# Patient Record
Sex: FEMALE | Race: WHITE | Employment: OTHER | URBAN - METROPOLITAN AREA
[De-identification: names, ages, dates, MRNs, and addresses within clinical notes are randomized per-mention and may not be internally consistent; named-entity substitution may affect disease eponyms.]

---

## 2021-09-24 ENCOUNTER — TRANSCRIBE ORDER (OUTPATIENT)
Dept: REGISTRATION | Age: 71
End: 2021-09-24

## 2021-09-24 ENCOUNTER — HOSPITAL ENCOUNTER (OUTPATIENT)
Dept: LAB | Age: 71
Discharge: HOME | End: 2021-09-24
Payer: MEDICARE

## 2021-09-24 DIAGNOSIS — K75.81 NONALCOHOLIC STEATOHEPATITIS: ICD-10-CM

## 2021-09-24 DIAGNOSIS — K75.81 NONALCOHOLIC STEATOHEPATITIS: Primary | ICD-10-CM

## 2021-09-24 LAB
ALBUMIN SERPL-MCNC: 2.4 G/DL (ref 3.2–4.6)
ALBUMIN/GLOB SERPL: 0.4 {RATIO} (ref 1.2–3.5)
ALP SERPL-CCNC: 202 U/L (ref 50–136)
ALT SERPL-CCNC: 27 U/L (ref 12–65)
ANION GAP SERPL CALC-SCNC: 4 MMOL/L (ref 7–16)
APTT PPP: 30.9 SEC (ref 24.1–35.1)
AST SERPL-CCNC: 34 U/L (ref 15–37)
BASOPHILS # BLD: 0 K/UL (ref 0–0.2)
BASOPHILS NFR BLD: 1 % (ref 0–2)
BILIRUB SERPL-MCNC: 0.7 MG/DL (ref 0.2–1.1)
BUN SERPL-MCNC: 23 MG/DL (ref 8–23)
CALCIUM SERPL-MCNC: 9.4 MG/DL (ref 8.3–10.4)
CHLORIDE SERPL-SCNC: 100 MMOL/L (ref 98–107)
CO2 SERPL-SCNC: 33 MMOL/L (ref 21–32)
CREAT SERPL-MCNC: 0.91 MG/DL (ref 0.6–1)
DIFFERENTIAL METHOD BLD: ABNORMAL
EOSINOPHIL # BLD: 0.1 K/UL (ref 0–0.8)
EOSINOPHIL NFR BLD: 1 % (ref 0.5–7.8)
ERYTHROCYTE [DISTWIDTH] IN BLOOD BY AUTOMATED COUNT: 15.5 % (ref 11.9–14.6)
GLOBULIN SER CALC-MCNC: 6 G/DL (ref 2.3–3.5)
GLUCOSE SERPL-MCNC: 236 MG/DL (ref 65–100)
HCT VFR BLD AUTO: 38.2 % (ref 35.8–46.3)
HGB BLD-MCNC: 12 G/DL (ref 11.7–15.4)
IMM GRANULOCYTES # BLD AUTO: 0.1 K/UL (ref 0–0.5)
IMM GRANULOCYTES NFR BLD AUTO: 2 % (ref 0–5)
INR PPP: 1.2
LYMPHOCYTES # BLD: 0.6 K/UL (ref 0.5–4.6)
LYMPHOCYTES NFR BLD: 14 % (ref 13–44)
MCH RBC QN AUTO: 27.3 PG (ref 26.1–32.9)
MCHC RBC AUTO-ENTMCNC: 31.4 G/DL (ref 31.4–35)
MCV RBC AUTO: 87 FL (ref 79.6–97.8)
MONOCYTES # BLD: 0.7 K/UL (ref 0.1–1.3)
MONOCYTES NFR BLD: 16 % (ref 4–12)
NEUTS SEG # BLD: 3 K/UL (ref 1.7–8.2)
NEUTS SEG NFR BLD: 67 % (ref 43–78)
NRBC # BLD: 0 K/UL (ref 0–0.2)
PLATELET # BLD AUTO: 234 K/UL (ref 150–450)
PMV BLD AUTO: 10.7 FL (ref 9.4–12.3)
POTASSIUM SERPL-SCNC: 3.7 MMOL/L (ref 3.5–5.1)
PROT SERPL-MCNC: 8.4 G/DL (ref 6.3–8.2)
PROTHROMBIN TIME: 15.3 SEC (ref 12.6–14.5)
RBC # BLD AUTO: 4.39 M/UL (ref 4.05–5.2)
SODIUM SERPL-SCNC: 137 MMOL/L (ref 136–145)
WBC # BLD AUTO: 4.5 K/UL (ref 4.3–11.1)

## 2021-09-24 PROCEDURE — 85025 COMPLETE CBC W/AUTO DIFF WBC: CPT

## 2021-09-24 PROCEDURE — 36415 COLL VENOUS BLD VENIPUNCTURE: CPT

## 2021-09-24 PROCEDURE — 85730 THROMBOPLASTIN TIME PARTIAL: CPT

## 2021-09-24 PROCEDURE — 85610 PROTHROMBIN TIME: CPT

## 2021-09-24 PROCEDURE — 80053 COMPREHEN METABOLIC PANEL: CPT

## 2021-10-04 ENCOUNTER — HOSPITAL ENCOUNTER (OUTPATIENT)
Dept: INTERVENTIONAL RADIOLOGY/VASCULAR | Age: 71
Discharge: HOME | End: 2021-10-04
Attending: INTERNAL MEDICINE
Payer: MEDICARE

## 2021-10-04 VITALS
SYSTOLIC BLOOD PRESSURE: 124 MMHG | WEIGHT: 153 LBS | TEMPERATURE: 97.2 F | BODY MASS INDEX: 26.12 KG/M2 | RESPIRATION RATE: 16 BRPM | OXYGEN SATURATION: 98 % | HEART RATE: 69 BPM | DIASTOLIC BLOOD PRESSURE: 62 MMHG | HEIGHT: 64 IN

## 2021-10-04 DIAGNOSIS — K75.81 NASH (NONALCOHOLIC STEATOHEPATITIS): ICD-10-CM

## 2021-10-04 PROCEDURE — 77030010507 HC ADH SKN DERMBND J&J -B

## 2021-10-04 PROCEDURE — 77030014146 HC TY THORCENT/PARACENT BD -B

## 2021-10-04 PROCEDURE — 49083 ABD PARACENTESIS W/IMAGING: CPT

## 2021-10-04 RX ORDER — GUAIFENESIN 100 MG/5ML
81 LIQUID (ML) ORAL DAILY
COMMUNITY

## 2021-10-04 NOTE — DISCHARGE INSTRUCTIONS
[address] [facility] [place]  Department of Interventional Radiology  [facility]  [phone] Office  [phone] Fax    PARACENTESIS DISCHARGE INSTRUCTIONS    General Information:  During this procedure, the doctor will insert a needle into the abdomen to drain fluid. After the procedure, you will be able to take a deep breath much easier. The site of the puncture may ooze the first day. This will decrease and eventually stop. Paracentesis (draining fluid from the abdomen) sometimes makes patients hypotensive (low blood pressure). Your doctor may order for you to receive fluids or albumin (a volume booster) during the procedure through an IV site. Home Care Instructions:  Keep the puncture site clean and dry. No tub baths or swimming until puncture site heals. Showering is acceptable. Resume your normal diet, and resume your normal activity slowly and as you tolerate. If you are short of breath, rest. If shortness of breath does not ease, please call your ordering doctor. Fluid can re-accumulate in the chest and/or in the abdomen. If this should occur, your doctor needs to know as you may need to have the procedure done again. Call If:     You should call your Physician and/or the Radiology Nurse if you notice any signs of infection, like pus draining, or if it is swollen or reddened. Also call if you have a fever, or if you are bleeding from the puncture site more than a small amount on the dressing. Call if the puncture site keeps draining fluid. Some oozing is to be expected, but should slow and then stop. Call if you feel like you have pressure in your abdomen. SEEK IMMEDIATE CARE OR CALL 911 IF YOU SUDDENLY HAVE TROUBLE BREATHING, OR IF YOUR LIPS TURN BLUE, OR IF YOU NOTICE BLOOD IN YOUR SPUTUM. Follow-Up Instructions: Please see your ordering doctor as he/she has requested. To Reach Us:   If you have any questions about your procedure, please call the Interventional Radiology department at [phone]. After business hours (5pm) and weekends, call the answering service at [phone] and ask for the Radiologist on call to be paged. Si tiene Preguntas acerca del procedimiento, por favor llame al departamento de Radiología Intervencional al [phone]. Después de horas de oficina (5 pm) y los fines de [place], llamar al Adamaris Marilu Bravo al [phone] y pregunte por el Radiologo de [facility]. Interventional Radiology General Nurse Discharge    After general anesthesia or intravenous sedation, for 24 hours or while taking prescription Narcotics:  · Limit your activities  · Do not drive and operate hazardous machinery  · Do not make important personal or business decisions  · Do  not drink alcoholic beverages  · If you have not urinated within 8 hours after discharge, please contact your surgeon on call. * Please give a list of your current medications to your Primary Care Provider. * Please update this list whenever your medications are discontinued, doses are     changed, or new medications (including over-the-counter products) are added. * Please carry medication information at all times in case of emergency situations. These are general instructions for a healthy lifestyle:    No smoking/ No tobacco products/ Avoid exposure to second hand smoke  Surgeon General's Warning:  Quitting smoking now greatly reduces serious risk to your health. Obesity, smoking, and sedentary lifestyle greatly increases your risk for illness  A healthy diet, regular physical exercise & weight monitoring are important for maintaining a healthy lifestyle    You may be retaining fluid if you have a history of heart failure or if you experience any of the following symptoms:  Weight gain of 3 pounds or more overnight or 5 pounds in a week, increased swelling in our hands or feet or shortness of breath while lying flat in bed.   Please call your doctor as soon as you notice any of these symptoms; do not wait until your next office visit. Recognize signs and symptoms of STROKE:  F-face looks uneven    A-arms unable to move or move unevenly    S-speech slurred or non-existent    T-time-call 911 as soon as signs and symptoms begin-DO NOT go       Back to bed or wait to see if you get better-TIME IS BRAIN.

## 2021-10-04 NOTE — PROGRESS NOTES
Interventional Radiology Post Paracentesis/Thoracentesis Note    10/4/2021    Procedure(s): Ultrasound guided Therapeutic Paracentesis Performed with 8 [place]ese catheter total volume 1650 ml. Preliminary Findings: medium yellow. Complications: None    Plan:  Observation, check labs if drawn.           Chest X-Ray:  no

## 2021-10-28 ENCOUNTER — HOSPITAL ENCOUNTER (OUTPATIENT)
Dept: INTERVENTIONAL RADIOLOGY/VASCULAR | Age: 71
Discharge: HOME | End: 2021-10-28
Attending: INTERNAL MEDICINE
Payer: MEDICARE

## 2021-10-28 VITALS
TEMPERATURE: 98 F | SYSTOLIC BLOOD PRESSURE: 119 MMHG | OXYGEN SATURATION: 97 % | WEIGHT: 153 LBS | HEART RATE: 62 BPM | BODY MASS INDEX: 26.12 KG/M2 | DIASTOLIC BLOOD PRESSURE: 59 MMHG | RESPIRATION RATE: 16 BRPM | HEIGHT: 64 IN

## 2021-10-28 DIAGNOSIS — I85.10 SECONDARY ESOPHAGEAL VARICES WITHOUT BLEEDING (HCC): ICD-10-CM

## 2021-10-28 PROCEDURE — 49083 ABD PARACENTESIS W/IMAGING: CPT

## 2021-10-28 PROCEDURE — 77030010507 HC ADH SKN DERMBND J&J -B

## 2021-10-28 PROCEDURE — 77030014146 HC TY THORCENT/PARACENT BD -B

## 2021-10-28 NOTE — DISCHARGE INSTRUCTIONS
[address]  Department of Interventional Radiology  [facility]  [phone] Office  [phone] Fax    PARACENTESIS DISCHARGE INSTRUCTIONS    General Information:  During this procedure, the doctor will insert a needle into the abdomen to drain fluid. After the procedure, you will be able to take a deep breath much easier. The site of the puncture may ooze the first day. This will decrease and eventually stop. Paracentesis (draining fluid from the abdomen) sometimes makes patients hypotensive (low blood pressure). Your doctor may order for you to receive fluids or albumin (a volume booster) during the procedure through an IV site. Home Care Instructions:  Keep the puncture site clean and dry. No tub baths or swimming until puncture site heals. Showering is acceptable. Resume your normal diet, and resume your normal activity slowly and as you tolerate. If you are short of breath, rest. If shortness of breath does not ease, please call your ordering doctor. Fluid can re-accumulate in the chest and/or in the abdomen. If this should occur, your doctor needs to know as you may need to have the procedure done again. Call If:     You should call your Physician and/or the Radiology Nurse if you notice any signs of infection, like pus draining, or if it is swollen or reddened. Also call if you have a fever, or if you are bleeding from the puncture site more than a small amount on the dressing. Call if the puncture site keeps draining fluid. Some oozing is to be expected, but should slow and then stop. Call if you feel like you have pressure in your abdomen. SEEK IMMEDIATE CARE OR CALL 911 IF YOU SUDDENLY HAVE TROUBLE BREATHING, OR IF YOUR LIPS TURN BLUE, OR IF YOU NOTICE BLOOD IN YOUR SPUTUM. Follow-Up Instructions: Please see your ordering doctor as he/she has requested. To Reach Us:     If you have any questions about your procedure, please call the Interventional Radiology department at [phone]. After business hours (5pm) and weekends, call the answering service at [phone] and ask for the Radiologist on call to be paged. Si tiene Preguntas acerca del procedimiento, por favor llame al departamento de Radiología Intervencional al [phone]. Después de horas de oficina (5 pm) y los fines de [place], llamar al Andrea Bravo al [phone] y pregunte por el Radiologo de [facility]. Interventional Radiology General Nurse Discharge    After general anesthesia or intravenous sedation, for 24 hours or while taking prescription Narcotics:  · Limit your activities  · Do not drive and operate hazardous machinery  · Do not make important personal or business decisions  · Do  not drink alcoholic beverages  · If you have not urinated within 8 hours after discharge, please contact your surgeon on call. * Please give a list of your current medications to your Primary Care Provider. * Please update this list whenever your medications are discontinued, doses are     changed, or new medications (including over-the-counter products) are added. * Please carry medication information at all times in case of emergency situations. These are general instructions for a healthy lifestyle:    No smoking/ No tobacco products/ Avoid exposure to second hand smoke  Surgeon General's Warning:  Quitting smoking now greatly reduces serious risk to your health. Obesity, smoking, and sedentary lifestyle greatly increases your risk for illness  A healthy diet, regular physical exercise & weight monitoring are important for maintaining a healthy lifestyle    You may be retaining fluid if you have a history of heart failure or if you experience any of the following symptoms:  Weight gain of 3 pounds or more overnight or 5 pounds in a week, increased swelling in our hands or feet or shortness of breath while lying flat in bed.   Please call your doctor as soon as you notice any of these symptoms; do not wait until your next office visit. Recognize signs and symptoms of STROKE:  F-face looks uneven    A-arms unable to move or move unevenly    S-speech slurred or non-existent    T-time-call 911 as soon as signs and symptoms begin-DO NOT go       Back to bed or wait to see if you get better-TIME IS BRAIN.       Date: 10/28/2021  Discharging Nurse: Prashanth Lopez RN

## 2021-11-22 ENCOUNTER — HOSPITAL ENCOUNTER (OUTPATIENT)
Dept: INTERVENTIONAL RADIOLOGY/VASCULAR | Age: 71
Discharge: HOME | End: 2021-11-22
Attending: INTERNAL MEDICINE
Payer: MEDICARE

## 2021-11-22 VITALS
RESPIRATION RATE: 16 BRPM | OXYGEN SATURATION: 97 % | DIASTOLIC BLOOD PRESSURE: 61 MMHG | HEART RATE: 65 BPM | TEMPERATURE: 97.7 F | SYSTOLIC BLOOD PRESSURE: 115 MMHG

## 2021-11-22 DIAGNOSIS — K75.81 NASH (NONALCOHOLIC STEATOHEPATITIS): ICD-10-CM

## 2021-11-22 PROCEDURE — 77030010507 HC ADH SKN DERMBND J&J -B

## 2021-11-22 PROCEDURE — 77030014146 HC TY THORCENT/PARACENT BD -B

## 2021-11-22 PROCEDURE — 49083 ABD PARACENTESIS W/IMAGING: CPT

## 2021-11-22 NOTE — DISCHARGE INSTRUCTIONS
[address]  Department of Interventional Radiology  [facility]  [phone] Office  [phone] Fax    PARACENTESIS DISCHARGE INSTRUCTIONS    General Information:  During this procedure, the doctor will insert a needle into the abdomen to drain fluid. After the procedure, you will be able to take a deep breath much easier. The site of the puncture may ooze the first day. This will decrease and eventually stop. Paracentesis (draining fluid from the abdomen) sometimes makes patients hypotensive (low blood pressure). Your doctor may order for you to receive fluids or albumin (a volume booster) during the procedure through an IV site. Home Care Instructions:  Keep the puncture site clean and dry. No tub baths or swimming until puncture site heals. Showering is acceptable. Resume your normal diet, and resume your normal activity slowly and as you tolerate. If you are short of breath, rest. If shortness of breath does not ease, please call your ordering doctor. Fluid can re-accumulate in the chest and/or in the abdomen. If this should occur, your doctor needs to know as you may need to have the procedure done again. Call If:     You should call your Physician and/or the Radiology Nurse if you notice any signs of infection, like pus draining, or if it is swollen or reddened. Also call if you have a fever, or if you are bleeding from the puncture site more than a small amount on the dressing. Call if the puncture site keeps draining fluid. Some oozing is to be expected, but should slow and then stop. Call if you feel like you have pressure in your abdomen. SEEK IMMEDIATE CARE OR CALL 911 IF YOU SUDDENLY HAVE TROUBLE BREATHING, OR IF YOUR LIPS TURN BLUE, OR IF YOU NOTICE BLOOD IN YOUR SPUTUM. Follow-Up Instructions: Please see your ordering doctor as he/she has requested. To Reach Us:     If you have any questions about your procedure, please call the Interventional Radiology department at [phone]. After business hours (5pm) and weekends, call the answering service at [phone] and ask for the Radiologist on call to be paged. Si tiene Preguntas acerca del procedimiento, por favor llame al departamento de Radiología Intervencional al [phone]. Después de horas de oficina (5 pm) y los fines de [place], llamar al Adamaris Marilu Zayaslotte al [phone] y pregunte por el Radiologo de [facility]. Interventional Radiology General Nurse Discharge    After general anesthesia or intravenous sedation, for 24 hours or while taking prescription Narcotics:  · Limit your activities  · Do not drive and operate hazardous machinery  · Do not make important personal or business decisions  · Do  not drink alcoholic beverages  · If you have not urinated within 8 hours after discharge, please contact your surgeon on call. * Please give a list of your current medications to your Primary Care Provider. * Please update this list whenever your medications are discontinued, doses are     changed, or new medications (including over-the-counter products) are added. * Please carry medication information at all times in case of emergency situations. These are general instructions for a healthy lifestyle:    No smoking/ No tobacco products/ Avoid exposure to second hand smoke  Surgeon General's Warning:  Quitting smoking now greatly reduces serious risk to your health. Obesity, smoking, and sedentary lifestyle greatly increases your risk for illness  A healthy diet, regular physical exercise & weight monitoring are important for maintaining a healthy lifestyle    You may be retaining fluid if you have a history of heart failure or if you experience any of the following symptoms:  Weight gain of 3 pounds or more overnight or 5 pounds in a week, increased swelling in our hands or feet or shortness of breath while lying flat in bed.   Please call your doctor as soon as you notice any of these symptoms; do not wait until your next office visit. Recognize signs and symptoms of STROKE:  F-face looks uneven    A-arms unable to move or move unevenly    S-speech slurred or non-existent    T-time-call 911 as soon as signs and symptoms begin-DO NOT go       Back to bed or wait to see if you get better-TIME IS BRAIN.       Date: 11/22/2021  Discharging Nurse: [place] RN

## 2021-12-08 PROBLEM — G43.701 CHRONIC MIGRAINE WITHOUT AURA WITH STATUS MIGRAINOSUS, NOT INTRACTABLE: Status: ACTIVE | Noted: 2017-06-22

## 2021-12-08 PROBLEM — K52.9 CHRONIC DIARRHEA: Status: ACTIVE | Noted: 2019-03-12

## 2021-12-08 PROBLEM — E87.6 HYPOKALEMIA: Status: ACTIVE | Noted: 2020-09-29

## 2021-12-08 PROBLEM — G89.29 CHRONIC ABDOMINAL PAIN: Status: ACTIVE | Noted: 2019-03-12

## 2021-12-08 PROBLEM — K75.81 NASH (NONALCOHOLIC STEATOHEPATITIS): Status: ACTIVE | Noted: 2021-04-26

## 2021-12-08 PROBLEM — M21.372 LEFT FOOT DROP: Status: ACTIVE | Noted: 2021-04-26

## 2021-12-08 PROBLEM — I25.10 ATHEROSCLEROSIS OF CORONARY ARTERY: Status: ACTIVE | Noted: 2020-03-07

## 2021-12-08 PROBLEM — R11.2 NAUSEA & VOMITING: Status: ACTIVE | Noted: 2021-04-26

## 2021-12-08 PROBLEM — E78.5 DYSLIPIDEMIA: Status: ACTIVE | Noted: 2020-06-30

## 2021-12-08 PROBLEM — N39.0 UTI (URINARY TRACT INFECTION): Status: ACTIVE | Noted: 2020-09-29

## 2021-12-08 PROBLEM — I85.00 ESOPHAGEAL VARICES DETERMINED BY ENDOSCOPY (HCC): Status: ACTIVE | Noted: 2021-12-08

## 2021-12-08 PROBLEM — R10.9 CHRONIC ABDOMINAL PAIN: Status: ACTIVE | Noted: 2019-03-12

## 2021-12-08 PROBLEM — M47.812 DJD (DEGENERATIVE JOINT DISEASE) OF CERVICAL SPINE: Status: ACTIVE | Noted: 2021-12-08

## 2021-12-08 PROBLEM — H53.9 VISUAL DISTURBANCE: Status: ACTIVE | Noted: 2021-04-25

## 2021-12-08 PROBLEM — Z95.5 PRESENCE OF DRUG-ELUTING STENT IN LEFT CIRCUMFLEX CORONARY ARTERY: Status: ACTIVE | Noted: 2020-01-31

## 2021-12-08 PROBLEM — K44.9 HIATAL HERNIA: Status: ACTIVE | Noted: 2021-12-08

## 2021-12-08 PROBLEM — R07.9 CHEST PAIN: Status: ACTIVE | Noted: 2020-01-31

## 2021-12-08 PROBLEM — D50.0 ANEMIA DUE TO BLOOD LOSS: Status: ACTIVE | Noted: 2021-12-08

## 2021-12-08 PROBLEM — E87.1 HYPONATREMIA: Status: ACTIVE | Noted: 2021-04-26

## 2021-12-08 PROBLEM — N39.3 URINE, INCONTINENCE, STRESS FEMALE: Status: ACTIVE | Noted: 2018-07-31

## 2021-12-10 ENCOUNTER — HOSPITAL ENCOUNTER (OUTPATIENT)
Dept: INTERVENTIONAL RADIOLOGY/VASCULAR | Age: 71
Discharge: HOME | End: 2021-12-10
Attending: INTERNAL MEDICINE
Payer: MEDICARE

## 2021-12-10 VITALS
OXYGEN SATURATION: 97 % | HEART RATE: 71 BPM | RESPIRATION RATE: 16 BRPM | TEMPERATURE: 98.1 F | DIASTOLIC BLOOD PRESSURE: 63 MMHG | SYSTOLIC BLOOD PRESSURE: 143 MMHG

## 2021-12-10 DIAGNOSIS — K75.81 NASH (NONALCOHOLIC STEATOHEPATITIS): ICD-10-CM

## 2021-12-10 PROCEDURE — 76705 ECHO EXAM OF ABDOMEN: CPT

## 2021-12-10 NOTE — DISCHARGE INSTRUCTIONS
[address]  Department of Interventional Radiology  [facility]  [phone] Office  [phone] Fax    PARACENTESIS DISCHARGE INSTRUCTIONS    General Information:  During this procedure, the doctor will insert a needle into the abdomen to drain fluid. After the procedure, you will be able to take a deep breath much easier. The site of the puncture may ooze the first day. This will decrease and eventually stop. Paracentesis (draining fluid from the abdomen) sometimes makes patients hypotensive (low blood pressure). Your doctor may order for you to receive fluids or albumin (a volume booster) during the procedure through an IV site. Home Care Instructions:  Keep the puncture site clean and dry. No tub baths or swimming until puncture site heals. Showering is acceptable. Resume your normal diet, and resume your normal activity slowly and as you tolerate. If you are short of breath, rest. If shortness of breath does not ease, please call your ordering doctor. Fluid can re-accumulate in the chest and/or in the abdomen. If this should occur, your doctor needs to know as you may need to have the procedure done again. Call If:     You should call your Physician and/or the Radiology Nurse if you notice any signs of infection, like pus draining, or if it is swollen or reddened. Also call if you have a fever, or if you are bleeding from the puncture site more than a small amount on the dressing. Call if the puncture site keeps draining fluid. Some oozing is to be expected, but should slow and then stop. Call if you feel like you have pressure in your abdomen. SEEK IMMEDIATE CARE OR CALL 911 IF YOU SUDDENLY HAVE TROUBLE BREATHING, OR IF YOUR LIPS TURN BLUE, OR IF YOU NOTICE BLOOD IN YOUR SPUTUM. If you have any questions about your procedure, please call the Interventional Radiology department at [phone].  After business hours (5pm) and weekends, call the answering service at [phone] and ask for the Radiologist on call to be paged. Si tiene Preguntas acerca del procedimiento, por favor llame al departamento de Radiología Intervencional al [phone]. Después de horas de oficina (5 pm) y los fines de [place], llamar al Ceci Dadamaksim Shelly al [phone] y pregunte por el Radiologo de [facility]. Follow-Up Instructions: Please see your ordering doctor as he/she has requested. To Reach Us: Interventional Radiology General Nurse Discharge    After general anesthesia or intravenous sedation, for 24 hours or while taking prescription Narcotics:  · Limit your activities  · Do not drive and operate hazardous machinery  · Do not make important personal or business decisions  · Do  not drink alcoholic beverages  · If you have not urinated within 8 hours after discharge, please contact your surgeon on call. * Please give a list of your current medications to your Primary Care Provider. * Please update this list whenever your medications are discontinued, doses are     changed, or new medications (including over-the-counter products) are added. * Please carry medication information at all times in case of emergency situations. These are general instructions for a healthy lifestyle:    No smoking/ No tobacco products/ Avoid exposure to second hand smoke  Surgeon General's Warning:  Quitting smoking now greatly reduces serious risk to your health. Obesity, smoking, and sedentary lifestyle greatly increases your risk for illness  A healthy diet, regular physical exercise & weight monitoring are important for maintaining a healthy lifestyle    You may be retaining fluid if you have a history of heart failure or if you experience any of the following symptoms:  Weight gain of 3 pounds or more overnight or 5 pounds in a week, increased swelling in our hands or feet or shortness of breath while lying flat in bed.   Please call your doctor as soon as you notice any of these symptoms; do not wait until your next office visit. Recognize signs and symptoms of STROKE:  F-face looks uneven    A-arms unable to move or move unevenly    S-speech slurred or non-existent    T-time-call 911 as soon as signs and symptoms begin-DO NOT go       Back to bed or wait to see if you get better-TIME IS BRAIN.

## 2021-12-10 NOTE — PROCEDURES
Department of Interventional Radiology  [phone]        Interventional Radiology Brief Procedure Note    Patient: Macey Carolina MRN: [id number]  SSN: [id number]    YOB: 1950  Age: 66 y.o. Sex: female      Date of Procedure: 12/10/2021    Pre-Procedure Diagnosis: CARABALLO. Cirrhosis. Ascites. Post-Procedure Diagnosis: SAME    Procedure(s): Abdominal US examination. Brief Description of Procedure: as above    Performed By: Daniel Parisi MD     Assistants: None    Anesthesia:None    Estimated Blood Loss: None    Specimens:  None    Implants:  None    Findings: Minimal ascites. Complications: None    Recommendations: Return in 1-2 weeks. Follow Up: as above.      Signed By: Daniel Parisi MD     December 10, 2021

## 2022-01-06 ENCOUNTER — HOME HEALTH ADMISSION (OUTPATIENT)
Dept: HOME HEALTH SERVICES | Facility: HOME HEALTH | Age: 72
End: 2022-01-06
Payer: MEDICARE

## 2022-01-20 ENCOUNTER — HOME CARE VISIT (OUTPATIENT)
Dept: SCHEDULING | Facility: HOME HEALTH | Age: 72
End: 2022-01-20
Payer: MEDICARE

## 2022-01-20 VITALS
BODY MASS INDEX: 26.26 KG/M2 | WEIGHT: 153 LBS | RESPIRATION RATE: 18 BRPM | OXYGEN SATURATION: 98 % | DIASTOLIC BLOOD PRESSURE: 76 MMHG | HEART RATE: 82 BPM | SYSTOLIC BLOOD PRESSURE: 138 MMHG | TEMPERATURE: 97.9 F

## 2022-01-20 PROCEDURE — G0299 HHS/HOSPICE OF RN EA 15 MIN: HCPCS

## 2022-01-20 PROCEDURE — 3331090002 HH PPS REVENUE DEBIT

## 2022-01-20 PROCEDURE — 400013 HH SOC

## 2022-01-20 PROCEDURE — 400018 HH-NO PAY CLAIM PROCEDURE

## 2022-01-20 PROCEDURE — 3331090001 HH PPS REVENUE CREDIT

## 2022-01-21 PROCEDURE — 3331090002 HH PPS REVENUE DEBIT

## 2022-01-21 PROCEDURE — 3331090001 HH PPS REVENUE CREDIT

## 2022-01-22 PROCEDURE — 3331090002 HH PPS REVENUE DEBIT

## 2022-01-22 PROCEDURE — 3331090001 HH PPS REVENUE CREDIT

## 2022-01-23 PROCEDURE — 3331090002 HH PPS REVENUE DEBIT

## 2022-01-23 PROCEDURE — 3331090001 HH PPS REVENUE CREDIT

## 2022-01-24 ENCOUNTER — HOME CARE VISIT (OUTPATIENT)
Dept: SCHEDULING | Facility: HOME HEALTH | Age: 72
End: 2022-01-24
Payer: MEDICARE

## 2022-01-24 VITALS
SYSTOLIC BLOOD PRESSURE: 120 MMHG | DIASTOLIC BLOOD PRESSURE: 68 MMHG | TEMPERATURE: 98.5 F | RESPIRATION RATE: 17 BRPM | HEART RATE: 77 BPM | OXYGEN SATURATION: 99 %

## 2022-01-24 PROCEDURE — G0299 HHS/HOSPICE OF RN EA 15 MIN: HCPCS

## 2022-01-24 PROCEDURE — 3331090001 HH PPS REVENUE CREDIT

## 2022-01-24 PROCEDURE — 3331090002 HH PPS REVENUE DEBIT

## 2022-01-25 PROCEDURE — 3331090002 HH PPS REVENUE DEBIT

## 2022-01-25 PROCEDURE — 3331090001 HH PPS REVENUE CREDIT

## 2022-01-26 PROCEDURE — 3331090002 HH PPS REVENUE DEBIT

## 2022-01-26 PROCEDURE — 3331090001 HH PPS REVENUE CREDIT

## 2022-01-27 PROCEDURE — 3331090002 HH PPS REVENUE DEBIT

## 2022-01-27 PROCEDURE — 3331090001 HH PPS REVENUE CREDIT

## 2022-01-28 ENCOUNTER — HOME CARE VISIT (OUTPATIENT)
Dept: SCHEDULING | Facility: HOME HEALTH | Age: 72
End: 2022-01-28
Payer: MEDICARE

## 2022-01-28 VITALS
OXYGEN SATURATION: 99 % | HEART RATE: 76 BPM | SYSTOLIC BLOOD PRESSURE: 116 MMHG | TEMPERATURE: 97.2 F | DIASTOLIC BLOOD PRESSURE: 70 MMHG | RESPIRATION RATE: 18 BRPM

## 2022-01-28 PROCEDURE — 3331090002 HH PPS REVENUE DEBIT

## 2022-01-28 PROCEDURE — 3331090001 HH PPS REVENUE CREDIT

## 2022-01-28 PROCEDURE — G0299 HHS/HOSPICE OF RN EA 15 MIN: HCPCS

## 2022-01-29 PROCEDURE — 3331090002 HH PPS REVENUE DEBIT

## 2022-01-29 PROCEDURE — 3331090001 HH PPS REVENUE CREDIT

## 2022-01-30 PROCEDURE — 3331090002 HH PPS REVENUE DEBIT

## 2022-01-30 PROCEDURE — 3331090001 HH PPS REVENUE CREDIT

## 2022-01-31 ENCOUNTER — HOSPITAL ENCOUNTER (OUTPATIENT)
Dept: GENERAL RADIOLOGY | Age: 72
Discharge: HOME | End: 2022-01-31
Attending: INTERNAL MEDICINE
Payer: MEDICARE

## 2022-01-31 ENCOUNTER — HOSPITAL ENCOUNTER (OUTPATIENT)
Dept: INTERVENTIONAL RADIOLOGY/VASCULAR | Age: 72
Discharge: HOME | End: 2022-01-31
Attending: INTERNAL MEDICINE
Payer: MEDICARE

## 2022-01-31 VITALS
BODY MASS INDEX: 28.85 KG/M2 | HEIGHT: 64 IN | RESPIRATION RATE: 18 BRPM | TEMPERATURE: 97.4 F | OXYGEN SATURATION: 98 % | HEART RATE: 68 BPM | SYSTOLIC BLOOD PRESSURE: 121 MMHG | DIASTOLIC BLOOD PRESSURE: 62 MMHG | WEIGHT: 169 LBS

## 2022-01-31 DIAGNOSIS — R91.8 LUNG INFILTRATE: ICD-10-CM

## 2022-01-31 DIAGNOSIS — K75.81 NASH (NONALCOHOLIC STEATOHEPATITIS): ICD-10-CM

## 2022-01-31 PROCEDURE — 49083 ABD PARACENTESIS W/IMAGING: CPT

## 2022-01-31 PROCEDURE — 77030014146 HC TY THORCENT/PARACENT BD -B

## 2022-01-31 PROCEDURE — 77030010507 HC ADH SKN DERMBND J&J -B

## 2022-01-31 PROCEDURE — 3331090001 HH PPS REVENUE CREDIT

## 2022-01-31 PROCEDURE — 71046 X-RAY EXAM CHEST 2 VIEWS: CPT

## 2022-01-31 PROCEDURE — 3331090002 HH PPS REVENUE DEBIT

## 2022-01-31 NOTE — PROGRESS NOTES
Interventional Radiology Post Paracentesis/Thoracentesis Note    1/31/2022    Procedure(s): Ultrasound guided Therapeutic Paracentesis Performed with 8 [place] catheter total volume 3250 ml. Preliminary Findings: medium clear and yellow. Complications: None    Plan:  Observation, check labs if drawn.           Chest X-Ray:  no    Full dictated report to follow

## 2022-01-31 NOTE — DISCHARGE INSTRUCTIONS
[address]  Department of Interventional Radiology  [facility]  [phone] Office  [phone] Fax    PARACENTESIS DISCHARGE INSTRUCTIONS    General Information:  During this procedure, the doctor will insert a needle into the abdomen to drain fluid. After the procedure, you will be able to take a deep breath much easier. The site of the puncture may ooze the first day. This will decrease and eventually stop. Paracentesis (draining fluid from the abdomen) sometimes makes patients hypotensive (low blood pressure). Your doctor may order for you to receive fluids or albumin (a volume booster) during the procedure through an IV site. Home Care Instructions:  Keep the puncture site clean and dry. No tub baths or swimming until puncture site heals. Showering is acceptable. Resume your normal diet, and resume your normal activity slowly and as you tolerate. If you are short of breath, rest. If shortness of breath does not ease, please call your ordering doctor. Fluid can re-accumulate in the chest and/or in the abdomen. If this should occur, your doctor needs to know as you may need to have the procedure done again. Call If:     You should call your Physician and/or the Radiology Nurse if you notice any signs of infection, like pus draining, or if it is swollen or reddened. Also call if you have a fever, or if you are bleeding from the puncture site more than a small amount on the dressing. Call if the puncture site keeps draining fluid. Some oozing is to be expected, but should slow and then stop. Call if you feel like you have pressure in your abdomen. SEEK IMMEDIATE CARE OR CALL 911 IF YOU SUDDENLY HAVE TROUBLE BREATHING, OR IF YOUR LIPS TURN BLUE, OR IF YOU NOTICE BLOOD IN YOUR SPUTUM. Follow-Up Instructions: Please see your ordering doctor as he/she has requested. To Reach Us:     If you have any questions about your procedure, please call the Interventional Radiology department at [phone]. After business hours (5pm) and weekends, call the answering service at [phone] and ask for the Radiologist on call to be paged. Si tiene Preguntas acerca del procedimiento, por favor llame al departamento de Radiología Intervencional al [phone]. Después de horas de oficina (5 pm) y los fines de [place], llamar al Sailaja Shyann Bravo al [phone] y pregunte por el Radiologo de [place] [facility]. Interventional Radiology General Nurse Discharge    After general anesthesia or intravenous sedation, for 24 hours or while taking prescription Narcotics:  · Limit your activities  · Do not drive and operate hazardous machinery  · Do not make important personal or business decisions  · Do  not drink alcoholic beverages  · If you have not urinated within 8 hours after discharge, please contact your surgeon on call. * Please give a list of your current medications to your Primary Care Provider. * Please update this list whenever your medications are discontinued, doses are     changed, or new medications (including over-the-counter products) are added. * Please carry medication information at all times in case of emergency situations. These are general instructions for a healthy lifestyle:    No smoking/ No tobacco products/ Avoid exposure to second hand smoke  Surgeon General's Warning:  Quitting smoking now greatly reduces serious risk to your health. Obesity, smoking, and sedentary lifestyle greatly increases your risk for illness  A healthy diet, regular physical exercise & weight monitoring are important for maintaining a healthy lifestyle    You may be retaining fluid if you have a history of heart failure or if you experience any of the following symptoms:  Weight gain of 3 pounds or more overnight or 5 pounds in a week, increased swelling in our hands or feet or shortness of breath while lying flat in bed.   Please call your doctor as soon as you notice any of these symptoms; do not wait until your next office visit. Recognize signs and symptoms of STROKE:  F-face looks uneven    A-arms unable to move or move unevenly    S-speech slurred or non-existent    T-time-call 911 as soon as signs and symptoms begin-DO NOT go       Back to bed or wait to see if you get better-TIME IS BRAIN.       Date: 1/31/2022  Discharging Nurse: Nadia Weir RN

## 2022-01-31 NOTE — PROGRESS NOTES
All discharge instructions gone over with pt at bedside. All questions answered. Paper copy signed and placed with patient's physical chart.

## 2022-02-01 PROCEDURE — 3331090001 HH PPS REVENUE CREDIT

## 2022-02-01 PROCEDURE — 3331090002 HH PPS REVENUE DEBIT

## 2022-02-02 ENCOUNTER — HOME CARE VISIT (OUTPATIENT)
Dept: SCHEDULING | Facility: HOME HEALTH | Age: 72
End: 2022-02-02
Payer: MEDICARE

## 2022-02-02 VITALS
HEART RATE: 75 BPM | DIASTOLIC BLOOD PRESSURE: 64 MMHG | RESPIRATION RATE: 16 BRPM | SYSTOLIC BLOOD PRESSURE: 108 MMHG | OXYGEN SATURATION: 96 % | TEMPERATURE: 98.5 F

## 2022-02-02 PROCEDURE — 3331090001 HH PPS REVENUE CREDIT

## 2022-02-02 PROCEDURE — 3331090002 HH PPS REVENUE DEBIT

## 2022-02-02 PROCEDURE — G0299 HHS/HOSPICE OF RN EA 15 MIN: HCPCS

## 2022-02-03 PROCEDURE — 3331090001 HH PPS REVENUE CREDIT

## 2022-02-03 PROCEDURE — 3331090002 HH PPS REVENUE DEBIT

## 2022-02-04 ENCOUNTER — HOME CARE VISIT (OUTPATIENT)
Dept: SCHEDULING | Facility: HOME HEALTH | Age: 72
End: 2022-02-04
Payer: MEDICARE

## 2022-02-04 VITALS
HEART RATE: 74 BPM | TEMPERATURE: 98.2 F | OXYGEN SATURATION: 97 % | DIASTOLIC BLOOD PRESSURE: 64 MMHG | RESPIRATION RATE: 15 BRPM | SYSTOLIC BLOOD PRESSURE: 120 MMHG

## 2022-02-04 PROCEDURE — G0299 HHS/HOSPICE OF RN EA 15 MIN: HCPCS

## 2022-02-04 PROCEDURE — 3331090001 HH PPS REVENUE CREDIT

## 2022-02-04 PROCEDURE — 3331090002 HH PPS REVENUE DEBIT

## 2022-02-05 PROCEDURE — 3331090001 HH PPS REVENUE CREDIT

## 2022-02-05 PROCEDURE — 3331090002 HH PPS REVENUE DEBIT

## 2022-02-06 PROCEDURE — 3331090002 HH PPS REVENUE DEBIT

## 2022-02-06 PROCEDURE — 3331090001 HH PPS REVENUE CREDIT

## 2022-02-07 PROCEDURE — 3331090002 HH PPS REVENUE DEBIT

## 2022-02-07 PROCEDURE — 3331090001 HH PPS REVENUE CREDIT

## 2022-02-08 PROCEDURE — 3331090001 HH PPS REVENUE CREDIT

## 2022-02-08 PROCEDURE — 3331090002 HH PPS REVENUE DEBIT

## 2022-02-09 ENCOUNTER — HOSPITAL ENCOUNTER (OUTPATIENT)
Dept: INTERVENTIONAL RADIOLOGY/VASCULAR | Age: 72
Discharge: HOME | End: 2022-02-09
Attending: INTERNAL MEDICINE

## 2022-02-09 ENCOUNTER — HOME CARE VISIT (OUTPATIENT)
Dept: SCHEDULING | Facility: HOME HEALTH | Age: 72
End: 2022-02-09
Payer: MEDICARE

## 2022-02-09 PROCEDURE — 3331090001 HH PPS REVENUE CREDIT

## 2022-02-09 PROCEDURE — G0299 HHS/HOSPICE OF RN EA 15 MIN: HCPCS

## 2022-02-09 PROCEDURE — 3331090002 HH PPS REVENUE DEBIT

## 2022-02-10 VITALS
RESPIRATION RATE: 17 BRPM | OXYGEN SATURATION: 97 % | HEART RATE: 81 BPM | SYSTOLIC BLOOD PRESSURE: 118 MMHG | TEMPERATURE: 97.9 F | DIASTOLIC BLOOD PRESSURE: 64 MMHG

## 2022-02-10 PROCEDURE — 3331090002 HH PPS REVENUE DEBIT

## 2022-02-10 PROCEDURE — 3331090001 HH PPS REVENUE CREDIT

## 2022-02-11 PROCEDURE — 3331090001 HH PPS REVENUE CREDIT

## 2022-02-11 PROCEDURE — 3331090002 HH PPS REVENUE DEBIT

## 2022-02-12 PROCEDURE — 3331090001 HH PPS REVENUE CREDIT

## 2022-02-12 PROCEDURE — 3331090002 HH PPS REVENUE DEBIT

## 2022-02-13 PROCEDURE — 3331090002 HH PPS REVENUE DEBIT

## 2022-02-13 PROCEDURE — 3331090001 HH PPS REVENUE CREDIT

## 2022-02-14 PROCEDURE — 3331090001 HH PPS REVENUE CREDIT

## 2022-02-14 PROCEDURE — 3331090002 HH PPS REVENUE DEBIT

## 2022-02-15 PROCEDURE — 3331090002 HH PPS REVENUE DEBIT

## 2022-02-15 PROCEDURE — 3331090001 HH PPS REVENUE CREDIT

## 2022-02-16 ENCOUNTER — HOME CARE VISIT (OUTPATIENT)
Dept: SCHEDULING | Facility: HOME HEALTH | Age: 72
End: 2022-02-16
Payer: MEDICARE

## 2022-02-16 VITALS
HEART RATE: 68 BPM | RESPIRATION RATE: 16 BRPM | TEMPERATURE: 98 F | OXYGEN SATURATION: 96 % | SYSTOLIC BLOOD PRESSURE: 120 MMHG | DIASTOLIC BLOOD PRESSURE: 88 MMHG

## 2022-02-16 PROCEDURE — 3331090002 HH PPS REVENUE DEBIT

## 2022-02-16 PROCEDURE — G0299 HHS/HOSPICE OF RN EA 15 MIN: HCPCS

## 2022-02-16 PROCEDURE — 3331090001 HH PPS REVENUE CREDIT

## 2022-02-17 PROCEDURE — 3331090001 HH PPS REVENUE CREDIT

## 2022-02-17 PROCEDURE — 3331090002 HH PPS REVENUE DEBIT

## 2022-02-18 PROCEDURE — 3331090001 HH PPS REVENUE CREDIT

## 2022-02-18 PROCEDURE — 3331090002 HH PPS REVENUE DEBIT

## 2022-02-19 PROCEDURE — 3331090002 HH PPS REVENUE DEBIT

## 2022-02-19 PROCEDURE — 3331090001 HH PPS REVENUE CREDIT

## 2022-02-20 PROCEDURE — 3331090002 HH PPS REVENUE DEBIT

## 2022-02-20 PROCEDURE — 3331090001 HH PPS REVENUE CREDIT

## 2022-02-21 PROCEDURE — 3331090002 HH PPS REVENUE DEBIT

## 2022-02-21 PROCEDURE — 3331090001 HH PPS REVENUE CREDIT

## 2022-02-22 PROCEDURE — 3331090002 HH PPS REVENUE DEBIT

## 2022-02-22 PROCEDURE — 3331090001 HH PPS REVENUE CREDIT

## 2022-02-23 PROCEDURE — 3331090002 HH PPS REVENUE DEBIT

## 2022-02-23 PROCEDURE — 3331090001 HH PPS REVENUE CREDIT

## 2022-02-24 ENCOUNTER — HOME CARE VISIT (OUTPATIENT)
Dept: SCHEDULING | Facility: HOME HEALTH | Age: 72
End: 2022-02-24
Payer: MEDICARE

## 2022-02-24 VITALS
OXYGEN SATURATION: 99 % | TEMPERATURE: 98.4 F | HEART RATE: 67 BPM | DIASTOLIC BLOOD PRESSURE: 78 MMHG | SYSTOLIC BLOOD PRESSURE: 136 MMHG | RESPIRATION RATE: 18 BRPM

## 2022-02-24 PROCEDURE — 3331090002 HH PPS REVENUE DEBIT

## 2022-02-24 PROCEDURE — 3331090001 HH PPS REVENUE CREDIT

## 2022-02-24 PROCEDURE — 400013 HH SOC

## 2022-02-24 PROCEDURE — G0299 HHS/HOSPICE OF RN EA 15 MIN: HCPCS

## 2022-02-25 PROCEDURE — 3331090002 HH PPS REVENUE DEBIT

## 2022-02-25 PROCEDURE — 3331090001 HH PPS REVENUE CREDIT

## 2022-02-26 PROCEDURE — 3331090001 HH PPS REVENUE CREDIT

## 2022-02-26 PROCEDURE — 3331090002 HH PPS REVENUE DEBIT

## 2022-02-27 PROCEDURE — 3331090001 HH PPS REVENUE CREDIT

## 2022-02-27 PROCEDURE — 3331090002 HH PPS REVENUE DEBIT

## 2022-02-28 PROCEDURE — 3331090001 HH PPS REVENUE CREDIT

## 2022-02-28 PROCEDURE — 3331090002 HH PPS REVENUE DEBIT

## 2022-03-01 PROCEDURE — 3331090002 HH PPS REVENUE DEBIT

## 2022-03-01 PROCEDURE — 3331090001 HH PPS REVENUE CREDIT

## 2022-03-02 ENCOUNTER — HOME CARE VISIT (OUTPATIENT)
Dept: SCHEDULING | Facility: HOME HEALTH | Age: 72
End: 2022-03-02
Payer: MEDICARE

## 2022-03-02 VITALS
TEMPERATURE: 98.4 F | HEART RATE: 68 BPM | SYSTOLIC BLOOD PRESSURE: 116 MMHG | RESPIRATION RATE: 18 BRPM | DIASTOLIC BLOOD PRESSURE: 64 MMHG | OXYGEN SATURATION: 96 %

## 2022-03-02 PROCEDURE — 3331090001 HH PPS REVENUE CREDIT

## 2022-03-02 PROCEDURE — 3331090002 HH PPS REVENUE DEBIT

## 2022-03-02 PROCEDURE — G0299 HHS/HOSPICE OF RN EA 15 MIN: HCPCS

## 2022-03-02 NOTE — Clinical Note
SN visit today. Patient continues to have HI readings on glucose meter. Patient stated that she was nauseated with vomiting on 2/26; looking back at meter, at time of sickness, patients blood glucose readings were 175-196. Patient does seems more confused and depressed this visit. Sister states that she had labs drawn 3/1 for at least an ammonia level. SN will continue to monitor. Patient and sister agreed that patient could benefit from PT/OT. SN will f/u with office in morning.

## 2022-03-03 PROCEDURE — 3331090002 HH PPS REVENUE DEBIT

## 2022-03-03 PROCEDURE — 3331090001 HH PPS REVENUE CREDIT

## 2022-03-04 PROCEDURE — 3331090001 HH PPS REVENUE CREDIT

## 2022-03-04 PROCEDURE — 3331090002 HH PPS REVENUE DEBIT

## 2022-03-05 PROCEDURE — 3331090002 HH PPS REVENUE DEBIT

## 2022-03-05 PROCEDURE — 3331090001 HH PPS REVENUE CREDIT

## 2022-03-06 PROCEDURE — 3331090002 HH PPS REVENUE DEBIT

## 2022-03-06 PROCEDURE — 3331090001 HH PPS REVENUE CREDIT

## 2022-03-07 ENCOUNTER — HOME CARE VISIT (OUTPATIENT)
Dept: SCHEDULING | Facility: HOME HEALTH | Age: 72
End: 2022-03-07
Payer: MEDICARE

## 2022-03-07 PROCEDURE — 3331090002 HH PPS REVENUE DEBIT

## 2022-03-07 PROCEDURE — G0152 HHCP-SERV OF OT,EA 15 MIN: HCPCS

## 2022-03-07 PROCEDURE — 3331090001 HH PPS REVENUE CREDIT

## 2022-03-08 ENCOUNTER — HOME CARE VISIT (OUTPATIENT)
Dept: SCHEDULING | Facility: HOME HEALTH | Age: 72
End: 2022-03-08
Payer: MEDICARE

## 2022-03-08 VITALS
TEMPERATURE: 99.7 F | HEART RATE: 78 BPM | SYSTOLIC BLOOD PRESSURE: 116 MMHG | RESPIRATION RATE: 16 BRPM | DIASTOLIC BLOOD PRESSURE: 72 MMHG | OXYGEN SATURATION: 98 %

## 2022-03-08 VITALS
DIASTOLIC BLOOD PRESSURE: 58 MMHG | RESPIRATION RATE: 16 BRPM | OXYGEN SATURATION: 98 % | SYSTOLIC BLOOD PRESSURE: 110 MMHG | TEMPERATURE: 98.6 F | HEART RATE: 70 BPM

## 2022-03-08 PROCEDURE — 3331090001 HH PPS REVENUE CREDIT

## 2022-03-08 PROCEDURE — 3331090002 HH PPS REVENUE DEBIT

## 2022-03-08 PROCEDURE — G0151 HHCP-SERV OF PT,EA 15 MIN: HCPCS

## 2022-03-09 ENCOUNTER — HOME CARE VISIT (OUTPATIENT)
Dept: SCHEDULING | Facility: HOME HEALTH | Age: 72
End: 2022-03-09
Payer: MEDICARE

## 2022-03-09 PROCEDURE — 3331090002 HH PPS REVENUE DEBIT

## 2022-03-09 PROCEDURE — 3331090001 HH PPS REVENUE CREDIT

## 2022-03-09 PROCEDURE — G0299 HHS/HOSPICE OF RN EA 15 MIN: HCPCS

## 2022-03-10 ENCOUNTER — HOME CARE VISIT (OUTPATIENT)
Dept: HOME HEALTH SERVICES | Facility: HOME HEALTH | Age: 72
End: 2022-03-10
Payer: MEDICARE

## 2022-03-10 ENCOUNTER — HOME CARE VISIT (OUTPATIENT)
Dept: SCHEDULING | Facility: HOME HEALTH | Age: 72
End: 2022-03-10
Payer: MEDICARE

## 2022-03-10 VITALS
RESPIRATION RATE: 17 BRPM | TEMPERATURE: 98.3 F | DIASTOLIC BLOOD PRESSURE: 62 MMHG | OXYGEN SATURATION: 97 % | HEART RATE: 64 BPM | SYSTOLIC BLOOD PRESSURE: 112 MMHG

## 2022-03-10 VITALS
OXYGEN SATURATION: 98 % | SYSTOLIC BLOOD PRESSURE: 108 MMHG | TEMPERATURE: 97.7 F | HEART RATE: 67 BPM | DIASTOLIC BLOOD PRESSURE: 59 MMHG | RESPIRATION RATE: 16 BRPM

## 2022-03-10 PROCEDURE — G0158 HHC OT ASSISTANT EA 15: HCPCS

## 2022-03-10 PROCEDURE — 3331090002 HH PPS REVENUE DEBIT

## 2022-03-10 PROCEDURE — 3331090001 HH PPS REVENUE CREDIT

## 2022-03-11 ENCOUNTER — HOME CARE VISIT (OUTPATIENT)
Dept: SCHEDULING | Facility: HOME HEALTH | Age: 72
End: 2022-03-11
Payer: MEDICARE

## 2022-03-11 PROCEDURE — 3331090001 HH PPS REVENUE CREDIT

## 2022-03-11 PROCEDURE — 3331090002 HH PPS REVENUE DEBIT

## 2022-03-12 PROCEDURE — 3331090002 HH PPS REVENUE DEBIT

## 2022-03-12 PROCEDURE — 3331090001 HH PPS REVENUE CREDIT

## 2022-03-13 PROCEDURE — 3331090001 HH PPS REVENUE CREDIT

## 2022-03-13 PROCEDURE — 3331090002 HH PPS REVENUE DEBIT

## 2022-03-14 PROCEDURE — 3331090001 HH PPS REVENUE CREDIT

## 2022-03-14 PROCEDURE — 3331090002 HH PPS REVENUE DEBIT

## 2022-03-15 ENCOUNTER — HOME CARE VISIT (OUTPATIENT)
Dept: HOME HEALTH SERVICES | Facility: HOME HEALTH | Age: 72
End: 2022-03-15
Payer: MEDICARE

## 2022-03-15 PROCEDURE — 3331090001 HH PPS REVENUE CREDIT

## 2022-03-15 PROCEDURE — 3331090002 HH PPS REVENUE DEBIT

## 2022-03-16 ENCOUNTER — HOSPITAL ENCOUNTER (OUTPATIENT)
Dept: INTERVENTIONAL RADIOLOGY/VASCULAR | Age: 72
Discharge: HOME | End: 2022-03-16
Attending: INTERNAL MEDICINE
Payer: MEDICARE

## 2022-03-16 ENCOUNTER — HOME CARE VISIT (OUTPATIENT)
Dept: HOME HEALTH SERVICES | Facility: HOME HEALTH | Age: 72
End: 2022-03-16
Payer: MEDICARE

## 2022-03-16 VITALS
HEIGHT: 64 IN | TEMPERATURE: 98.1 F | SYSTOLIC BLOOD PRESSURE: 125 MMHG | DIASTOLIC BLOOD PRESSURE: 63 MMHG | OXYGEN SATURATION: 98 % | HEART RATE: 69 BPM | RESPIRATION RATE: 18 BRPM | BODY MASS INDEX: 26.12 KG/M2 | WEIGHT: 153 LBS

## 2022-03-16 DIAGNOSIS — R18.8 ASCITES: ICD-10-CM

## 2022-03-16 PROCEDURE — 77030010507 HC ADH SKN DERMBND J&J -B

## 2022-03-16 PROCEDURE — 74011250636 HC RX REV CODE- 250/636: Performed by: PHYSICIAN ASSISTANT

## 2022-03-16 PROCEDURE — 2709999900 HC NON-CHARGEABLE SUPPLY

## 2022-03-16 PROCEDURE — 3331090001 HH PPS REVENUE CREDIT

## 2022-03-16 PROCEDURE — 49083 ABD PARACENTESIS W/IMAGING: CPT

## 2022-03-16 PROCEDURE — P9047 ALBUMIN (HUMAN), 25%, 50ML: HCPCS | Performed by: PHYSICIAN ASSISTANT

## 2022-03-16 PROCEDURE — 74011000250 HC RX REV CODE- 250: Performed by: PHYSICIAN ASSISTANT

## 2022-03-16 PROCEDURE — 77030014146 HC TY THORCENT/PARACENT BD -B

## 2022-03-16 PROCEDURE — 3331090002 HH PPS REVENUE DEBIT

## 2022-03-16 RX ORDER — ALBUMIN HUMAN 250 G/1000ML
12.5-5 SOLUTION INTRAVENOUS
Status: DISCONTINUED | OUTPATIENT
Start: 2022-03-16 | End: 2022-03-20 | Stop reason: HOSPADM

## 2022-03-16 RX ORDER — LIDOCAINE HYDROCHLORIDE 20 MG/ML
20-300 INJECTION, SOLUTION INFILTRATION; PERINEURAL
Status: DISCONTINUED | OUTPATIENT
Start: 2022-03-16 | End: 2022-03-20 | Stop reason: HOSPADM

## 2022-03-16 RX ADMIN — ALBUMIN (HUMAN) 12.5 G: 0.25 INJECTION, SOLUTION INTRAVENOUS at 14:33

## 2022-03-16 RX ADMIN — LIDOCAINE HYDROCHLORIDE 160 MG: 20 INJECTION, SOLUTION INFILTRATION; PERINEURAL at 14:12

## 2022-03-16 NOTE — PROGRESS NOTES
Pt and sister understood d/c instructions. All questions answered. Pt from department via wheelchair with B-Obvious.

## 2022-03-16 NOTE — PROGRESS NOTES
Interventional Radiology Post Paracentesis/Thoracentesis Note    3/16/2022    Procedure(s): Ultrasound guided Therapeutic Paracentesis Performed with 8 Ugandan catheter total volume 5300 ml. Preliminary Findings: large clear and yellow. Complications: None    Plan:  Observation, check labs if drawn.           Chest X-Ray:  no    Full dictated report to follow

## 2022-03-16 NOTE — DISCHARGE INSTRUCTIONS
[address]  Department of Interventional Radiology  [facility]  [phone] Office  [phone] Fax    PARACENTESIS DISCHARGE INSTRUCTIONS    General Information:  During this procedure, the doctor will insert a needle into the abdomen to drain fluid. After the procedure, you will be able to take a deep breath much easier. The site of the puncture may ooze the first day. This will decrease and eventually stop. Paracentesis (draining fluid from the abdomen) sometimes makes patients hypotensive (low blood pressure). Your doctor may order for you to receive fluids or albumin (a volume booster) during the procedure through an IV site. Home Care Instructions:  Keep the puncture site clean and dry. No tub baths or swimming until puncture site heals. Showering is acceptable. Resume your normal diet, and resume your normal activity slowly and as you tolerate. If you are short of breath, rest. If shortness of breath does not ease, please call your ordering doctor. Fluid can re-accumulate in the chest and/or in the abdomen. If this should occur, your doctor needs to know as you may need to have the procedure done again. Call If:     You should call your Physician and/or the Radiology Nurse if you notice any signs of infection, like pus draining, or if it is swollen or reddened. Also call if you have a fever, or if you are bleeding from the puncture site more than a small amount on the dressing. Call if the puncture site keeps draining fluid. Some oozing is to be expected, but should slow and then stop. Call if you feel like you have pressure in your abdomen. SEEK IMMEDIATE CARE OR CALL 911 IF YOU SUDDENLY HAVE TROUBLE BREATHING, OR IF YOUR LIPS TURN BLUE, OR IF YOU NOTICE BLOOD IN YOUR SPUTUM. Follow-Up Instructions: Please see your ordering doctor as he/she has requested. To Reach Us:     If you have any questions about your procedure, please call the Interventional Radiology department at [phone]. After business hours (5pm) and weekends, call the answering service at [phone] and ask for the Radiologist on call to be paged. Si tiene Preguntas acerca del procedimiento, por favor llame al departamento de Radiología Intervencional al [phone]. Después de horas de oficina (5 pm) y los fines de [place], llamar al Rochelle Bravo al [phone] y pregunte por el Radiologo de [facility]. Interventional Radiology General Nurse Discharge    After general anesthesia or intravenous sedation, for 24 hours or while taking prescription Narcotics:  · Limit your activities  · Do not drive and operate hazardous machinery  · Do not make important personal or business decisions  · Do  not drink alcoholic beverages  · If you have not urinated within 8 hours after discharge, please contact your surgeon on call. * Please give a list of your current medications to your Primary Care Provider. * Please update this list whenever your medications are discontinued, doses are     changed, or new medications (including over-the-counter products) are added. * Please carry medication information at all times in case of emergency situations. These are general instructions for a healthy lifestyle:    No smoking/ No tobacco products/ Avoid exposure to second hand smoke  Surgeon General's Warning:  Quitting smoking now greatly reduces serious risk to your health. Obesity, smoking, and sedentary lifestyle greatly increases your risk for illness  A healthy diet, regular physical exercise & weight monitoring are important for maintaining a healthy lifestyle    You may be retaining fluid if you have a history of heart failure or if you experience any of the following symptoms:  Weight gain of 3 pounds or more overnight or 5 pounds in a week, increased swelling in our hands or feet or shortness of breath while lying flat in bed.   Please call your doctor as soon as you notice any of these symptoms; do not wait until your next office visit. Recognize signs and symptoms of STROKE:  F-face looks uneven    A-arms unable to move or move unevenly    S-speech slurred or non-existent    T-time-call 911 as soon as signs and symptoms begin-DO NOT go       Back to bed or wait to see if you get better-TIME IS BRAIN.       Date: 3/16/2022  Discharging Nurse: Kam Medellin RN

## 2022-03-17 ENCOUNTER — HOME CARE VISIT (OUTPATIENT)
Dept: SCHEDULING | Facility: HOME HEALTH | Age: 72
End: 2022-03-17
Payer: MEDICARE

## 2022-03-17 ENCOUNTER — HOME CARE VISIT (OUTPATIENT)
Dept: HOME HEALTH SERVICES | Facility: HOME HEALTH | Age: 72
End: 2022-03-17
Payer: MEDICARE

## 2022-03-17 VITALS
OXYGEN SATURATION: 96 % | SYSTOLIC BLOOD PRESSURE: 126 MMHG | HEART RATE: 69 BPM | RESPIRATION RATE: 16 BRPM | TEMPERATURE: 99.1 F | DIASTOLIC BLOOD PRESSURE: 78 MMHG

## 2022-03-17 PROCEDURE — 3331090001 HH PPS REVENUE CREDIT

## 2022-03-17 PROCEDURE — 3331090002 HH PPS REVENUE DEBIT

## 2022-03-17 PROCEDURE — G0299 HHS/HOSPICE OF RN EA 15 MIN: HCPCS

## 2022-03-18 ENCOUNTER — HOME CARE VISIT (OUTPATIENT)
Dept: HOME HEALTH SERVICES | Facility: HOME HEALTH | Age: 72
End: 2022-03-18
Payer: MEDICARE

## 2022-03-18 PROBLEM — M21.372 LEFT FOOT DROP: Status: ACTIVE | Noted: 2021-04-26

## 2022-03-18 PROBLEM — K44.9 HIATAL HERNIA: Status: ACTIVE | Noted: 2021-12-08

## 2022-03-18 PROBLEM — E78.5 DYSLIPIDEMIA: Status: ACTIVE | Noted: 2020-06-30

## 2022-03-18 PROBLEM — Z95.5 PRESENCE OF DRUG-ELUTING STENT IN LEFT CIRCUMFLEX CORONARY ARTERY: Status: ACTIVE | Noted: 2020-01-31

## 2022-03-18 PROBLEM — N39.0 UTI (URINARY TRACT INFECTION): Status: ACTIVE | Noted: 2020-09-29

## 2022-03-18 PROBLEM — M47.812 DJD (DEGENERATIVE JOINT DISEASE) OF CERVICAL SPINE: Status: ACTIVE | Noted: 2021-12-08

## 2022-03-18 PROCEDURE — 3331090002 HH PPS REVENUE DEBIT

## 2022-03-18 PROCEDURE — 3331090001 HH PPS REVENUE CREDIT

## 2022-03-19 PROBLEM — H53.9 VISUAL DISTURBANCE: Status: ACTIVE | Noted: 2021-04-25

## 2022-03-19 PROBLEM — K52.9 CHRONIC DIARRHEA: Status: ACTIVE | Noted: 2019-03-12

## 2022-03-19 PROBLEM — I85.00 ESOPHAGEAL VARICES DETERMINED BY ENDOSCOPY (HCC): Status: ACTIVE | Noted: 2021-12-08

## 2022-03-19 PROBLEM — I25.10 ATHEROSCLEROSIS OF CORONARY ARTERY: Status: ACTIVE | Noted: 2020-03-07

## 2022-03-19 PROBLEM — R11.2 NAUSEA & VOMITING: Status: ACTIVE | Noted: 2021-04-26

## 2022-03-19 PROBLEM — R10.9 CHRONIC ABDOMINAL PAIN: Status: ACTIVE | Noted: 2019-03-12

## 2022-03-19 PROBLEM — R07.9 CHEST PAIN: Status: ACTIVE | Noted: 2020-01-31

## 2022-03-19 PROBLEM — G43.701 CHRONIC MIGRAINE WITHOUT AURA WITH STATUS MIGRAINOSUS, NOT INTRACTABLE: Status: ACTIVE | Noted: 2017-06-22

## 2022-03-19 PROBLEM — G89.29 CHRONIC ABDOMINAL PAIN: Status: ACTIVE | Noted: 2019-03-12

## 2022-03-19 PROBLEM — K75.81 NASH (NONALCOHOLIC STEATOHEPATITIS): Status: ACTIVE | Noted: 2021-04-26

## 2022-03-19 PROBLEM — E87.6 HYPOKALEMIA: Status: ACTIVE | Noted: 2020-09-29

## 2022-03-19 PROBLEM — D50.0 ANEMIA DUE TO BLOOD LOSS: Status: ACTIVE | Noted: 2021-12-08

## 2022-03-19 PROBLEM — E87.1 HYPONATREMIA: Status: ACTIVE | Noted: 2021-04-26

## 2022-03-19 PROCEDURE — 3331090002 HH PPS REVENUE DEBIT

## 2022-03-19 PROCEDURE — 3331090001 HH PPS REVENUE CREDIT

## 2022-03-20 PROBLEM — N39.3 URINE, INCONTINENCE, STRESS FEMALE: Status: ACTIVE | Noted: 2018-07-31

## 2022-03-20 PROCEDURE — 3331090001 HH PPS REVENUE CREDIT

## 2022-03-20 PROCEDURE — 3331090002 HH PPS REVENUE DEBIT

## 2022-03-21 ENCOUNTER — HOME CARE VISIT (OUTPATIENT)
Dept: SCHEDULING | Facility: HOME HEALTH | Age: 72
End: 2022-03-21
Payer: MEDICARE

## 2022-03-21 PROCEDURE — 400014 HH F/U

## 2022-03-21 PROCEDURE — 3331090001 HH PPS REVENUE CREDIT

## 2022-03-21 PROCEDURE — G0152 HHCP-SERV OF OT,EA 15 MIN: HCPCS

## 2022-03-21 PROCEDURE — 3331090002 HH PPS REVENUE DEBIT

## 2022-03-22 VITALS
HEART RATE: 68 BPM | OXYGEN SATURATION: 98 % | RESPIRATION RATE: 16 BRPM | DIASTOLIC BLOOD PRESSURE: 74 MMHG | TEMPERATURE: 98.7 F | SYSTOLIC BLOOD PRESSURE: 122 MMHG

## 2022-03-22 PROCEDURE — 3331090001 HH PPS REVENUE CREDIT

## 2022-03-22 PROCEDURE — 3331090002 HH PPS REVENUE DEBIT

## 2022-03-23 PROCEDURE — 3331090001 HH PPS REVENUE CREDIT

## 2022-03-23 PROCEDURE — 3331090002 HH PPS REVENUE DEBIT

## 2022-03-24 PROCEDURE — 3331090002 HH PPS REVENUE DEBIT

## 2022-03-24 PROCEDURE — 3331090001 HH PPS REVENUE CREDIT

## 2022-03-25 ENCOUNTER — HOME CARE VISIT (OUTPATIENT)
Dept: SCHEDULING | Facility: HOME HEALTH | Age: 72
End: 2022-03-25
Payer: MEDICARE

## 2022-03-25 ENCOUNTER — HOSPITAL ENCOUNTER (OUTPATIENT)
Dept: INTERVENTIONAL RADIOLOGY/VASCULAR | Age: 72
Discharge: HOME | End: 2022-03-25
Attending: INTERNAL MEDICINE
Payer: MEDICARE

## 2022-03-25 VITALS
DIASTOLIC BLOOD PRESSURE: 62 MMHG | TEMPERATURE: 98.5 F | RESPIRATION RATE: 16 BRPM | HEART RATE: 73 BPM | SYSTOLIC BLOOD PRESSURE: 124 MMHG | OXYGEN SATURATION: 92 %

## 2022-03-25 VITALS
RESPIRATION RATE: 18 BRPM | DIASTOLIC BLOOD PRESSURE: 59 MMHG | TEMPERATURE: 97.7 F | HEART RATE: 64 BPM | BODY MASS INDEX: 29.88 KG/M2 | SYSTOLIC BLOOD PRESSURE: 126 MMHG | HEIGHT: 64 IN | OXYGEN SATURATION: 97 % | WEIGHT: 175 LBS

## 2022-03-25 DIAGNOSIS — R18.8 OTHER ASCITES: ICD-10-CM

## 2022-03-25 DIAGNOSIS — K75.81 NASH (NONALCOHOLIC STEATOHEPATITIS): ICD-10-CM

## 2022-03-25 PROCEDURE — 49083 ABD PARACENTESIS W/IMAGING: CPT

## 2022-03-25 PROCEDURE — 3331090002 HH PPS REVENUE DEBIT

## 2022-03-25 PROCEDURE — 74011000250 HC RX REV CODE- 250: Performed by: PHYSICIAN ASSISTANT

## 2022-03-25 PROCEDURE — 3331090001 HH PPS REVENUE CREDIT

## 2022-03-25 PROCEDURE — 77030010507 HC ADH SKN DERMBND J&J -B

## 2022-03-25 PROCEDURE — G0299 HHS/HOSPICE OF RN EA 15 MIN: HCPCS

## 2022-03-25 PROCEDURE — 74011250636 HC RX REV CODE- 250/636: Performed by: PHYSICIAN ASSISTANT

## 2022-03-25 PROCEDURE — P9047 ALBUMIN (HUMAN), 25%, 50ML: HCPCS | Performed by: PHYSICIAN ASSISTANT

## 2022-03-25 PROCEDURE — 77030014146 HC TY THORCENT/PARACENT BD -B

## 2022-03-25 RX ORDER — ALBUMIN HUMAN 250 G/1000ML
25 SOLUTION INTRAVENOUS ONCE
Status: COMPLETED | OUTPATIENT
Start: 2022-03-25 | End: 2022-03-25

## 2022-03-25 RX ORDER — LIDOCAINE HYDROCHLORIDE 20 MG/ML
20-300 INJECTION, SOLUTION INFILTRATION; PERINEURAL
Status: DISCONTINUED | OUTPATIENT
Start: 2022-03-25 | End: 2022-03-29 | Stop reason: HOSPADM

## 2022-03-25 RX ADMIN — LIDOCAINE HYDROCHLORIDE 200 MG: 20 INJECTION, SOLUTION INFILTRATION; PERINEURAL at 11:14

## 2022-03-25 RX ADMIN — ALBUMIN (HUMAN) 25 G: 0.25 INJECTION, SOLUTION INTRAVENOUS at 11:41

## 2022-03-25 NOTE — DISCHARGE INSTRUCTIONS
[address]  Department of Interventional Radiology  [facility]  [phone] Office  [phone] Fax    PARACENTESIS DISCHARGE INSTRUCTIONS    General Information:  During this procedure, the doctor will insert a needle into the abdomen to drain fluid. After the procedure, you will be able to take a deep breath much easier. The site of the puncture may ooze the first day. This will decrease and eventually stop. Paracentesis (draining fluid from the abdomen) sometimes makes patients hypotensive (low blood pressure). Your doctor may order for you to receive fluids or albumin (a volume booster) during the procedure through an IV site. Home Care Instructions:  Keep the puncture site clean and dry. No tub baths or swimming until puncture site heals. Showering is acceptable. Resume your normal diet, and resume your normal activity slowly and as you tolerate. If you are short of breath, rest. If shortness of breath does not ease, please call your ordering doctor. Fluid can re-accumulate in the chest and/or in the abdomen. If this should occur, your doctor needs to know as you may need to have the procedure done again. Call If:     You should call your Physician and/or the Radiology Nurse if you notice any signs of infection, like pus draining, or if it is swollen or reddened. Also call if you have a fever, or if you are bleeding from the puncture site more than a small amount on the dressing. Call if the puncture site keeps draining fluid. Some oozing is to be expected, but should slow and then stop. Call if you feel like you have pressure in your abdomen. SEEK IMMEDIATE CARE OR CALL 911 IF YOU SUDDENLY HAVE TROUBLE BREATHING, OR IF YOUR LIPS TURN BLUE, OR IF YOU NOTICE BLOOD IN YOUR SPUTUM. Follow-Up Instructions: Please see your ordering doctor as he/she has requested. To Reach Us:     If you have any questions about your procedure, please call the Interventional Radiology department at [phone]. After business hours (5pm) and weekends, call the answering service at [phone] and ask for the Radiologist on call to be paged. Si tiene Preguntas acerca del procedimiento, por favor llame al departamento de Radiología Intervencional al [phone]. Después de horas de oficina (5 pm) y los fines de [place], llamar al Ailey Ish Bravo al [phone] y pregunte por el Radiologo de [facility]. Interventional Radiology General Nurse Discharge    After general anesthesia or intravenous sedation, for 24 hours or while taking prescription Narcotics:  · Limit your activities  · Do not drive and operate hazardous machinery  · Do not make important personal or business decisions  · Do  not drink alcoholic beverages  · If you have not urinated within 8 hours after discharge, please contact your surgeon on call. * Please give a list of your current medications to your Primary Care Provider. * Please update this list whenever your medications are discontinued, doses are     changed, or new medications (including over-the-counter products) are added. * Please carry medication information at all times in case of emergency situations. These are general instructions for a healthy lifestyle:    No smoking/ No tobacco products/ Avoid exposure to second hand smoke  Surgeon General's Warning:  Quitting smoking now greatly reduces serious risk to your health. Obesity, smoking, and sedentary lifestyle greatly increases your risk for illness  A healthy diet, regular physical exercise & weight monitoring are important for maintaining a healthy lifestyle    You may be retaining fluid if you have a history of heart failure or if you experience any of the following symptoms:  Weight gain of 3 pounds or more overnight or 5 pounds in a week, increased swelling in our hands or feet or shortness of breath while lying flat in bed.   Please call your doctor as soon as you notice any of these symptoms; do not wait until your next office visit. Recognize signs and symptoms of STROKE:  F-face looks uneven    A-arms unable to move or move unevenly    S-speech slurred or non-existent    T-time-call 911 as soon as signs and symptoms begin-DO NOT go       Back to bed or wait to see if you get better-TIME IS BRAIN.       Date: 3/25/2022  Discharging Nurse: Earline Al RN

## 2022-03-25 NOTE — PROGRESS NOTES
7000ml of 7L of ascitic fluid removed, 25gm of Albumin hung. Catheter pulled, tip intact. Pressure held, topical skin glue applied. Patient tolerated procedure well.

## 2022-03-26 PROCEDURE — 3331090001 HH PPS REVENUE CREDIT

## 2022-03-26 PROCEDURE — 3331090002 HH PPS REVENUE DEBIT

## 2022-03-27 PROCEDURE — 3331090002 HH PPS REVENUE DEBIT

## 2022-03-27 PROCEDURE — 3331090001 HH PPS REVENUE CREDIT

## 2022-03-28 PROCEDURE — 3331090001 HH PPS REVENUE CREDIT

## 2022-03-28 PROCEDURE — 3331090002 HH PPS REVENUE DEBIT

## 2022-03-29 PROCEDURE — 3331090002 HH PPS REVENUE DEBIT

## 2022-03-29 PROCEDURE — 3331090001 HH PPS REVENUE CREDIT

## 2022-03-30 PROCEDURE — 3331090001 HH PPS REVENUE CREDIT

## 2022-03-30 PROCEDURE — 3331090002 HH PPS REVENUE DEBIT

## 2022-03-31 PROCEDURE — 3331090002 HH PPS REVENUE DEBIT

## 2022-03-31 PROCEDURE — 3331090001 HH PPS REVENUE CREDIT

## 2022-04-01 ENCOUNTER — HOME CARE VISIT (OUTPATIENT)
Dept: SCHEDULING | Facility: HOME HEALTH | Age: 72
End: 2022-04-01
Payer: MEDICARE

## 2022-04-01 PROCEDURE — 3331090002 HH PPS REVENUE DEBIT

## 2022-04-01 PROCEDURE — 3331090001 HH PPS REVENUE CREDIT

## 2022-04-01 PROCEDURE — G0299 HHS/HOSPICE OF RN EA 15 MIN: HCPCS

## 2022-04-02 PROCEDURE — 3331090002 HH PPS REVENUE DEBIT

## 2022-04-02 PROCEDURE — 3331090001 HH PPS REVENUE CREDIT

## 2022-04-03 VITALS
OXYGEN SATURATION: 96 % | HEART RATE: 66 BPM | DIASTOLIC BLOOD PRESSURE: 66 MMHG | TEMPERATURE: 98 F | SYSTOLIC BLOOD PRESSURE: 114 MMHG | RESPIRATION RATE: 16 BRPM

## 2022-04-03 PROCEDURE — 3331090001 HH PPS REVENUE CREDIT

## 2022-04-03 PROCEDURE — 3331090002 HH PPS REVENUE DEBIT

## 2022-04-04 PROCEDURE — 3331090001 HH PPS REVENUE CREDIT

## 2022-04-04 PROCEDURE — 3331090002 HH PPS REVENUE DEBIT

## 2022-04-05 PROCEDURE — 3331090001 HH PPS REVENUE CREDIT

## 2022-04-05 PROCEDURE — 3331090002 HH PPS REVENUE DEBIT

## 2022-04-06 ENCOUNTER — APPOINTMENT (OUTPATIENT)
Dept: CT IMAGING | Age: 72
DRG: 354 | End: 2022-04-06
Attending: EMERGENCY MEDICINE
Payer: MEDICARE

## 2022-04-06 ENCOUNTER — HOSPITAL ENCOUNTER (INPATIENT)
Age: 72
LOS: 5 days | Discharge: HOME HEALTH | DRG: 354 | End: 2022-04-11
Attending: EMERGENCY MEDICINE | Admitting: FAMILY MEDICINE
Payer: MEDICARE

## 2022-04-06 ENCOUNTER — HOSPITAL ENCOUNTER (OUTPATIENT)
Dept: INTERVENTIONAL RADIOLOGY/VASCULAR | Age: 72
Discharge: HOME | DRG: 354 | End: 2022-04-06
Attending: INTERNAL MEDICINE
Payer: MEDICARE

## 2022-04-06 DIAGNOSIS — K56.609 SBO (SMALL BOWEL OBSTRUCTION) (HCC): Primary | ICD-10-CM

## 2022-04-06 DIAGNOSIS — K43.0 INCARCERATED INCISIONAL HERNIA: ICD-10-CM

## 2022-04-06 DIAGNOSIS — K42.0 INCARCERATED UMBILICAL HERNIA: ICD-10-CM

## 2022-04-06 DIAGNOSIS — K74.60 LIVER CIRRHOSIS SECONDARY TO NASH (NONALCOHOLIC STEATOHEPATITIS) (HCC): ICD-10-CM

## 2022-04-06 DIAGNOSIS — N39.0 URINARY TRACT INFECTION WITHOUT HEMATURIA, SITE UNSPECIFIED: ICD-10-CM

## 2022-04-06 DIAGNOSIS — K75.81 LIVER CIRRHOSIS SECONDARY TO NASH (NONALCOHOLIC STEATOHEPATITIS) (HCC): ICD-10-CM

## 2022-04-06 DIAGNOSIS — R18.8 ASCITES: ICD-10-CM

## 2022-04-06 LAB
ALBUMIN SERPL-MCNC: 2.6 G/DL (ref 3.2–4.6)
ALBUMIN/GLOB SERPL: 0.6 {RATIO} (ref 1.2–3.5)
ALP SERPL-CCNC: 227 U/L (ref 50–130)
ALT SERPL-CCNC: 33 U/L (ref 12–65)
ANION GAP SERPL CALC-SCNC: 10 MMOL/L (ref 7–16)
AST SERPL-CCNC: 60 U/L (ref 15–37)
BACTERIA URNS QL MICRO: ABNORMAL /HPF
BASOPHILS # BLD: 0 K/UL (ref 0–0.2)
BASOPHILS NFR BLD: 1 % (ref 0–2)
BILIRUB SERPL-MCNC: 1.5 MG/DL (ref 0.2–1.1)
BUN SERPL-MCNC: 15 MG/DL (ref 8–23)
CALCIUM SERPL-MCNC: 8.5 MG/DL (ref 8.3–10.4)
CASTS URNS QL MICRO: 0 /LPF
CHLORIDE SERPL-SCNC: 104 MMOL/L (ref 98–107)
CO2 SERPL-SCNC: 22 MMOL/L (ref 21–32)
CREAT SERPL-MCNC: 0.95 MG/DL (ref 0.6–1)
CRYSTALS URNS QL MICRO: 0 /LPF
DIFFERENTIAL METHOD BLD: ABNORMAL
EOSINOPHIL # BLD: 0.1 K/UL (ref 0–0.8)
EOSINOPHIL NFR BLD: 3 % (ref 0.5–7.8)
EPI CELLS #/AREA URNS HPF: ABNORMAL /HPF
ERYTHROCYTE [DISTWIDTH] IN BLOOD BY AUTOMATED COUNT: 14.8 % (ref 11.9–14.6)
GLOBULIN SER CALC-MCNC: 4.7 G/DL (ref 2.3–3.5)
GLUCOSE SERPL-MCNC: 281 MG/DL (ref 65–100)
HCT VFR BLD AUTO: 35.9 % (ref 35.8–46.3)
HGB BLD-MCNC: 12.2 G/DL (ref 11.7–15.4)
IMM GRANULOCYTES # BLD AUTO: 0.1 K/UL (ref 0–0.5)
IMM GRANULOCYTES NFR BLD AUTO: 1 % (ref 0–5)
LACTATE SERPL-SCNC: 2.1 MMOL/L (ref 0.4–2)
LYMPHOCYTES # BLD: 0.6 K/UL (ref 0.5–4.6)
LYMPHOCYTES NFR BLD: 11 % (ref 13–44)
MCH RBC QN AUTO: 28.7 PG (ref 26.1–32.9)
MCHC RBC AUTO-ENTMCNC: 34 G/DL (ref 31.4–35)
MCV RBC AUTO: 84.5 FL (ref 79.6–97.8)
MONOCYTES # BLD: 0.6 K/UL (ref 0.1–1.3)
MONOCYTES NFR BLD: 10 % (ref 4–12)
MUCOUS THREADS URNS QL MICRO: 0 /LPF
NEUTS SEG # BLD: 4.2 K/UL (ref 1.7–8.2)
NEUTS SEG NFR BLD: 75 % (ref 43–78)
NRBC # BLD: 0 K/UL (ref 0–0.2)
OTHER OBSERVATIONS,UCOM: ABNORMAL
PLATELET # BLD AUTO: 220 K/UL (ref 150–450)
PMV BLD AUTO: 11.6 FL (ref 9.4–12.3)
POTASSIUM SERPL-SCNC: 4 MMOL/L (ref 3.5–5.1)
PROT SERPL-MCNC: 7.3 G/DL (ref 6.3–8.2)
RBC # BLD AUTO: 4.25 M/UL (ref 4.05–5.2)
RBC #/AREA URNS HPF: ABNORMAL /HPF
SODIUM SERPL-SCNC: 136 MMOL/L (ref 136–145)
WBC # BLD AUTO: 5.6 K/UL (ref 4.3–11.1)
WBC URNS QL MICRO: >100 /HPF

## 2022-04-06 PROCEDURE — 83605 ASSAY OF LACTIC ACID: CPT

## 2022-04-06 PROCEDURE — 74011250636 HC RX REV CODE- 250/636: Performed by: EMERGENCY MEDICINE

## 2022-04-06 PROCEDURE — 87086 URINE CULTURE/COLONY COUNT: CPT

## 2022-04-06 PROCEDURE — 3331090002 HH PPS REVENUE DEBIT

## 2022-04-06 PROCEDURE — 81015 MICROSCOPIC EXAM OF URINE: CPT

## 2022-04-06 PROCEDURE — 74011250636 HC RX REV CODE- 250/636: Performed by: PHYSICIAN ASSISTANT

## 2022-04-06 PROCEDURE — P9047 ALBUMIN (HUMAN), 25%, 50ML: HCPCS | Performed by: PHYSICIAN ASSISTANT

## 2022-04-06 PROCEDURE — 2709999900 HC NON-CHARGEABLE SUPPLY

## 2022-04-06 PROCEDURE — 77030014146 HC TY THORCENT/PARACENT BD -B

## 2022-04-06 PROCEDURE — 96375 TX/PRO/DX INJ NEW DRUG ADDON: CPT

## 2022-04-06 PROCEDURE — 74011000258 HC RX REV CODE- 258: Performed by: EMERGENCY MEDICINE

## 2022-04-06 PROCEDURE — 77030010507 HC ADH SKN DERMBND J&J -B

## 2022-04-06 PROCEDURE — 85025 COMPLETE CBC W/AUTO DIFF WBC: CPT

## 2022-04-06 PROCEDURE — 96365 THER/PROPH/DIAG IV INF INIT: CPT

## 2022-04-06 PROCEDURE — 65270000029 HC RM PRIVATE

## 2022-04-06 PROCEDURE — 74011000636 HC RX REV CODE- 636: Performed by: EMERGENCY MEDICINE

## 2022-04-06 PROCEDURE — 49083 ABD PARACENTESIS W/IMAGING: CPT

## 2022-04-06 PROCEDURE — 80053 COMPREHEN METABOLIC PANEL: CPT

## 2022-04-06 PROCEDURE — 74177 CT ABD & PELVIS W/CONTRAST: CPT

## 2022-04-06 PROCEDURE — 3331090001 HH PPS REVENUE CREDIT

## 2022-04-06 PROCEDURE — 99285 EMERGENCY DEPT VISIT HI MDM: CPT

## 2022-04-06 RX ORDER — SODIUM CHLORIDE 9 MG/ML
150 INJECTION, SOLUTION INTRAVENOUS CONTINUOUS
Status: DISCONTINUED | OUTPATIENT
Start: 2022-04-06 | End: 2022-04-07

## 2022-04-06 RX ORDER — LIDOCAINE HYDROCHLORIDE 20 MG/ML
1-10 INJECTION, SOLUTION INFILTRATION; PERINEURAL
Status: DISCONTINUED | OUTPATIENT
Start: 2022-04-06 | End: 2022-04-10 | Stop reason: HOSPADM

## 2022-04-06 RX ORDER — MORPHINE SULFATE 4 MG/ML
4 INJECTION INTRAVENOUS
Status: COMPLETED | OUTPATIENT
Start: 2022-04-06 | End: 2022-04-06

## 2022-04-06 RX ORDER — SODIUM CHLORIDE 0.9 % (FLUSH) 0.9 %
10 SYRINGE (ML) INJECTION
Status: COMPLETED | OUTPATIENT
Start: 2022-04-06 | End: 2022-04-06

## 2022-04-06 RX ORDER — FLUOXETINE HYDROCHLORIDE 40 MG/1
40 CAPSULE ORAL DAILY
COMMUNITY
End: 2022-04-18 | Stop reason: SDUPTHER

## 2022-04-06 RX ORDER — ALBUMIN HUMAN 250 G/1000ML
25 SOLUTION INTRAVENOUS ONCE
Status: COMPLETED | OUTPATIENT
Start: 2022-04-06 | End: 2022-04-06

## 2022-04-06 RX ORDER — ONDANSETRON 2 MG/ML
4 INJECTION INTRAMUSCULAR; INTRAVENOUS
Status: COMPLETED | OUTPATIENT
Start: 2022-04-06 | End: 2022-04-06

## 2022-04-06 RX ADMIN — ONDANSETRON 4 MG: 2 INJECTION INTRAMUSCULAR; INTRAVENOUS at 21:32

## 2022-04-06 RX ADMIN — SODIUM CHLORIDE 150 ML/HR: 900 INJECTION, SOLUTION INTRAVENOUS at 22:30

## 2022-04-06 RX ADMIN — MORPHINE SULFATE 4 MG: 4 INJECTION INTRAVENOUS at 21:32

## 2022-04-06 RX ADMIN — SODIUM CHLORIDE 100 ML: 9 INJECTION, SOLUTION INTRAVENOUS at 22:29

## 2022-04-06 RX ADMIN — Medication 10 ML: at 22:29

## 2022-04-06 RX ADMIN — ALBUMIN (HUMAN) 25 G: 0.25 INJECTION, SOLUTION INTRAVENOUS at 15:01

## 2022-04-06 RX ADMIN — IOPAMIDOL 100 ML: 755 INJECTION, SOLUTION INTRAVENOUS at 22:27

## 2022-04-06 RX ADMIN — CEFTRIAXONE 1 G: 1 INJECTION, POWDER, FOR SOLUTION INTRAMUSCULAR; INTRAVENOUS at 23:22

## 2022-04-06 NOTE — PROGRESS NOTES
Interventional Radiology Post Paracentesis/Thoracentesis Note    4/6/2022    Procedure(s): Ultrasound guided Therapeutic Paracentesis Performed with 8 Azeri catheter total volume 8650 ml. Preliminary Findings: large yellow. Complications: None    Plan:  Observation, check labs if drawn.           Chest X-Ray:  no

## 2022-04-06 NOTE — DISCHARGE INSTRUCTIONS
[address]  Department of Interventional Radiology  [facility]  [phone] Office  [phone] Fax    PARACENTESIS DISCHARGE INSTRUCTIONS    General Information:  During this procedure, the doctor will insert a needle into the abdomen to drain fluid. After the procedure, you will be able to take a deep breath much easier. The site of the puncture may ooze the first day. This will decrease and eventually stop. Paracentesis (draining fluid from the abdomen) sometimes makes patients hypotensive (low blood pressure). Your doctor may order for you to receive fluids or albumin (a volume booster) during the procedure through an IV site. Home Care Instructions:  Keep the puncture site clean and dry. No tub baths or swimming until puncture site heals. Showering is acceptable. Resume your normal diet, and resume your normal activity slowly and as you tolerate. If you are short of breath, rest. If shortness of breath does not ease, please call your ordering doctor. Fluid can re-accumulate in the chest and/or in the abdomen. If this should occur, your doctor needs to know as you may need to have the procedure done again. Call If:     You should call your Physician and/or the Radiology Nurse if you notice any signs of infection, like pus draining, or if it is swollen or reddened. Also call if you have a fever, or if you are bleeding from the puncture site more than a small amount on the dressing. Call if the puncture site keeps draining fluid. Some oozing is to be expected, but should slow and then stop. Call if you feel like you have pressure in your abdomen. SEEK IMMEDIATE CARE OR CALL 911 IF YOU SUDDENLY HAVE TROUBLE BREATHING, OR IF YOUR LIPS TURN BLUE, OR IF YOU NOTICE BLOOD IN YOUR SPUTUM. Follow-Up Instructions: Please see your ordering doctor as he/she has requested. To Reach Us:   If you have any questions about your procedure, please call the Interventional Radiology department at [phone]. After business hours (5pm) and weekends, call the answering service at [phone] and ask for the Radiologist on call to be paged. Si tiene Preguntas acerca del procedimiento, por favor llame al departamento de Radiología Intervencional al [phone]. Después de horas de oficina (5 pm) y los fines de [place], llamar al Sylvia Bravo al [phone] y pregunte por el Radiologo de [facility]. Interventional Radiology General Nurse Discharge    After general anesthesia or intravenous sedation, for 24 hours or while taking prescription Narcotics:  · Limit your activities  · Do not drive and operate hazardous machinery  · Do not make important personal or business decisions  · Do  not drink alcoholic beverages  · If you have not urinated within 8 hours after discharge, please contact your surgeon on call. * Please give a list of your current medications to your Primary Care Provider. * Please update this list whenever your medications are discontinued, doses are     changed, or new medications (including over-the-counter products) are added. * Please carry medication information at all times in case of emergency situations. These are general instructions for a healthy lifestyle:    No smoking/ No tobacco products/ Avoid exposure to second hand smoke  Surgeon General's Warning:  Quitting smoking now greatly reduces serious risk to your health. Obesity, smoking, and sedentary lifestyle greatly increases your risk for illness  A healthy diet, regular physical exercise & weight monitoring are important for maintaining a healthy lifestyle    You may be retaining fluid if you have a history of heart failure or if you experience any of the following symptoms:  Weight gain of 3 pounds or more overnight or 5 pounds in a week, increased swelling in our hands or feet or shortness of breath while lying flat in bed.   Please call your doctor as soon as you notice any of these symptoms; do not wait until your next office visit. Recognize signs and symptoms of STROKE:  F-face looks uneven    A-arms unable to move or move unevenly    S-speech slurred or non-existent    T-time-call 911 as soon as signs and symptoms begin-DO NOT go       Back to bed or wait to see if you get better-TIME IS BRAIN.

## 2022-04-07 ENCOUNTER — APPOINTMENT (OUTPATIENT)
Dept: GENERAL RADIOLOGY | Age: 72
DRG: 354 | End: 2022-04-07
Attending: INTERNAL MEDICINE
Payer: MEDICARE

## 2022-04-07 ENCOUNTER — ANESTHESIA EVENT (OUTPATIENT)
Dept: SURGERY | Age: 72
DRG: 354 | End: 2022-04-07
Payer: MEDICARE

## 2022-04-07 ENCOUNTER — ANESTHESIA (OUTPATIENT)
Dept: SURGERY | Age: 72
DRG: 354 | End: 2022-04-07
Payer: MEDICARE

## 2022-04-07 ENCOUNTER — APPOINTMENT (OUTPATIENT)
Dept: GENERAL RADIOLOGY | Age: 72
DRG: 354 | End: 2022-04-07
Attending: FAMILY MEDICINE
Payer: MEDICARE

## 2022-04-07 ENCOUNTER — HOME CARE VISIT (OUTPATIENT)
Dept: HOME HEALTH SERVICES | Facility: HOME HEALTH | Age: 72
End: 2022-04-07
Payer: MEDICARE

## 2022-04-07 VITALS
DIASTOLIC BLOOD PRESSURE: 70 MMHG | RESPIRATION RATE: 16 BRPM | TEMPERATURE: 98.2 F | HEART RATE: 76 BPM | OXYGEN SATURATION: 93 % | SYSTOLIC BLOOD PRESSURE: 145 MMHG

## 2022-04-07 PROBLEM — K74.60 LIVER CIRRHOSIS SECONDARY TO NASH (NONALCOHOLIC STEATOHEPATITIS) (HCC): Status: ACTIVE | Noted: 2021-04-26

## 2022-04-07 PROBLEM — I25.10 CAD (CORONARY ARTERY DISEASE): Status: ACTIVE | Noted: 2022-04-07

## 2022-04-07 PROBLEM — R11.2 INTRACTABLE NAUSEA AND VOMITING: Status: ACTIVE | Noted: 2022-04-07

## 2022-04-07 LAB
ALBUMIN SERPL-MCNC: 2.3 G/DL (ref 3.2–4.6)
ALBUMIN/GLOB SERPL: 0.5 {RATIO} (ref 1.2–3.5)
ALP SERPL-CCNC: 187 U/L (ref 50–136)
ALT SERPL-CCNC: 31 U/L (ref 12–65)
ANION GAP SERPL CALC-SCNC: 6 MMOL/L (ref 7–16)
AST SERPL-CCNC: 35 U/L (ref 15–37)
BASOPHILS # BLD: 0 K/UL (ref 0–0.2)
BASOPHILS NFR BLD: 1 % (ref 0–2)
BILIRUB SERPL-MCNC: 1.2 MG/DL (ref 0.2–1.1)
BUN SERPL-MCNC: 14 MG/DL (ref 8–23)
CALCIUM SERPL-MCNC: 8.3 MG/DL (ref 8.3–10.4)
CHLORIDE SERPL-SCNC: 104 MMOL/L (ref 98–107)
CO2 SERPL-SCNC: 27 MMOL/L (ref 21–32)
CREAT SERPL-MCNC: 0.81 MG/DL (ref 0.6–1)
DIFFERENTIAL METHOD BLD: ABNORMAL
EOSINOPHIL # BLD: 0.1 K/UL (ref 0–0.8)
EOSINOPHIL NFR BLD: 1 % (ref 0.5–7.8)
ERYTHROCYTE [DISTWIDTH] IN BLOOD BY AUTOMATED COUNT: 14.7 % (ref 11.9–14.6)
GLOBULIN SER CALC-MCNC: 4.5 G/DL (ref 2.3–3.5)
GLUCOSE BLD STRIP.AUTO-MCNC: 221 MG/DL (ref 65–100)
GLUCOSE BLD STRIP.AUTO-MCNC: 249 MG/DL (ref 65–100)
GLUCOSE BLD STRIP.AUTO-MCNC: 275 MG/DL (ref 65–100)
GLUCOSE BLD STRIP.AUTO-MCNC: 276 MG/DL (ref 65–100)
GLUCOSE BLD STRIP.AUTO-MCNC: 302 MG/DL (ref 65–100)
GLUCOSE SERPL-MCNC: 312 MG/DL (ref 65–100)
HCT VFR BLD AUTO: 35.1 % (ref 35.8–46.3)
HGB BLD-MCNC: 11.6 G/DL (ref 11.7–15.4)
IMM GRANULOCYTES # BLD AUTO: 0.1 K/UL (ref 0–0.5)
IMM GRANULOCYTES NFR BLD AUTO: 1 % (ref 0–5)
LACTATE SERPL-SCNC: 1.3 MMOL/L (ref 0.4–2)
LYMPHOCYTES # BLD: 0.6 K/UL (ref 0.5–4.6)
LYMPHOCYTES NFR BLD: 12 % (ref 13–44)
MCH RBC QN AUTO: 28.8 PG (ref 26.1–32.9)
MCHC RBC AUTO-ENTMCNC: 33 G/DL (ref 31.4–35)
MCV RBC AUTO: 87.1 FL (ref 79.6–97.8)
MONOCYTES # BLD: 0.6 K/UL (ref 0.1–1.3)
MONOCYTES NFR BLD: 12 % (ref 4–12)
NEUTS SEG # BLD: 3.8 K/UL (ref 1.7–8.2)
NEUTS SEG NFR BLD: 73 % (ref 43–78)
NRBC # BLD: 0 K/UL (ref 0–0.2)
PLATELET # BLD AUTO: 170 K/UL (ref 150–450)
PMV BLD AUTO: 10.3 FL (ref 9.4–12.3)
POTASSIUM SERPL-SCNC: 4.1 MMOL/L (ref 3.5–5.1)
PROT SERPL-MCNC: 6.8 G/DL (ref 6.3–8.2)
RBC # BLD AUTO: 4.03 M/UL (ref 4.05–5.2)
SERVICE CMNT-IMP: ABNORMAL
SODIUM SERPL-SCNC: 137 MMOL/L (ref 136–145)
WBC # BLD AUTO: 5.1 K/UL (ref 4.3–11.1)

## 2022-04-07 PROCEDURE — 80053 COMPREHEN METABOLIC PANEL: CPT

## 2022-04-07 PROCEDURE — 74011000250 HC RX REV CODE- 250: Performed by: SURGERY

## 2022-04-07 PROCEDURE — 77030008462 HC STPLR SKN PROX J&J -A: Performed by: SURGERY

## 2022-04-07 PROCEDURE — 77030037088 HC TUBE ENDOTRACH ORAL NSL COVD-A: Performed by: NURSE ANESTHETIST, CERTIFIED REGISTERED

## 2022-04-07 PROCEDURE — 77030002986 HC SUT PROL J&J -A: Performed by: SURGERY

## 2022-04-07 PROCEDURE — 82962 GLUCOSE BLOOD TEST: CPT

## 2022-04-07 PROCEDURE — 2709999900 HC NON-CHARGEABLE SUPPLY

## 2022-04-07 PROCEDURE — 77030040361 HC SLV COMPR DVT MDII -B: Performed by: SURGERY

## 2022-04-07 PROCEDURE — 74011250636 HC RX REV CODE- 250/636: Performed by: NURSE PRACTITIONER

## 2022-04-07 PROCEDURE — 49568 PR IMPLANTATION OF MESH OR OTHER PROSTHESIS FOR OPEN INCISIONAL OR VENTRAL HERNIA REPAIR OR MESH FOR CLOSURE OF DEBRIDEMENT FOR NECROTIZING SOFT TISSUE INFECTION: CPT | Performed by: SURGERY

## 2022-04-07 PROCEDURE — 77030039425 HC BLD LARYNG TRULITE DISP TELE -A: Performed by: NURSE ANESTHETIST, CERTIFIED REGISTERED

## 2022-04-07 PROCEDURE — 3331090002 HH PPS REVENUE DEBIT

## 2022-04-07 PROCEDURE — 74011000250 HC RX REV CODE- 250: Performed by: FAMILY MEDICINE

## 2022-04-07 PROCEDURE — 74011250636 HC RX REV CODE- 250/636: Performed by: INTERNAL MEDICINE

## 2022-04-07 PROCEDURE — 77030002966 HC SUT PDS J&J -A: Performed by: SURGERY

## 2022-04-07 PROCEDURE — C1781 MESH (IMPLANTABLE): HCPCS | Performed by: SURGERY

## 2022-04-07 PROCEDURE — 74011250636 HC RX REV CODE- 250/636: Performed by: FAMILY MEDICINE

## 2022-04-07 PROCEDURE — 74011250636 HC RX REV CODE- 250/636: Performed by: NURSE ANESTHETIST, CERTIFIED REGISTERED

## 2022-04-07 PROCEDURE — 74018 RADEX ABDOMEN 1 VIEW: CPT

## 2022-04-07 PROCEDURE — 85025 COMPLETE CBC W/AUTO DIFF WBC: CPT

## 2022-04-07 PROCEDURE — 77030040922 HC BLNKT HYPOTHRM STRY -A: Performed by: NURSE ANESTHETIST, CERTIFIED REGISTERED

## 2022-04-07 PROCEDURE — 74011250636 HC RX REV CODE- 250/636: Performed by: EMERGENCY MEDICINE

## 2022-04-07 PROCEDURE — 76210000006 HC OR PH I REC 0.5 TO 1 HR: Performed by: SURGERY

## 2022-04-07 PROCEDURE — P9045 ALBUMIN (HUMAN), 5%, 250 ML: HCPCS | Performed by: NURSE ANESTHETIST, CERTIFIED REGISTERED

## 2022-04-07 PROCEDURE — 65270000029 HC RM PRIVATE

## 2022-04-07 PROCEDURE — 76060000033 HC ANESTHESIA 1 TO 1.5 HR: Performed by: SURGERY

## 2022-04-07 PROCEDURE — 74011000250 HC RX REV CODE- 250: Performed by: NURSE ANESTHETIST, CERTIFIED REGISTERED

## 2022-04-07 PROCEDURE — 74011000258 HC RX REV CODE- 258: Performed by: FAMILY MEDICINE

## 2022-04-07 PROCEDURE — 83605 ASSAY OF LACTIC ACID: CPT

## 2022-04-07 PROCEDURE — C9113 INJ PANTOPRAZOLE SODIUM, VIA: HCPCS | Performed by: NURSE PRACTITIONER

## 2022-04-07 PROCEDURE — 3331090001 HH PPS REVENUE CREDIT

## 2022-04-07 PROCEDURE — 77030031139 HC SUT VCRL2 J&J -A: Performed by: SURGERY

## 2022-04-07 PROCEDURE — 0WUF0JZ SUPPLEMENT ABDOMINAL WALL WITH SYNTHETIC SUBSTITUTE, OPEN APPROACH: ICD-10-PCS | Performed by: SURGERY

## 2022-04-07 PROCEDURE — 76010000161 HC OR TIME 1 TO 1.5 HR INTENSV-TIER 1: Performed by: SURGERY

## 2022-04-07 PROCEDURE — 49561 PR REPAIR INCISIONAL HERNIA,STRANG: CPT | Performed by: SURGERY

## 2022-04-07 PROCEDURE — 74011000250 HC RX REV CODE- 250: Performed by: NURSE PRACTITIONER

## 2022-04-07 PROCEDURE — 2709999900 HC NON-CHARGEABLE SUPPLY: Performed by: SURGERY

## 2022-04-07 PROCEDURE — 74011250636 HC RX REV CODE- 250/636: Performed by: SURGERY

## 2022-04-07 PROCEDURE — 74011636637 HC RX REV CODE- 636/637: Performed by: FAMILY MEDICINE

## 2022-04-07 PROCEDURE — 99223 1ST HOSP IP/OBS HIGH 75: CPT | Performed by: SURGERY

## 2022-04-07 DEVICE — MESH HERN L DIA8CM VENTRAL POLYPR EPTFE CIR SELF EXP PTCH: Type: IMPLANTABLE DEVICE | Site: ABDOMEN | Status: FUNCTIONAL

## 2022-04-07 RX ORDER — ACETAMINOPHEN 325 MG/1
650 TABLET ORAL
Status: DISCONTINUED | OUTPATIENT
Start: 2022-04-07 | End: 2022-04-11 | Stop reason: HOSPADM

## 2022-04-07 RX ORDER — MORPHINE SULFATE 2 MG/ML
2 INJECTION, SOLUTION INTRAMUSCULAR; INTRAVENOUS
Status: DISCONTINUED | OUTPATIENT
Start: 2022-04-07 | End: 2022-04-07

## 2022-04-07 RX ORDER — SODIUM CHLORIDE 0.9 % (FLUSH) 0.9 %
5-40 SYRINGE (ML) INJECTION AS NEEDED
Status: DISCONTINUED | OUTPATIENT
Start: 2022-04-07 | End: 2022-04-11 | Stop reason: HOSPADM

## 2022-04-07 RX ORDER — PROPOFOL 10 MG/ML
INJECTION, EMULSION INTRAVENOUS AS NEEDED
Status: DISCONTINUED | OUTPATIENT
Start: 2022-04-07 | End: 2022-04-07 | Stop reason: HOSPADM

## 2022-04-07 RX ORDER — HYDROMORPHONE HYDROCHLORIDE 1 MG/ML
0.2 INJECTION, SOLUTION INTRAMUSCULAR; INTRAVENOUS; SUBCUTANEOUS
Status: DISCONTINUED | OUTPATIENT
Start: 2022-04-07 | End: 2022-04-07

## 2022-04-07 RX ORDER — ONDANSETRON 2 MG/ML
INJECTION INTRAMUSCULAR; INTRAVENOUS AS NEEDED
Status: DISCONTINUED | OUTPATIENT
Start: 2022-04-07 | End: 2022-04-07 | Stop reason: HOSPADM

## 2022-04-07 RX ORDER — LIDOCAINE HYDROCHLORIDE 20 MG/ML
INJECTION, SOLUTION EPIDURAL; INFILTRATION; INTRACAUDAL; PERINEURAL AS NEEDED
Status: DISCONTINUED | OUTPATIENT
Start: 2022-04-07 | End: 2022-04-07 | Stop reason: HOSPADM

## 2022-04-07 RX ORDER — FENTANYL CITRATE 50 UG/ML
100 INJECTION, SOLUTION INTRAMUSCULAR; INTRAVENOUS ONCE
Status: CANCELLED | OUTPATIENT
Start: 2022-04-07 | End: 2022-04-07

## 2022-04-07 RX ORDER — OXYCODONE HYDROCHLORIDE 5 MG/1
5-15 TABLET ORAL
Status: DISCONTINUED | OUTPATIENT
Start: 2022-04-07 | End: 2022-04-11 | Stop reason: HOSPADM

## 2022-04-07 RX ORDER — INSULIN LISPRO 100 [IU]/ML
INJECTION, SOLUTION INTRAVENOUS; SUBCUTANEOUS EVERY 6 HOURS
Status: DISCONTINUED | OUTPATIENT
Start: 2022-04-07 | End: 2022-04-10

## 2022-04-07 RX ORDER — ONDANSETRON 4 MG/1
4 TABLET, ORALLY DISINTEGRATING ORAL
Status: DISCONTINUED | OUTPATIENT
Start: 2022-04-07 | End: 2022-04-11 | Stop reason: HOSPADM

## 2022-04-07 RX ORDER — OXYCODONE HYDROCHLORIDE 5 MG/1
5 TABLET ORAL
Status: DISCONTINUED | OUTPATIENT
Start: 2022-04-07 | End: 2022-04-07 | Stop reason: HOSPADM

## 2022-04-07 RX ORDER — HYDROMORPHONE HYDROCHLORIDE 2 MG/ML
0.5 INJECTION, SOLUTION INTRAMUSCULAR; INTRAVENOUS; SUBCUTANEOUS
Status: DISCONTINUED | OUTPATIENT
Start: 2022-04-07 | End: 2022-04-07 | Stop reason: HOSPADM

## 2022-04-07 RX ORDER — MIDAZOLAM HYDROCHLORIDE 1 MG/ML
2 INJECTION, SOLUTION INTRAMUSCULAR; INTRAVENOUS ONCE
Status: CANCELLED | OUTPATIENT
Start: 2022-04-07 | End: 2022-04-07

## 2022-04-07 RX ORDER — POLYETHYLENE GLYCOL 3350 17 G/17G
17 POWDER, FOR SOLUTION ORAL DAILY PRN
Status: DISCONTINUED | OUTPATIENT
Start: 2022-04-07 | End: 2022-04-09

## 2022-04-07 RX ORDER — FENTANYL CITRATE 50 UG/ML
INJECTION, SOLUTION INTRAMUSCULAR; INTRAVENOUS AS NEEDED
Status: DISCONTINUED | OUTPATIENT
Start: 2022-04-07 | End: 2022-04-07 | Stop reason: HOSPADM

## 2022-04-07 RX ORDER — SODIUM CHLORIDE 9 MG/ML
75 INJECTION, SOLUTION INTRAVENOUS CONTINUOUS
Status: DISCONTINUED | OUTPATIENT
Start: 2022-04-07 | End: 2022-04-10

## 2022-04-07 RX ORDER — SODIUM CHLORIDE, SODIUM LACTATE, POTASSIUM CHLORIDE, CALCIUM CHLORIDE 600; 310; 30; 20 MG/100ML; MG/100ML; MG/100ML; MG/100ML
100 INJECTION, SOLUTION INTRAVENOUS CONTINUOUS
Status: DISCONTINUED | OUTPATIENT
Start: 2022-04-07 | End: 2022-04-07 | Stop reason: HOSPADM

## 2022-04-07 RX ORDER — ROCURONIUM BROMIDE 10 MG/ML
INJECTION, SOLUTION INTRAVENOUS AS NEEDED
Status: DISCONTINUED | OUTPATIENT
Start: 2022-04-07 | End: 2022-04-07 | Stop reason: HOSPADM

## 2022-04-07 RX ORDER — NALOXONE HYDROCHLORIDE 0.4 MG/ML
0.1 INJECTION, SOLUTION INTRAMUSCULAR; INTRAVENOUS; SUBCUTANEOUS AS NEEDED
Status: DISCONTINUED | OUTPATIENT
Start: 2022-04-07 | End: 2022-04-07 | Stop reason: HOSPADM

## 2022-04-07 RX ORDER — OXYCODONE HYDROCHLORIDE 5 MG/1
10 TABLET ORAL
Status: DISCONTINUED | OUTPATIENT
Start: 2022-04-07 | End: 2022-04-07 | Stop reason: HOSPADM

## 2022-04-07 RX ORDER — SODIUM CHLORIDE 0.9 % (FLUSH) 0.9 %
5-40 SYRINGE (ML) INJECTION EVERY 8 HOURS
Status: DISCONTINUED | OUTPATIENT
Start: 2022-04-07 | End: 2022-04-11 | Stop reason: HOSPADM

## 2022-04-07 RX ORDER — LIDOCAINE HYDROCHLORIDE 10 MG/ML
0.1 INJECTION INFILTRATION; PERINEURAL AS NEEDED
Status: CANCELLED | OUTPATIENT
Start: 2022-04-07

## 2022-04-07 RX ORDER — EPHEDRINE SULFATE/0.9% NACL/PF 50 MG/5 ML
SYRINGE (ML) INTRAVENOUS AS NEEDED
Status: DISCONTINUED | OUTPATIENT
Start: 2022-04-07 | End: 2022-04-07 | Stop reason: HOSPADM

## 2022-04-07 RX ORDER — ALBUTEROL SULFATE 0.83 MG/ML
2.5 SOLUTION RESPIRATORY (INHALATION) AS NEEDED
Status: DISCONTINUED | OUTPATIENT
Start: 2022-04-07 | End: 2022-04-07 | Stop reason: HOSPADM

## 2022-04-07 RX ORDER — ALBUMIN HUMAN 50 G/1000ML
SOLUTION INTRAVENOUS AS NEEDED
Status: DISCONTINUED | OUTPATIENT
Start: 2022-04-07 | End: 2022-04-07 | Stop reason: HOSPADM

## 2022-04-07 RX ORDER — MIDAZOLAM HYDROCHLORIDE 1 MG/ML
2 INJECTION, SOLUTION INTRAMUSCULAR; INTRAVENOUS
Status: CANCELLED | OUTPATIENT
Start: 2022-04-07 | End: 2022-04-08

## 2022-04-07 RX ORDER — SODIUM CHLORIDE, SODIUM LACTATE, POTASSIUM CHLORIDE, CALCIUM CHLORIDE 600; 310; 30; 20 MG/100ML; MG/100ML; MG/100ML; MG/100ML
100 INJECTION, SOLUTION INTRAVENOUS CONTINUOUS
Status: CANCELLED | OUTPATIENT
Start: 2022-04-07 | End: 2022-04-08

## 2022-04-07 RX ORDER — SUCCINYLCHOLINE CHLORIDE 20 MG/ML
INJECTION INTRAMUSCULAR; INTRAVENOUS AS NEEDED
Status: DISCONTINUED | OUTPATIENT
Start: 2022-04-07 | End: 2022-04-07 | Stop reason: HOSPADM

## 2022-04-07 RX ORDER — ONDANSETRON 2 MG/ML
4 INJECTION INTRAMUSCULAR; INTRAVENOUS
Status: DISCONTINUED | OUTPATIENT
Start: 2022-04-07 | End: 2022-04-11 | Stop reason: HOSPADM

## 2022-04-07 RX ORDER — ONDANSETRON 2 MG/ML
4 INJECTION INTRAMUSCULAR; INTRAVENOUS ONCE
Status: DISCONTINUED | OUTPATIENT
Start: 2022-04-07 | End: 2022-04-07 | Stop reason: HOSPADM

## 2022-04-07 RX ORDER — DIPHENHYDRAMINE HYDROCHLORIDE 50 MG/ML
12.5 INJECTION, SOLUTION INTRAMUSCULAR; INTRAVENOUS
Status: DISCONTINUED | OUTPATIENT
Start: 2022-04-07 | End: 2022-04-07 | Stop reason: HOSPADM

## 2022-04-07 RX ORDER — ACETAMINOPHEN 650 MG/1
650 SUPPOSITORY RECTAL
Status: DISCONTINUED | OUTPATIENT
Start: 2022-04-07 | End: 2022-04-11 | Stop reason: HOSPADM

## 2022-04-07 RX ORDER — HYDROMORPHONE HYDROCHLORIDE 1 MG/ML
1 INJECTION, SOLUTION INTRAMUSCULAR; INTRAVENOUS; SUBCUTANEOUS ONCE
Status: COMPLETED | OUTPATIENT
Start: 2022-04-07 | End: 2022-04-07

## 2022-04-07 RX ORDER — HYDROMORPHONE HYDROCHLORIDE 1 MG/ML
0.5 INJECTION, SOLUTION INTRAMUSCULAR; INTRAVENOUS; SUBCUTANEOUS
Status: DISCONTINUED | OUTPATIENT
Start: 2022-04-07 | End: 2022-04-11 | Stop reason: HOSPADM

## 2022-04-07 RX ORDER — BUPIVACAINE HYDROCHLORIDE AND EPINEPHRINE 2.5; 5 MG/ML; UG/ML
INJECTION, SOLUTION EPIDURAL; INFILTRATION; INTRACAUDAL; PERINEURAL AS NEEDED
Status: DISCONTINUED | OUTPATIENT
Start: 2022-04-07 | End: 2022-04-07 | Stop reason: HOSPADM

## 2022-04-07 RX ADMIN — Medication 8 UNITS: at 01:24

## 2022-04-07 RX ADMIN — HYDROMORPHONE HYDROCHLORIDE 0.2 MG: 1 INJECTION, SOLUTION INTRAMUSCULAR; INTRAVENOUS; SUBCUTANEOUS at 13:52

## 2022-04-07 RX ADMIN — ONDANSETRON 4 MG: 2 INJECTION INTRAMUSCULAR; INTRAVENOUS at 13:52

## 2022-04-07 RX ADMIN — Medication 6 UNITS: at 06:33

## 2022-04-07 RX ADMIN — SUCCINYLCHOLINE CHLORIDE 140 MG: 20 INJECTION, SOLUTION INTRAMUSCULAR; INTRAVENOUS at 19:22

## 2022-04-07 RX ADMIN — MORPHINE SULFATE 2 MG: 2 INJECTION, SOLUTION INTRAMUSCULAR; INTRAVENOUS at 04:43

## 2022-04-07 RX ADMIN — PROPOFOL 130 MG: 10 INJECTION, EMULSION INTRAVENOUS at 19:22

## 2022-04-07 RX ADMIN — LIDOCAINE HYDROCHLORIDE 60 MG: 20 INJECTION, SOLUTION EPIDURAL; INFILTRATION; INTRACAUDAL; PERINEURAL at 19:22

## 2022-04-07 RX ADMIN — HYDROMORPHONE HYDROCHLORIDE 0.2 MG: 1 INJECTION, SOLUTION INTRAMUSCULAR; INTRAVENOUS; SUBCUTANEOUS at 06:24

## 2022-04-07 RX ADMIN — ALBUMIN (HUMAN) 500 ML: 12.5 INJECTION, SOLUTION INTRAVENOUS at 19:45

## 2022-04-07 RX ADMIN — HYDROMORPHONE HYDROCHLORIDE 0.5 MG: 1 INJECTION, SOLUTION INTRAMUSCULAR; INTRAVENOUS; SUBCUTANEOUS at 18:04

## 2022-04-07 RX ADMIN — MORPHINE SULFATE 2 MG: 2 INJECTION, SOLUTION INTRAMUSCULAR; INTRAVENOUS at 01:29

## 2022-04-07 RX ADMIN — Medication 10 MG: at 19:53

## 2022-04-07 RX ADMIN — ONDANSETRON 4 MG: 2 INJECTION INTRAMUSCULAR; INTRAVENOUS at 19:27

## 2022-04-07 RX ADMIN — ROCURONIUM BROMIDE 5 MG: 50 INJECTION, SOLUTION INTRAVENOUS at 19:22

## 2022-04-07 RX ADMIN — SODIUM CHLORIDE, PRESERVATIVE FREE 40 MG: 5 INJECTION INTRAVENOUS at 04:38

## 2022-04-07 RX ADMIN — SUGAMMADEX 100 MG: 100 INJECTION, SOLUTION INTRAVENOUS at 20:09

## 2022-04-07 RX ADMIN — Medication 4 UNITS: at 18:12

## 2022-04-07 RX ADMIN — PHENYLEPHRINE HYDROCHLORIDE 160 MCG: 10 INJECTION INTRAVENOUS at 19:29

## 2022-04-07 RX ADMIN — HYDROMORPHONE HYDROCHLORIDE 1 MG: 1 INJECTION, SOLUTION INTRAMUSCULAR; INTRAVENOUS; SUBCUTANEOUS at 16:00

## 2022-04-07 RX ADMIN — SODIUM CHLORIDE 150 ML/HR: 900 INJECTION, SOLUTION INTRAVENOUS at 06:31

## 2022-04-07 RX ADMIN — SODIUM CHLORIDE, PRESERVATIVE FREE 10 ML: 5 INJECTION INTRAVENOUS at 22:17

## 2022-04-07 RX ADMIN — HYDROMORPHONE HYDROCHLORIDE 0.2 MG: 1 INJECTION, SOLUTION INTRAMUSCULAR; INTRAVENOUS; SUBCUTANEOUS at 09:57

## 2022-04-07 RX ADMIN — SODIUM CHLORIDE 75 ML/HR: 900 INJECTION, SOLUTION INTRAVENOUS at 22:18

## 2022-04-07 RX ADMIN — ONDANSETRON 4 MG: 2 INJECTION INTRAMUSCULAR; INTRAVENOUS at 09:57

## 2022-04-07 RX ADMIN — SODIUM CHLORIDE, PRESERVATIVE FREE 10 ML: 5 INJECTION INTRAVENOUS at 01:11

## 2022-04-07 RX ADMIN — FENTANYL CITRATE 100 MCG: 50 INJECTION INTRAMUSCULAR; INTRAVENOUS at 19:22

## 2022-04-07 RX ADMIN — ONDANSETRON 4 MG: 2 INJECTION INTRAMUSCULAR; INTRAVENOUS at 04:35

## 2022-04-07 RX ADMIN — Medication 10 MG: at 19:57

## 2022-04-07 RX ADMIN — SODIUM CHLORIDE, PRESERVATIVE FREE 10 ML: 5 INJECTION INTRAVENOUS at 05:49

## 2022-04-07 RX ADMIN — Medication 10 MG: at 19:38

## 2022-04-07 RX ADMIN — Medication 6 UNITS: at 11:57

## 2022-04-07 RX ADMIN — CEFTRIAXONE 1 G: 1 INJECTION, POWDER, FOR SOLUTION INTRAMUSCULAR; INTRAVENOUS at 22:17

## 2022-04-07 RX ADMIN — ROCURONIUM BROMIDE 15 MG: 50 INJECTION, SOLUTION INTRAVENOUS at 19:31

## 2022-04-07 RX ADMIN — Medication 10 MG: at 19:28

## 2022-04-07 NOTE — ED PROVIDER NOTES
Patient presents emerge department with a complaint of abdominal pain and vomiting. The patient had a paracentesis performed at the interventional radiology department [place] earlier today for ascites. While driving home from dinner she became very nauseated and began vomiting and also now complains of generalized abdominal pain. She denies any fever or chills. She denies diarrhea and review of systems otherwise negative. She states only local anesthesia was used for the procedure. The history is provided by the patient and medical records. Vomiting   This is a new problem. The current episode started 1 to 2 hours ago. The problem has not changed since onset. The emesis has an appearance of stomach contents. There has been no fever. Associated symptoms include abdominal pain. Pertinent negatives include no chills, no fever, no sweats, no diarrhea and no arthralgias. Past Medical History:   Diagnosis Date    Cirrhosis ([address]     Diabetes ([address]        Past Surgical History:   Procedure Laterality Date    IR PARACENTESIS ABD W IMAGE  10/4/2021    IR PARACENTESIS ABD W IMAGE  10/28/2021    IR PARACENTESIS ABD W IMAGE  11/22/2021    IR PARACENTESIS ABD W IMAGE  1/31/2022    IR PARACENTESIS ABD W IMAGE  3/16/2022    IR PARACENTESIS ABD W IMAGE  3/25/2022    IR PARACENTESIS ABD W IMAGE  4/6/2022         History reviewed. No pertinent family history.     Social History     Socioeconomic History    Marital status:      Spouse name: Not on file    Number of children: Not on file    Years of education: Not on file    Highest education level: Not on file   Occupational History    Not on file   Tobacco Use    Smoking status: Never Smoker    Smokeless tobacco: Never Used   Substance and Sexual Activity    Alcohol use: Not Currently    Drug use: Never    Sexual activity: Not Currently     Partners: Male   Other Topics Concern     Service Not Asked    Blood Transfusions Not Asked  Caffeine Concern Not Asked    Occupational Exposure Not Asked    Hobby Hazards Not Asked    Sleep Concern Not Asked    Stress Concern Not Asked    Weight Concern Not Asked    Special Diet Not Asked    Back Care Not Asked    Exercise Not Asked    Bike Helmet Not Asked   [address] Not Asked    Self-Exams Not Asked   Social History Narrative    Not on file     Social Determinants of Health     Financial Resource Strain:     Difficulty of Paying Living Expenses: Not on file   Food Insecurity:     Worried About Running Out of Food in the Last Year: Not on file    [place] of Food in the Last Year: Not on file   Transportation Needs:     Lack of Transportation (Medical): Not on file    Lack of Transportation (Non-Medical): Not on file   Physical Activity:     Days of Exercise per Week: Not on file    Minutes of Exercise per Session: Not on file   Stress:     Feeling of Stress : Not on file   Social Connections:     Frequency of Communication with Friends and Family: Not on file    Frequency of Social Gatherings with Friends and Family: Not on file    Attends Latter day Services: Not on file    Active Member of [address] [place] [address] or Organizations: Not on file    Attends Club or Organization Meetings: Not on file    Marital Status: Not on file   Intimate Partner Violence:     Fear of Current or Ex-Partner: Not on file    Emotionally Abused: Not on file    Physically Abused: Not on file    Sexually Abused: Not on file   Housing Stability:     Unable to Pay for Housing in the Last Year: Not on file    Number of [place] in the Last Year: Not on file    Unstable Housing in the Last Year: Not on file         ALLERGIES: Patient has no known allergies. Review of Systems   Constitutional: Negative for chills and fever. Gastrointestinal: Positive for abdominal pain and vomiting. Negative for diarrhea. Musculoskeletal: Negative for arthralgias.    All other systems reviewed and are negative. Vitals:    04/06/22 2118   BP: (!) 210/98   Pulse: 88   Resp: 22   SpO2: 99%            Physical Exam  Vitals and nursing note reviewed. Constitutional:       General: She is not in acute distress. Appearance: Normal appearance. She is not ill-appearing, toxic-appearing or diaphoretic. HENT:      Head: Normocephalic and atraumatic. Mouth/Throat:      Mouth: Mucous membranes are moist.      Pharynx: Oropharynx is clear. Eyes:      Extraocular Movements: Extraocular movements intact. Conjunctiva/sclera: Conjunctivae normal.      Pupils: Pupils are equal, round, and reactive to light. Cardiovascular:      Rate and Rhythm: Normal rate and regular rhythm. Pulses: Normal pulses. Pulmonary:      Effort: Pulmonary effort is normal.      Breath sounds: Normal breath sounds. Abdominal:      General: There is distension. Palpations: Abdomen is soft. Tenderness: There is abdominal tenderness. There is no right CVA tenderness, left CVA tenderness or guarding. Hernia: A hernia is present. Comments: It appears that there is golf ball size herniation through the umbilicus with some bluish discoloration. Tender to palpation. Initial attempts at reduction of this hernia were unsuccessful. Musculoskeletal:      Cervical back: Normal range of motion and neck supple. Skin:     General: Skin is warm and dry. Capillary Refill: Capillary refill takes less than 2 seconds. Neurological:      General: No focal deficit present. Mental Status: She is alert and oriented to person, place, and time. Mental status is at baseline. Psychiatric:         Mood and Affect: Mood normal.         Behavior: Behavior normal.         Thought Content:  Thought content normal.          MDM  Number of Diagnoses or Management Options  Diagnosis management comments: Patient's symptoms may be related to ileus, post procedure, also possibility of incarcerated or strangulated umbilical hernia.        Amount and/or Complexity of Data Reviewed  Clinical lab tests: ordered and reviewed  Tests in the radiology section of CPT®: ordered and reviewed  Review and summarize past medical records: yes  Independent visualization of images, tracings, or specimens: yes    Risk of Complications, Morbidity, and/or Mortality  Presenting problems: moderate  Diagnostic procedures: moderate  Management options: moderate    Patient Progress  Patient progress: stable         Procedures

## 2022-04-07 NOTE — H&P
Hospitalist History and Physical   Admit Date:  2022  9:08 PM   Name:  Fawad Pichardo   Age:  66 y.o. Sex:  female  :  1950   MRN:  [id number]     Presenting Complaint: abdominal pain  Reason(s) for Admission: Incarcerated umbilical hernia [K33.9]  SBO (small bowel obstruction) ([address] [K56.609]  UTI (urinary tract infection) [N39.0]     History of Present Illness:   Fawad Pichardo is a 66 y.o. female with medical history of cirrhosis who presented to ED with abdominal pain. Patient underwent paracentesis today without issue. She had dinner tonight, and reports that during the drive home, she became ill with nausea and vomiting. Denies overt fevers/chills. Upon ER evaluation, patient found to have an incarcerated umbilical hernia. Unsuccessful bedside reduction x3.  CT A/P was obtained which shows SBO at level of umbilical hernia. UA is also indicative of infection. On-call General Surgery was consulted by ER due to incarcerated hernia. Case was reviewed by Dr. Macarena Chavez who defers admission to Hospitalist team due to comorbid conditions, however General Surgery will consult. Review of Systems:  10 systems reviewed and negative except as noted in HPI.   Assessment & Plan:   * SBO (small bowel obstruction) (HCC)  - SBO is at the level of the incarcerated umbilical hernia per CT scan  - NGT  - NPO  - General Surgery already consulted by ER  - PRN morphine for pain    Incarcerated umbilical hernia  - Apparent cause of acute SBO  - Attempt at reduction x3 unsuccessful  - On-call General Surgeon, Dr. Macarena Chavez, consulted by ER  - PRN pain medications  - Will not start anticoagulation for DVT prophylaxis at this time in consideration of need for possible surgery tomorrow    UTI (urinary tract infection)  - UA indicative of infection  - Rocephin    CARABALLO (nonalcoholic steatohepatitis)  - Underwent paracentesis this afternoon for ascites  - Holding home meds given SBO and NPO status    Type II diabetes mellitus ([facility]  - NPO and holding home meds  - SSI Q6H for hyperglycemia coverage       Disposition: inpatient    Diet: NPO  VTE ppx: SCDs  Code status: FULL      Past medical history reviewed. Past Medical History:   Diagnosis Date    Cirrhosis ([address]     Diabetes ([facility] [address]      Past surgical history reviewed. Past Surgical History:   Procedure Laterality Date    IR PARACENTESIS ABD W IMAGE  10/4/2021    IR PARACENTESIS ABD W IMAGE  10/28/2021    IR PARACENTESIS ABD W IMAGE  11/22/2021    IR PARACENTESIS ABD W IMAGE  1/31/2022    IR PARACENTESIS ABD W IMAGE  3/16/2022    IR PARACENTESIS ABD W IMAGE  3/25/2022    IR PARACENTESIS ABD W IMAGE  4/6/2022      No Known Allergies   Social History     Tobacco Use    Smoking status: Never Smoker    Smokeless tobacco: Never Used   Substance Use Topics    Alcohol use: Not Currently      History reviewed. No pertinent family history. Family history reviewed and noncontributory to patient's acute condition; no relevant family history unless otherwise noted above.   Immunization History   Administered Date(s) Administered    COVID-19, Moderna Booster, PF, 0.25mL Dose 10/25/2021    COVID-19, Moderna, Primary or Immunocompromised Series, MRNA, PF, 100mcg/0.5mL 01/21/2021, 03/01/2021    Hep A and Hep B Vaccine 02/02/2021    Influenza High Dose Vaccine PF 10/14/2013, 11/30/2016, 11/13/2017, 10/16/2018    Influenza Vaccine (Quadrivalent)(>18 Yrs Flublok [id number]) 11/01/2019    Influenza Vaccine (Whole Virus) 10/22/2012    Influenza, High-dose, Quadrivalent (>65 Yrs Fluzone High Dose Quad [id number]) 11/17/2021    Pneumococcal Conjugate (PCV-13) 11/30/2016    Pneumococcal Polysaccharide (PPSV-23) 07/07/2014    Tdap 07/07/2014    Zoster Vaccine, Live 10/22/2012     PTA Medications:  Current Outpatient Medications   Medication Instructions    acetaminophen (TYLENOL) 1,000 mg, Oral, EVERY 6 HOURS AS NEEDED    aspirin 81 mg, Oral, DAILY    bumetanide (BUMEX) 1 mg, Oral, DAILY, Takes 3 tablets daily     cholecalciferol (VITAMIN D3) 5,000 Units, Oral, DAILY    ferrous sulfate 325 mg, Oral, DAILY BEFORE BREAKFAST    FLUoxetine (PROZAC) 40 mg, Oral, DAILY, Takes in AM, pcp is trying to wean this Medication down     insulin glargine (LANTUS,BASAGLAR) 42 Units, SubCUTAneous, 2 TIMES DAILY    insulin lispro (HUMALOG) 20 Units, SubCUTAneous, 3 TIMES DAILY BEFORE MEALS    Insulin Needles, Disposable, (Comfort EZ Pen Needles) 32 gauge x 5/16\" ndle Use 4 times daily    lidocaine (LIDODERM) 5 % 1 Patch, TransDERmal, EVERY 24 HOURS, Apply patch to the affected area for 12 hours a day and remove for 12 hours a day.  magnesium oxide (MAG-OX) 400 mg, Oral, 2 TIMES DAILY    midodrine (PROAMATINE) 5 mg, Oral, 3 TIMES DAILY, Takes 3 tablets TID     mirabegron ER (MYRBETRIQ) 25 mg, Oral, DAILY    omega 3-dha-epa-fish oil 100-160-1,000 mg cap 1,000 mg, Oral    ondansetron (ZOFRAN ODT) 4 mg, Oral, EVERY 12 HOURS AS NEEDED    pantoprazole (PROTONIX) 40 mg, Oral, 2 TIMES DAILY    pravastatin (PRAVACHOL) 40 mg, Oral, EVERY BEDTIME    propranoloL (INDERAL) 20 mg, Oral, 2 TIMES DAILY    rifAXIMin (XIFAXAN) 550 mg, Oral, 2 TIMES DAILY    spironolactone (ALDACTONE) 100 mg, DAILY    VITAMIN A PO 2,400 mcg, Oral, DAILY    zinc sulfate 50 mg zinc (220 mg) tablet 1 Tablet, Oral, DAILY       Objective:     Patient Vitals for the past 24 hrs:   Pulse Resp BP SpO2   04/06/22 2259   133/62 97 %   04/06/22 2200   124/60 93 %   04/06/22 2130   (!) 141/75 99 %   04/06/22 2118 88 22 (!) 210/98 99 %     Oxygen Therapy  O2 Sat (%): 97 % (04/06/22 2259)  Pulse via Oximetry: 73 beats per minute (04/06/22 2259)    Estimated body mass index is 30.04 kg/m² as calculated from the following:    Height as of 3/25/22: 5' 4\" (1.626 m). Weight as of 3/25/22: 79.4 kg (175 lb). No intake or output data in the 24 hours ending 04/07/22 0003      Physical Exam:  General:    Well nourished.   No overt distress  Head:  Normocephalic, atraumatic  Eyes:  Sclerae appear normal.  Pupils equally round. HENT:  Nares appear normal, no drainage. Moist mucous membranes  Neck:  No restricted ROM. Trachea midline  CV:   RRR. S1/S2 auscultated  Lungs:   CTAB. No wheezing, rhonchi, or rales. Appears even, unlabored  Abdomen: Moderately distended. Bowel sounds present. Bluish-colored mass protruding at umbilicus. ~4cm in diameter. Painful to palpation. Unable to reduce through umbilicus. Extremities: Warm and dry. No cyanosis or clubbing. No edema. Skin:     No rashes. Normal turgor. Normal coloration  Neuro:  Cranial nerves II-XII grossly intact. Sensation intact  Psych:  Normal mood and affect. Alert and oriented x3    Data Ordered and Personally Reviewed:    Last 24hr Labs:  Recent Results (from the past 24 hour(s))   CBC WITH AUTOMATED DIFF    Collection Time: 04/06/22  9:40 PM   Result Value Ref Range    WBC 5.6 4.3 - 11.1 K/uL    RBC 4.25 4.05 - 5.2 M/uL    HGB 12.2 11.7 - 15.4 g/dL    HCT 35.9 35.8 - 46.3 %    MCV 84.5 79.6 - 97.8 FL    MCH 28.7 26.1 - 32.9 PG    MCHC 34.0 31.4 - 35.0 g/dL    RDW 14.8 (H) 11.9 - 14.6 %    PLATELET 693 893 - 061 K/uL    MPV 11.6 9.4 - 12.3 FL    ABSOLUTE NRBC 0.00 0.0 - 0.2 K/uL    DF AUTOMATED      NEUTROPHILS 75 43 - 78 %    LYMPHOCYTES 11 (L) 13 - 44 %    MONOCYTES 10 4.0 - 12.0 %    EOSINOPHILS 3 0.5 - 7.8 %    BASOPHILS 1 0.0 - 2.0 %    IMMATURE GRANULOCYTES 1 0.0 - 5.0 %    ABS. NEUTROPHILS 4.2 1.7 - 8.2 K/UL    ABS. LYMPHOCYTES 0.6 0.5 - 4.6 K/UL    ABS. MONOCYTES 0.6 0.1 - 1.3 K/UL    ABS. EOSINOPHILS 0.1 0.0 - 0.8 K/UL    ABS. BASOPHILS 0.0 0.0 - 0.2 K/UL    ABS. IMM.  GRANS. 0.1 0.0 - 0.5 K/UL   METABOLIC PANEL, COMPREHENSIVE    Collection Time: 04/06/22  9:40 PM   Result Value Ref Range    Sodium 136 136 - 145 mmol/L    Potassium 4.0 3.5 - 5.1 mmol/L    Chloride 104 98 - 107 mmol/L    CO2 22 21 - 32 mmol/L    Anion gap 10 7 - 16 mmol/L    Glucose 281 (H) 65 - 100 mg/dL    BUN 15 8 - 23 MG/DL    Creatinine 0.95 0.6 - 1.0 MG/DL    GFR est AA >60 >60 ml/min/1.73m2    GFR est non-AA >60 >60 ml/min/1.73m2    Calcium 8.5 8.3 - 10.4 MG/DL    Bilirubin, total 1.5 (H) 0.2 - 1.1 MG/DL    ALT (SGPT) 33 12 - 65 U/L    AST (SGOT) 60 (H) 15 - 37 U/L    Alk. phosphatase 227 (H) 50 - 130 U/L    Protein, total 7.3 6.3 - 8.2 g/dL    Albumin 2.6 (L) 3.2 - 4.6 g/dL    Globulin 4.7 (H) 2.3 - 3.5 g/dL    A-G Ratio 0.6 (L) 1.2 - 3.5     LACTIC ACID    Collection Time: 04/06/22  9:40 PM   Result Value Ref Range    Lactic acid 2.1 (H) 0.4 - 2.0 MMOL/L   URINE MICROSCOPIC    Collection Time: 04/06/22  9:46 PM   Result Value Ref Range    WBC >100 0 /hpf    RBC 5-10 0 /hpf    Epithelial cells 10-20 0 /hpf    Bacteria 4+ (H) 0 /hpf    Casts 0 0 /lpf    Crystals, urine 0 0 /LPF    Mucus 0 0 /lpf    Other observations Microscopic performed on unspun urine. QNS to spin. All Micro Results     Procedure Component Value Units Date/Time    CULTURE, URINE [[id number]] Collected: 04/06/22 2146    Order Status: Completed Specimen: Urine from Clean catch Updated: 04/06/22 2300          Other Studies:  CT ABD PELV W CONT    Result Date: 4/6/2022  EXAM: CT abdomen and pelvis with IV contrast. INDICATION: Abdominal pain, nausea and vomiting. COMPARISON: None. TECHNIQUE: Axial CT images of the abdomen and pelvis were obtained after the intravenous injection of 100 mL Isovue 370 CT contrast. Radiation dose reduction techniques were used for this study. Our CT scanners use one or all of the following:  Automated exposure control, adjustment of the mA or kV according to patient size, iterative reconstruction. FINDINGS: - Liver: The liver has a somewhat lobulated contour, suspect for underlying cirrhosis. No focal liver mass is seen. - Gallbladder and bile ducts: Postcholecystectomy. The common bile duct is mildly dilated for a postcholecystectomy patient, measuring 1.4 cm in diameter. - Spleen:  The spleen is enlarged, measuring 16 cm in length, and contains a few scattered calcified granulomas. - Urinary tract: Within normal limits. - Adrenals: Within normal limits. - Pancreas: Within normal limits. - Gastrointestinal tract: There are multiple distended and fluid-filled small bowel loops in the abdomen entering up to 3.3 cm in diameter. Decompressed loops are seen in the right abdomen, suggesting a small bowel obstruction. The site of obstruction appears to be at the level of a small umbilical hernia which contains a short loop of small bowel and a small amount of fluid. The colon is predominantly collapsed, limiting evaluation. The appendix is not clearly visualized. - Retroperitoneum: Mild abdominal aortic atherosclerosis, with no aneurysmal dilatation or dissection. - Peritoneal cavity and abdominal wall: There is mild ascites. No focal abscess or free intraperitoneal air is identified. Also noted are upper abdominal and paraesophageal varicosities. - Pelvis: Within normal limits. - Spine/bones: No acute process. There are subacute/healing lower left rib fractures. - Other comments: The lung bases are clear. 1. Small bowel obstruction, at the level of an umbilical hernia containing a short loop of small bowel. 2. Cirrhotic liver, with upper abdominal/paraesophageal varicosities, splenomegaly and mild ascites suggesting portal hypertension. The portal vein appears patent. 3. Dilated 1.4 cm common bile duct, in this postcholecystectomy patient, of unclear etiology. IR PARACENTESIS ABD W IMAGE    Result Date: 4/6/2022  Title: Ultrasound guided paracentesis. History: 43-year-old female with Velta Maribell, ascites. :  Marion Allen PA-C Supervising Physician: Raghav Diehl M.D. Consent:  Informed written and oral consent was obtained from the patient after the risks and benefits were discussed. All questions were answered and the patient requested that we proceed.  Procedure:  Maximal sterile barrier technique was used. Following routine prep and drape of the abdomen, a local field block with 1% lidocaine was achieved. Ultrasound evaluation was performed. Fluid was identified in the peritoneal cavity. Using real-time ultrasound guidance, the peritoneal cavity was accessed with an 8 Tamazight centesis catheter. The catheter was connected to wall suction. A total of 8650 cc of thin yellow fluid was removed. The catheter was removed and a bandage applied. Complications: None. Findings: Large-volume ascites. Uncomplicated ultrasound guided paracentesis.          Medications Administered     0.9% sodium chloride infusion     Admin Date  04/06/2022 Action  New Bag Dose  150 mL/hr Rate  150 mL/hr Route  IntraVENous Administered By  Nazanin Killian RN          cefTRIAXone (ROCEPHIN) 1 g in 0.9% sodium chloride (MBP/ADV) 50 mL MBP     Admin Date  04/06/2022 Action  New Bag Dose  1 g Rate  100 mL/hr Route  IntraVENous Administered By  Nazanin Killian RN          iopamidoL (ISOVUE-370) 76 % injection 100 mL     Admin Date  04/06/2022 Action  Given Dose  100 mL Route  IntraVENous Administered By  RT Jonnie, R          morphine injection 4 mg     Admin Date  04/06/2022 Action  Given Dose  4 mg Route  IntraVENous Administered By  Nazanin Killian RN          [facility]etron [facility] injection 4 mg     Admin Date  04/06/2022 Action  Given Dose  4 mg Route  IntraVENous Administered By  Nazanin Killian RN          saline peripheral flush soln 10 mL     Admin Date  04/06/2022 Action  Given Dose  10 mL Route  InterCATHeter Administered By  RT Jonnie, R          sodium chloride 0.9 % bolus infusion 100 mL     Admin Date  04/06/2022 Action  New Bag Dose  100 mL Route  IntraVENous Administered By  RT Jonnie, R                  Signed:  Anjelica Rizvi MD

## 2022-04-07 NOTE — PERIOP NOTES
TRANSFER - IN REPORT:    Verbal report received from [facility] on Raghav Spruce  being received from 3rd floor  for routine progression of care      Report consisted of patients Situation, Background, Assessment and   Recommendations(SBAR). Information from the following report(s) Kardex was reviewed with the receiving nurse. Opportunity for questions and clarification was provided. Assessment completed upon patients arrival to unit and care assumed.

## 2022-04-07 NOTE — ASSESSMENT & PLAN NOTE
- Apparent cause of acute SBO  - Attempt at reduction x3 unsuccessful  - On-call General Surgeon, Dr. Tamara Fiore, consulted by ER  - PRN pain medications  - Will not start anticoagulation for DVT prophylaxis at this time in consideration of need for possible surgery tomorrow

## 2022-04-07 NOTE — ASSESSMENT & PLAN NOTE
- SBO is at the level of the incarcerated umbilical hernia per CT scan  - NGT  - NPO  - General Surgery already consulted by ER  - PRN morphine for pain

## 2022-04-07 NOTE — PROGRESS NOTES
Patient NGT on low intermittent suction. Gastric contents are bloody with small clots.  Dr. Ana Nicholson notified and states she is notifying general surgery

## 2022-04-07 NOTE — PROGRESS NOTES
Patient accidentally removed NG tube while standing up to use bedside commode. MD notified. Will attempt to insert new NG tube.

## 2022-04-07 NOTE — PROGRESS NOTES
Problem: Diabetes Self-Management  Goal: *Disease process and treatment process  Description: Define diabetes and identify own type of diabetes; list 3 options for treating diabetes. Outcome: Progressing Towards Goal  Goal: *Incorporating nutritional management into lifestyle  Description: Describe effect of type, amount and timing of food on blood glucose; list 3 methods for planning meals. Outcome: Progressing Towards Goal  Goal: *Incorporating physical activity into lifestyle  Description: State effect of exercise on blood glucose levels. Outcome: Progressing Towards Goal  Goal: *Developing strategies to promote health/change behavior  Description: Define the ABC's of diabetes; identify appropriate screenings, schedule and personal plan for screenings. Outcome: Progressing Towards Goal  Goal: *Using medications safely  Description: State effect of diabetes medications on diabetes; name diabetes medication taking, action and side effects. Outcome: Progressing Towards Goal  Goal: *Monitoring blood glucose, interpreting and using results  Description: Identify recommended blood glucose targets  and personal targets. Outcome: Progressing Towards Goal  Goal: *Prevention, detection, treatment of acute complications  Description: List symptoms of hyper- and hypoglycemia; describe how to treat low blood sugar and actions for lowering  high blood glucose level. Outcome: Progressing Towards Goal  Goal: *Prevention, detection and treatment of chronic complications  Description: Define the natural course of diabetes and describe the relationship of blood glucose levels to long term complications of diabetes.   Outcome: Progressing Towards Goal  Goal: *Developing strategies to address psychosocial issues  Description: Describe feelings about living with diabetes; identify support needed and support network  Outcome: Progressing Towards Goal  Goal: *Insulin pump training  Outcome: Progressing Towards Goal  Goal: *Sick day guidelines  Outcome: Progressing Towards Goal  Goal: *Patient Specific Goal (EDIT GOAL, INSERT TEXT)  Outcome: Progressing Towards Goal     Problem: Patient Education: Go to Patient Education Activity  Goal: Patient/Family Education  Outcome: Progressing Towards Goal     Problem: Falls - Risk of  Goal: *Absence of Falls  Description: Document Denise Sullivan Fall Risk and appropriate interventions in the flowsheet.   Outcome: Progressing Towards Goal  Note: Fall Risk Interventions:            Medication Interventions: Patient to call before getting OOB                   Problem: Patient Education: Go to Patient Education Activity  Goal: Patient/Family Education  Outcome: Progressing Towards Goal

## 2022-04-07 NOTE — ANESTHESIA PREPROCEDURE EVALUATION
Relevant Problems   RESPIRATORY SYSTEM   (+) RIA (obstructive sleep apnea)      NEUROLOGY   (+) Chronic migraine without aura with status migrainosus, not intractable      CARDIOVASCULAR   (+) Atherosclerosis of coronary artery   (+) CAD (coronary artery disease)   (+) Presence of drug-eluting stent in left circumflex coronary artery      GASTROINTESTINAL   (+) Hiatal hernia   (+) Liver 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 (nonalcoholic steatohepatitis) (HCC)      ENDOCRINE   (+) Impingement syndrome of right shoulder   (+) Type II diabetes mellitus (HCC)      HEMATOLOGY   (+) Anemia due to blood loss       Anesthetic History               Review of Systems / Medical History  Patient summary reviewed, nursing notes reviewed and pertinent labs reviewed    Pulmonary                   Neuro/Psych              Cardiovascular    Hypertension          CAD and cardiac stents    Exercise tolerance: >4 METS     GI/Hepatic/Renal           Liver disease     Endo/Other    Diabetes: using insulin         Other Findings            Physical Exam    Airway    TM Distance: 4 - 6 cm  Neck ROM: normal range of motion   Mouth opening: Normal     Cardiovascular  Regular rate and rhythm,  S1 and S2 normal,  no murmur, click, rub, or gallop             Dental  No notable dental hx       Pulmonary  Breath sounds clear to auscultation               Abdominal         Other Findings            Anesthetic Plan    ASA: 4  Anesthesia type: general          Induction: Intravenous  Anesthetic plan and risks discussed with: Patient      Post ascites drainage with incarcerated umbilical hernia

## 2022-04-07 NOTE — ED TRIAGE NOTES
Pt was seen at IR DT for a paracentesis. Ate dinner on the way home and became very ill on the way home with nausea and vomiting.   Masked on arrival

## 2022-04-07 NOTE — CONSULTS
Surgery Consult      Patient: Abigail Osorio 66 y.o. female     Consulting Physician:  ER    Chief Complaint: incarcerated hernia, SBO    Subjective:      Abigail Osorio is a 66 y.o. female who surgery has been asked to see for evaluation of incarceration of an abdominal hernia. This is described as acute . Onset was 1 day ago. Symptoms have been gradually worsening since. Aggravating factors: s/p therapeutic paracentesis earlier yesterday. Alleviating factors: none. Associated symptoms: nausea, pain. The patient denies fever, chills, melena, hematochezia. She has known of hernia at umbilicus for quite some time but has never had symptoms. She reports therapeutic paracentesis is being done on a prn basis, and seems to be \"every few weeks. \"    Past Medical History:   Diagnosis Date    Cirrhosis ([address]     Diabetes ([address]        Past Surgical History:   Procedure Laterality Date    IR PARACENTESIS ABD W IMAGE  10/4/2021    IR PARACENTESIS ABD W IMAGE  10/28/2021    IR PARACENTESIS ABD W IMAGE  11/22/2021    IR PARACENTESIS ABD W IMAGE  1/31/2022    IR PARACENTESIS ABD W IMAGE  3/16/2022    IR PARACENTESIS ABD W IMAGE  3/25/2022    IR PARACENTESIS ABD W IMAGE  4/6/2022        History reviewed. No pertinent family history.     Social History     Socioeconomic History    Marital status:    Tobacco Use    Smoking status: Never Smoker    Smokeless tobacco: Never Used   Substance and Sexual Activity    Alcohol use: Not Currently    Drug use: Never    Sexual activity: Not Currently     Partners: Male        Current Facility-Administered Medications   Medication Dose Route Frequency    insulin lispro (HUMALOG) injection   SubCUTAneous Q6H    sodium chloride (NS) flush 5-40 mL  5-40 mL IntraVENous Q8H    sodium chloride (NS) flush 5-40 mL  5-40 mL IntraVENous PRN    acetaminophen (TYLENOL) tablet 650 mg  650 mg Oral Q6H PRN    Or    acetaminophen (TYLENOL) suppository 650 mg  650 mg Rectal Q6H PRN  polyethylene glycol (MIRALAX) packet 17 g  17 g Oral DAILY PRN    ondansetron (ZOFRAN ODT) tablet 4 mg  4 mg Oral Q8H PRN    Or    ondansetron (ZOFRAN) injection 4 mg  4 mg IntraVENous Q6H PRN    cefTRIAXone (ROCEPHIN) 1 g in 0.9% sodium chloride (MBP/ADV) 50 mL MBP  1 g IntraVENous Q24H    pantoprazole (PROTONIX) 40 mg in 0.9% sodium chloride 10 mL injection  40 mg IntraVENous DAILY    HYDROmorphone (DILAUDID) injection 0.2 mg  0.2 mg IntraVENous Q4H PRN    0.9% sodium chloride infusion  150 mL/hr IntraVENous CONTINUOUS     Facility-Administered Medications Ordered in Other Encounters   Medication Dose Route Frequency    lidocaine (XYLOCAINE) 20 mg/mL (2 %) injection  mg  1-10 mL IntraDERMal Multiple        No Known Allergies    Review of Systems:  Pertinent items are noted in the History of Present Illness. Objective:        Patient Vitals for the past 8 hrs:   BP Temp Pulse Resp SpO2   22 1146 (!) 141/72 98.2 °F (36.8 °C) 81 12 96 %   22 0729 (!) 159/73 97.7 °F (36.5 °C) 79 16 95 %       Temp (24hrs), Av.1 °F (36.7 °C), Min:97.7 °F (36.5 °C), Max:98.3 °F (36.8 °C)      Physical Exam:  GENERAL: alert, cooperative, mild distress, appears stated age, EYE: negative findings: anicteric sclera, LUNG: clear to auscultation bilaterally, HEART: regular rate and rhythm, ABDOMEN: obese and protuberant but soft, no fluid wave. Scars c/w prior lap choly and gynecologic surgery.   Tender and irreducible (despite prolonged attempts) 4cm mass at umbilicus with purplish underlying hue, no cellulitic changes, EXTREMITIES:  extremities normal, atraumatic, no cyanosis or edema, SKIN: Normal., NEUROLOGIC: negative findings: alert, oriented x3  speech normal in context and clarity  memory intact grossly  cranial nerves II-XII intact, PSYCHIATRIC: non focal      Labs:   Recent Results (from the past 24 hour(s))   CBC WITH AUTOMATED DIFF    Collection Time: 22  9:40 PM   Result Value Ref Range WBC 5.6 4.3 - 11.1 K/uL    RBC 4.25 4.05 - 5.2 M/uL    HGB 12.2 11.7 - 15.4 g/dL    HCT 35.9 35.8 - 46.3 %    MCV 84.5 79.6 - 97.8 FL    MCH 28.7 26.1 - 32.9 PG    MCHC 34.0 31.4 - 35.0 g/dL    RDW 14.8 (H) 11.9 - 14.6 %    PLATELET 410 840 - 135 K/uL    MPV 11.6 9.4 - 12.3 FL    ABSOLUTE NRBC 0.00 0.0 - 0.2 K/uL    DF AUTOMATED      NEUTROPHILS 75 43 - 78 %    LYMPHOCYTES 11 (L) 13 - 44 %    MONOCYTES 10 4.0 - 12.0 %    EOSINOPHILS 3 0.5 - 7.8 %    BASOPHILS 1 0.0 - 2.0 %    IMMATURE GRANULOCYTES 1 0.0 - 5.0 %    ABS. NEUTROPHILS 4.2 1.7 - 8.2 K/UL    ABS. LYMPHOCYTES 0.6 0.5 - 4.6 K/UL    ABS. MONOCYTES 0.6 0.1 - 1.3 K/UL    ABS. EOSINOPHILS 0.1 0.0 - 0.8 K/UL    ABS. BASOPHILS 0.0 0.0 - 0.2 K/UL    ABS. IMM. GRANS. 0.1 0.0 - 0.5 K/UL   METABOLIC PANEL, COMPREHENSIVE    Collection Time: 04/06/22  9:40 PM   Result Value Ref Range    Sodium 136 136 - 145 mmol/L    Potassium 4.0 3.5 - 5.1 mmol/L    Chloride 104 98 - 107 mmol/L    CO2 22 21 - 32 mmol/L    Anion gap 10 7 - 16 mmol/L    Glucose 281 (H) 65 - 100 mg/dL    BUN 15 8 - 23 MG/DL    Creatinine 0.95 0.6 - 1.0 MG/DL    GFR est AA >60 >60 ml/min/1.73m2    GFR est non-AA >60 >60 ml/min/1.73m2    Calcium 8.5 8.3 - 10.4 MG/DL    Bilirubin, total 1.5 (H) 0.2 - 1.1 MG/DL    ALT (SGPT) 33 12 - 65 U/L    AST (SGOT) 60 (H) 15 - 37 U/L    Alk. phosphatase 227 (H) 50 - 130 U/L    Protein, total 7.3 6.3 - 8.2 g/dL    Albumin 2.6 (L) 3.2 - 4.6 g/dL    Globulin 4.7 (H) 2.3 - 3.5 g/dL    A-G Ratio 0.6 (L) 1.2 - 3.5     LACTIC ACID    Collection Time: 04/06/22  9:40 PM   Result Value Ref Range    Lactic acid 2.1 (H) 0.4 - 2.0 MMOL/L   URINE MICROSCOPIC    Collection Time: 04/06/22  9:46 PM   Result Value Ref Range    WBC >100 0 /hpf    RBC 5-10 0 /hpf    Epithelial cells 10-20 0 /hpf    Bacteria 4+ (H) 0 /hpf    Casts 0 0 /lpf    Crystals, urine 0 0 /LPF    Mucus 0 0 /lpf    Other observations Microscopic performed on unspun urine. QNS to spin.      CULTURE, URINE Collection Time: 04/06/22  9:46 PM    Specimen: Clean catch; Urine   Result Value Ref Range    Special Requests: NO SPECIAL REQUESTS      Culture result:        NO GROWTH AFTER SHORT PERIOD OF INCUBATION. FURTHER RESULTS TO FOLLOW AFTER OVERNIGHT INCUBATION. GLUCOSE, POC    Collection Time: 04/07/22  1:09 AM   Result Value Ref Range    Glucose (POC) 302 (H) 65 - 100 mg/dL    Performed by [facility]    METABOLIC PANEL, COMPREHENSIVE    Collection Time: 04/07/22  2:04 AM   Result Value Ref Range    Sodium 137 136 - 145 mmol/L    Potassium 4.1 3.5 - 5.1 mmol/L    Chloride 104 98 - 107 mmol/L    CO2 27 21 - 32 mmol/L    Anion gap 6 (L) 7 - 16 mmol/L    Glucose 312 (H) 65 - 100 mg/dL    BUN 14 8 - 23 MG/DL    Creatinine 0.81 0.6 - 1.0 MG/DL    GFR est AA >60 >60 ml/min/1.73m2    GFR est non-AA >60 >60 ml/min/1.73m2    Calcium 8.3 8.3 - 10.4 MG/DL    Bilirubin, total 1.2 (H) 0.2 - 1.1 MG/DL    ALT (SGPT) 31 12 - 65 U/L    AST (SGOT) 35 15 - 37 U/L    Alk. phosphatase 187 (H) 50 - 136 U/L    Protein, total 6.8 6.3 - 8.2 g/dL    Albumin 2.3 (L) 3.2 - 4.6 g/dL    Globulin 4.5 (H) 2.3 - 3.5 g/dL    A-G Ratio 0.5 (L) 1.2 - 3.5     CBC WITH AUTOMATED DIFF    Collection Time: 04/07/22  2:04 AM   Result Value Ref Range    WBC 5.1 4.3 - 11.1 K/uL    RBC 4.03 (L) 4.05 - 5.2 M/uL    HGB 11.6 (L) 11.7 - 15.4 g/dL    HCT 35.1 (L) 35.8 - 46.3 %    MCV 87.1 79.6 - 97.8 FL    MCH 28.8 26.1 - 32.9 PG    MCHC 33.0 31.4 - 35.0 g/dL    RDW 14.7 (H) 11.9 - 14.6 %    PLATELET 069 150 - 909 K/uL    MPV 10.3 9.4 - 12.3 FL    ABSOLUTE NRBC 0.00 0.0 - 0.2 K/uL    DF AUTOMATED      NEUTROPHILS 73 43 - 78 %    LYMPHOCYTES 12 (L) 13 - 44 %    MONOCYTES 12 4.0 - 12.0 %    EOSINOPHILS 1 0.5 - 7.8 %    BASOPHILS 1 0.0 - 2.0 %    IMMATURE GRANULOCYTES 1 0.0 - 5.0 %    ABS. NEUTROPHILS 3.8 1.7 - 8.2 K/UL    ABS. LYMPHOCYTES 0.6 0.5 - 4.6 K/UL    ABS. MONOCYTES 0.6 0.1 - 1.3 K/UL    ABS. EOSINOPHILS 0.1 0.0 - 0.8 K/UL    ABS.  BASOPHILS 0.0 0.0 - 0.2 K/UL    ABS. IMM. GRANS. 0.1 0.0 - 0.5 K/UL   LACTIC ACID    Collection Time: 04/07/22  2:04 AM   Result Value Ref Range    Lactic acid 1.3 0.4 - 2.0 MMOL/L   GLUCOSE, POC    Collection Time: 04/07/22  5:36 AM   Result Value Ref Range    Glucose (POC) 276 (H) 65 - 100 mg/dL    Performed by [facility]    GLUCOSE, POC    Collection Time: 04/07/22 11:39 AM   Result Value Ref Range    Glucose (POC) 275 (H) 65 - 100 mg/dL    Performed by [facility]        Data Review reviewed  CT     CT Results (most recent):  Results from [place] encounter on 04/06/22    CT ABD PELV W CONT    Narrative  EXAM: CT abdomen and pelvis with IV contrast.    INDICATION: Abdominal pain, nausea and vomiting. COMPARISON: None. TECHNIQUE: Axial CT images of the abdomen and pelvis were obtained after the  intravenous injection of 100 mL Isovue 370 CT contrast. Radiation dose reduction  techniques were used for this study. Our CT scanners use one or all of the  following:  Automated exposure control, adjustment of the mA or kV according to  patient size, iterative reconstruction. FINDINGS:    - Liver: The liver has a somewhat lobulated contour, suspect for underlying  cirrhosis. No focal liver mass is seen. - Gallbladder and bile ducts: Postcholecystectomy. The common bile duct is  mildly dilated for a postcholecystectomy patient, measuring 1.4 cm in diameter. - Spleen: The spleen is enlarged, measuring 16 cm in length, and contains a few  scattered calcified granulomas. - Urinary tract: Within normal limits. - Adrenals: Within normal limits. - Pancreas: Within normal limits. - Gastrointestinal tract: There are multiple distended and fluid-filled small  bowel loops in the abdomen entering up to 3.3 cm in diameter. Decompressed loops  are seen in the right abdomen, suggesting a small bowel obstruction.  The site of  obstruction appears to be at the level of a small umbilical hernia which  contains a short loop of small bowel and a small amount of fluid. The colon is  predominantly collapsed, limiting evaluation. The appendix is not clearly  visualized. - Retroperitoneum: Mild abdominal aortic atherosclerosis, with no aneurysmal  dilatation or dissection.  - Peritoneal cavity and abdominal wall: There is mild ascites. No focal abscess  or free intraperitoneal air is identified. Also noted are upper abdominal and  paraesophageal varicosities. - Pelvis: Within normal limits. - Spine/bones: No acute process. There are subacute/healing lower left rib  fractures. - Other comments: The lung bases are clear. Impression  1. Small bowel obstruction, at the level of an umbilical hernia containing a  short loop of small bowel. 2. Cirrhotic liver, with upper abdominal/paraesophageal varicosities,  splenomegaly and mild ascites suggesting portal hypertension. The portal vein  appears patent. 3. Dilated 1.4 cm common bile duct, in this postcholecystectomy patient, of  unclear etiology. Assessment:     1. Incarcerated incisional hernia  2. SBO  3. Cirrhosis with ascites    Plan: Will proceed with incarcerated incisional hernia repair with mesh today; possibility of bowel resection discussed. Technical details of the procedure are reviewed. Risks reviewed include risks of anesthesia, bleeding, infection, visceral injury, persistent post-operative pain, and hernia recurrence. All questions are answered. She is thoroughly cautioned about the probability of wound complications due to ascites leakage.     Signed By: Tin Chatterjee MD     April 7, 2022

## 2022-04-07 NOTE — ED NOTES
TRANSFER - OUT REPORT:    Verbal report given to Ramesh Elizalde RN on Franko Sadler  being transferred to [address] [place] [address] for routine progression of care       Report consisted of patients Situation, Background, Assessment and   Recommendations(SBAR). Information from the following report(s) ED Summary was reviewed with the receiving nurse. Lines:   Peripheral IV 04/06/22 Left Antecubital (Active)   Site Assessment Clean, dry, & intact 04/06/22 2114   Phlebitis Assessment 0 04/06/22 2114   Dressing Status Clean, dry, & intact 04/06/22 2114        Opportunity for questions and clarification was provided.       Patient transported with:   Registered Nurse

## 2022-04-07 NOTE — PROGRESS NOTES
Hospitalist Progress Note   Admit Date:  2022  9:08 PM   LOS: 1 day   Name:  Ino Murrieta   Age:  66 y.o. Sex:  female  :  1950   MRN:  [id number]     Chief Complaint: Abdominal pain with N/V  Reason(s) for Admission: Incarcerated umbilical hernia [G19.7]  SBO (small bowel obstruction) ([facility] [K56.609]  UTI (urinary tract infection) [N39.0]     Hospital Course & Interval History:   66 y.o. female with a PMH of CARABALLO cirrhosis who presented to ED with abdominal pain after undergoing paracentesis. On , she got the paracentesis, had dinner and then she became ill with nausea and vomiting on the drive home. Upon ER evaluation, patient found to have an incarcerated umbilical hernia. Unsuccessful bedside reduction x3.  CT A/P was obtained which shows SBO at level of umbilical hernia. UA is also indicative of infection. On-call General Surgery was consulted by ER due to incarcerated hernia. She was admitted and NGT was placed. Subjective (22): Still having 7/10 abdominal pain despite NGT placement. Nausea improved. Denies vomiting since admission. Denies CP/SOB. Denies F/C. Review of systems negative except stated above.     Assessment & Plan:     Principal Problem:    Incarcerated umbilical hernia ()  -  CTAP with incarcerated ventral hernia with SBO  - Unable to be reduced after multiple attempts  - NGT in place to LIS  - Pain control  - Nausea control  - Gen Surg to take to OR this afternoon    Active Problems:    SBO (small bowel obstruction) ([facility] (2022)  - Due to #1  - NGT in place to LIS  - Pain control  - Nausea control  - Gen Surg to take to OR this afternoon      UTI (urinary tract infection) (2020)  - UA consistent with UTI  - Continue Ceftriaxone  - Await final urine culture results      Intractable nausea and vomiting (2022)  - Due to #1 & #2  - NGT in place  - Zofran PRN      Type II diabetes mellitus ([facility] (2013)  - A1C 8.0  - Glucose elevated today  - Add Lantus tonight  - Continue Humalog SSI      Liver 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 (nonalcoholic steatohepatitis) (HCC) (4/26/2021)  - S/P paracentesis on 4/6  - Continue to monitor      Esophageal varices determined by endoscopy ([facility] (12/8/2021)      Hiatal hernia (12/8/2021)      CAD (coronary artery disease) (4/7/2022)      Dyslipidemia (6/30/2020)        RIA (obstructive sleep apnea) (8/9/2012)    Diet:  DIET NPO  DVT PPx: SCDs  Code status: Full Code    Hospital Problems as of 4/7/2022 Date Reviewed: 3/21/2022          Codes Class Noted - Resolved POA    * (Principal) Incarcerated umbilical hernia [id number]: K42.0  ICD-9-CM: 552.1  4/6/2022 - Present Yes        SBO (small bowel obstruction) ([facility] ICD-10-CM: G72.642  ICD-9-CM: 560.9  4/6/2022 - Present Yes        Intractable nausea and vomiting ICD-10-CM: R11.2  ICD-9-CM: 536.2  4/7/2022 - Present Yes        UTI (urinary tract infection) ICD-10-CM: N39.0  ICD-9-CM: 599.0  9/29/2020 - Present Yes        Liver 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 (nonalcoholic steatohepatitis) ([facility] ICD-10-CM: K75.81, K74.60  ICD-9-CM: 571.8, 571.5  4/26/2021 - Present Unknown        Type II diabetes mellitus ([facility] ICD-10-CM: E11.9  ICD-9-CM: 250.00  11/19/2013 - Present Yes        CAD (coronary artery disease) ICD-10-CM: I25.10  ICD-9-CM: 414.00  4/7/2022 - Present Yes        Esophageal varices determined by endoscopy ([facility] ICD-10-CM: I85.00  ICD-9-CM: 456.1  12/8/2021 - Present Yes        Hiatal hernia ICD-10-CM: K44.9  ICD-9-CM: 553.3  12/8/2021 - Present Yes        Dyslipidemia ICD-10-CM: E78.5  ICD-9-CM: 272.4  6/30/2020 - Present Yes        RIA (obstructive sleep apnea) ICD-10-CM: G47.33  ICD-9-CM: 327.23  8/9/2012 - Present Yes              Objective:     Patient Vitals for the past 24 hrs:   Temp Pulse Resp BP SpO2   04/07/22 0729 97.7 °F (36.5 °C) 79 16 (!) 159/73 95 %   04/07/22 0449  76  135/64    04/07/22 0058 98.3 °F (36.8 °C) 75 18 (!) 119/53 96 % 04/07/22 0000    (!) 149/65 93 %   04/06/22 2259    133/62 97 %   04/06/22 2200    124/60 93 %   04/06/22 2130    (!) 141/75 99 %   04/06/22 2118  88 22 (!) 210/98 99 %     Oxygen Therapy  O2 Sat (%): 95 % (04/07/22 0729)  Pulse via Oximetry: 70 beats per minute (04/07/22 0000)  O2 Device: None (Room air) (04/07/22 0734)    Estimated body mass index is 30.04 kg/m² as calculated from the following:    Height as of 3/25/22: 5' 4\" (1.626 m). Weight as of 3/25/22: 79.4 kg (175 lb). No intake or output data in the 24 hours ending 04/07/22 1043      Physical Exam:   General:    Well nourished. No overt distress  Head:  Normocephalic, atraumatic  Eyes:  Sclerae appear normal.  Pupils equally round. ENT:  Nares appear normal, no drainage. Moist oral mucosa  Neck:  No restricted ROM. Trachea midline   CV:   RRR. No m/r/g. No jugular venous distension. Lungs:   CTAB. No wheezing, rhonchi, or rales. Respirations even, unlabored  Abdomen: Bowel sounds present. Soft, nontender, nondistended. Extremities: No cyanosis or clubbing. No edema  Skin:     No rashes and normal coloration. Warm and dry. Neuro: Cranial nerves II-XII grossly intact. Sensation intact  Psych: Normal mood and affect.   Alert and oriented x3    I have reviewed ordered lab tests and independently visualized imaging below:    Last 24hr Labs:  Recent Results (from the past 24 hour(s))   CBC WITH AUTOMATED DIFF    Collection Time: 04/06/22  9:40 PM   Result Value Ref Range    WBC 5.6 4.3 - 11.1 K/uL    RBC 4.25 4.05 - 5.2 M/uL    HGB 12.2 11.7 - 15.4 g/dL    HCT 35.9 35.8 - 46.3 %    MCV 84.5 79.6 - 97.8 FL    MCH 28.7 26.1 - 32.9 PG    MCHC 34.0 31.4 - 35.0 g/dL    RDW 14.8 (H) 11.9 - 14.6 %    PLATELET 315 242 - 820 K/uL    MPV 11.6 9.4 - 12.3 FL    ABSOLUTE NRBC 0.00 0.0 - 0.2 K/uL    DF AUTOMATED      NEUTROPHILS 75 43 - 78 %    LYMPHOCYTES 11 (L) 13 - 44 %    MONOCYTES 10 4.0 - 12.0 %    EOSINOPHILS 3 0.5 - 7.8 % BASOPHILS 1 0.0 - 2.0 %    IMMATURE GRANULOCYTES 1 0.0 - 5.0 %    ABS. NEUTROPHILS 4.2 1.7 - 8.2 K/UL    ABS. LYMPHOCYTES 0.6 0.5 - 4.6 K/UL    ABS. MONOCYTES 0.6 0.1 - 1.3 K/UL    ABS. EOSINOPHILS 0.1 0.0 - 0.8 K/UL    ABS. BASOPHILS 0.0 0.0 - 0.2 K/UL    ABS. IMM. GRANS. 0.1 0.0 - 0.5 K/UL   METABOLIC PANEL, COMPREHENSIVE    Collection Time: 04/06/22  9:40 PM   Result Value Ref Range    Sodium 136 136 - 145 mmol/L    Potassium 4.0 3.5 - 5.1 mmol/L    Chloride 104 98 - 107 mmol/L    CO2 22 21 - 32 mmol/L    Anion gap 10 7 - 16 mmol/L    Glucose 281 (H) 65 - 100 mg/dL    BUN 15 8 - 23 MG/DL    Creatinine 0.95 0.6 - 1.0 MG/DL    GFR est AA >60 >60 ml/min/1.73m2    GFR est non-AA >60 >60 ml/min/1.73m2    Calcium 8.5 8.3 - 10.4 MG/DL    Bilirubin, total 1.5 (H) 0.2 - 1.1 MG/DL    ALT (SGPT) 33 12 - 65 U/L    AST (SGOT) 60 (H) 15 - 37 U/L    Alk. phosphatase 227 (H) 50 - 130 U/L    Protein, total 7.3 6.3 - 8.2 g/dL    Albumin 2.6 (L) 3.2 - 4.6 g/dL    Globulin 4.7 (H) 2.3 - 3.5 g/dL    A-G Ratio 0.6 (L) 1.2 - 3.5     LACTIC ACID    Collection Time: 04/06/22  9:40 PM   Result Value Ref Range    Lactic acid 2.1 (H) 0.4 - 2.0 MMOL/L   URINE MICROSCOPIC    Collection Time: 04/06/22  9:46 PM   Result Value Ref Range    WBC >100 0 /hpf    RBC 5-10 0 /hpf    Epithelial cells 10-20 0 /hpf    Bacteria 4+ (H) 0 /hpf    Casts 0 0 /lpf    Crystals, urine 0 0 /LPF    Mucus 0 0 /lpf    Other observations Microscopic performed on unspun urine. QNS to spin. CULTURE, URINE    Collection Time: 04/06/22  9:46 PM    Specimen: Clean catch; Urine   Result Value Ref Range    Special Requests: NO SPECIAL REQUESTS      Culture result:        NO GROWTH AFTER SHORT PERIOD OF INCUBATION. FURTHER RESULTS TO FOLLOW AFTER OVERNIGHT INCUBATION.    GLUCOSE, POC    Collection Time: 04/07/22  1:09 AM   Result Value Ref Range    Glucose (POC) 302 (H) 65 - 100 mg/dL    Performed by [address] [place] [address], [facility]    Collection Time: 04/07/22  2:04 AM   Result Value Ref Range    Sodium 137 136 - 145 mmol/L    Potassium 4.1 3.5 - 5.1 mmol/L    Chloride 104 98 - 107 mmol/L    CO2 27 21 - 32 mmol/L    Anion gap 6 (L) 7 - 16 mmol/L    Glucose 312 (H) 65 - 100 mg/dL    BUN 14 8 - 23 MG/DL    Creatinine 0.81 0.6 - 1.0 MG/DL    GFR est AA >60 >60 ml/min/1.73m2    GFR est non-AA >60 >60 ml/min/1.73m2    Calcium 8.3 8.3 - 10.4 MG/DL    Bilirubin, total 1.2 (H) 0.2 - 1.1 MG/DL    ALT (SGPT) 31 12 - 65 U/L    AST (SGOT) 35 15 - 37 U/L    Alk. phosphatase 187 (H) 50 - 136 U/L    Protein, total 6.8 6.3 - 8.2 g/dL    Albumin 2.3 (L) 3.2 - 4.6 g/dL    Globulin 4.5 (H) 2.3 - 3.5 g/dL    A-G Ratio 0.5 (L) 1.2 - 3.5     CBC WITH AUTOMATED DIFF    Collection Time: 04/07/22  2:04 AM   Result Value Ref Range    WBC 5.1 4.3 - 11.1 K/uL    RBC 4.03 (L) 4.05 - 5.2 M/uL    HGB 11.6 (L) 11.7 - 15.4 g/dL    HCT 35.1 (L) 35.8 - 46.3 %    MCV 87.1 79.6 - 97.8 FL    MCH 28.8 26.1 - 32.9 PG    MCHC 33.0 31.4 - 35.0 g/dL    RDW 14.7 (H) 11.9 - 14.6 %    PLATELET 749 809 - 086 K/uL    MPV 10.3 9.4 - 12.3 FL    ABSOLUTE NRBC 0.00 0.0 - 0.2 K/uL    DF AUTOMATED      NEUTROPHILS 73 43 - 78 %    LYMPHOCYTES 12 (L) 13 - 44 %    MONOCYTES 12 4.0 - 12.0 %    EOSINOPHILS 1 0.5 - 7.8 %    BASOPHILS 1 0.0 - 2.0 %    IMMATURE GRANULOCYTES 1 0.0 - 5.0 %    ABS. NEUTROPHILS 3.8 1.7 - 8.2 K/UL    ABS. LYMPHOCYTES 0.6 0.5 - 4.6 K/UL    ABS. MONOCYTES 0.6 0.1 - 1.3 K/UL    ABS. EOSINOPHILS 0.1 0.0 - 0.8 K/UL    ABS. BASOPHILS 0.0 0.0 - 0.2 K/UL    ABS. IMM.  GRANS. 0.1 0.0 - 0.5 K/UL   LACTIC ACID    Collection Time: 04/07/22  2:04 AM   Result Value Ref Range    Lactic acid 1.3 0.4 - 2.0 MMOL/L   GLUCOSE, POC    Collection Time: 04/07/22  5:36 AM   Result Value Ref Range    Glucose (POC) 276 (H) 65 - 100 mg/dL    Performed by [facility]        All Micro Results     Procedure Component Value Units Date/Time    CULTURE, URINE [[id number]] Collected: 04/06/22 7927    Order Status: Completed Specimen: Urine from Clean catch Updated: 04/07/22 0947     Special Requests: NO SPECIAL REQUESTS        Culture result:       NO GROWTH AFTER SHORT PERIOD OF INCUBATION. FURTHER RESULTS TO FOLLOW AFTER OVERNIGHT INCUBATION. SARS-CoV-2 Lab Results  \"Novel Coronavirus\" Test: No results found for: COV2NT   \"Emergent Disease\" Test: No results found for: EDPR  \"SARS-COV-2\" Test: No results found for: XGCOVT  \"[facility]\" Test: No results found for: RSLT  Rapid Test: No results found for: COVR       Imaging:  XR ABD (KUB)    Result Date: 4/7/2022  KUB Clinical location: Evaluate NG tube FINDINGS: Two supine view of the upper abdomen shows an NG tube in satisfactory position. NG tube in satisfactory position. XR ABD (KUB)    Result Date: 4/7/2022  EXAM: Abdomen x-rays. INDICATION: Abdominal pain. COMPARISON: Yesterday's CT abdomen. TECHNIQUE: 2 portable supine x-rays of the abdomen were obtained. FINDINGS: Distended small bowel loops have mildly improved compared to yesterday's CT abdomen  view. There is minimal gas in the colon. No gross free air is seen on these supine views. The enteric tube tip is in the proximal stomach. Cholecystectomy clips are seen in the right upper quadrant. Residual CT contrast in the urinary bladder. Mildly improved small bowel obstruction. XR ABD (KUB)    Result Date: 4/7/2022  EXAM: Abdomen x-ray. INDICATION: Enteric tube placement. COMPARISON: Earlier study on the same day. TECHNIQUE: Single frontal upper abdomen x-ray. FINDINGS: The enteric tube is been advanced, with its tip now in the distal stomach. No free air is identified. As above. XR ABD (KUB)    Result Date: 4/7/2022  EXAM: Abdomen x-ray. INDICATION: Enteric tube placement. COMPARISON: None. TECHNIQUE: Single frontal upper abdomen x-ray. FINDINGS: The enteric tube tip lies in the proximal stomach, with its distal side port in the region of the gastroesophageal junction. Advancement by 3-4 cm recommended  to ensure proper function. No free air is identified. As above. CT ABD PELV W CONT    Result Date: 4/6/2022  EXAM: CT abdomen and pelvis with IV contrast. INDICATION: Abdominal pain, nausea and vomiting. COMPARISON: None. TECHNIQUE: Axial CT images of the abdomen and pelvis were obtained after the intravenous injection of 100 mL Isovue 370 CT contrast. Radiation dose reduction techniques were used for this study. Our CT scanners use one or all of the following:  Automated exposure control, adjustment of the mA or kV according to patient size, iterative reconstruction. FINDINGS: - Liver: The liver has a somewhat lobulated contour, suspect for underlying cirrhosis. No focal liver mass is seen. - Gallbladder and bile ducts: Postcholecystectomy. The common bile duct is mildly dilated for a postcholecystectomy patient, measuring 1.4 cm in diameter. - Spleen: The spleen is enlarged, measuring 16 cm in length, and contains a few scattered calcified granulomas. - Urinary tract: Within normal limits. - Adrenals: Within normal limits. - Pancreas: Within normal limits. - Gastrointestinal tract: There are multiple distended and fluid-filled small bowel loops in the abdomen entering up to 3.3 cm in diameter. Decompressed loops are seen in the right abdomen, suggesting a small bowel obstruction. The site of obstruction appears to be at the level of a small umbilical hernia which contains a short loop of small bowel and a small amount of fluid. The colon is predominantly collapsed, limiting evaluation. The appendix is not clearly visualized. - Retroperitoneum: Mild abdominal aortic atherosclerosis, with no aneurysmal dilatation or dissection. - Peritoneal cavity and abdominal wall: There is mild ascites. No focal abscess or free intraperitoneal air is identified. Also noted are upper abdominal and paraesophageal varicosities. - Pelvis: Within normal limits.  - Spine/bones: No acute process. There are subacute/healing lower left rib fractures. - Other comments: The lung bases are clear. 1. Small bowel obstruction, at the level of an umbilical hernia containing a short loop of small bowel. 2. Cirrhotic liver, with upper abdominal/paraesophageal varicosities, splenomegaly and mild ascites suggesting portal hypertension. The portal vein appears patent. 3. Dilated 1.4 cm common bile duct, in this postcholecystectomy patient, of unclear etiology. IR PARACENTESIS ABD W IMAGE    Result Date: 4/6/2022  Title: Ultrasound guided paracentesis. History: 41-year-old female with [place], ascites. :  Leonel Heaton PA-C Supervising Physician: Cynthia Chahal M.D. Consent:  Informed written and oral consent was obtained from the patient after the risks and benefits were discussed. All questions were answered and the patient requested that we proceed. Procedure:  Maximal sterile barrier technique was used. Following routine prep and drape of the abdomen, a local field block with 1% lidocaine was achieved. Ultrasound evaluation was performed. Fluid was identified in the peritoneal cavity. Using real-time ultrasound guidance, the peritoneal cavity was accessed with an 8 Jordanian centesis catheter. The catheter was connected to wall suction. A total of 8650 cc of thin yellow fluid was removed. The catheter was removed and a bandage applied. Complications: None. Findings: Large-volume ascites. Uncomplicated ultrasound guided paracentesis. No results found for this visit on 04/06/22.     Current Meds:  Current Facility-Administered Medications   Medication Dose Route Frequency    insulin lispro (HUMALOG) injection   SubCUTAneous Q6H    sodium chloride (NS) flush 5-40 mL  5-40 mL IntraVENous Q8H    sodium chloride (NS) flush 5-40 mL  5-40 mL IntraVENous PRN    acetaminophen (TYLENOL) tablet 650 mg  650 mg Oral Q6H PRN    Or    acetaminophen (TYLENOL) suppository 650 mg  650 mg Rectal Q6H PRN    polyethylene glycol (MIRALAX) packet 17 g  17 g Oral DAILY PRN    ondansetron (ZOFRAN ODT) tablet 4 mg  4 mg Oral Q8H PRN    Or    ondansetron (ZOFRAN) injection 4 mg  4 mg IntraVENous Q6H PRN    cefTRIAXone (ROCEPHIN) 1 g in 0.9% sodium chloride (MBP/ADV) 50 mL MBP  1 g IntraVENous Q24H    pantoprazole (PROTONIX) 40 mg in 0.9% sodium chloride 10 mL injection  40 mg IntraVENous DAILY    HYDROmorphone (DILAUDID) injection 0.2 mg  0.2 mg IntraVENous Q4H PRN    0.9% sodium chloride infusion  150 mL/hr IntraVENous CONTINUOUS     Facility-Administered Medications Ordered in Other Encounters   Medication Dose Route Frequency    lidocaine (XYLOCAINE) 20 mg/mL (2 %) injection  mg  1-10 mL IntraDERMal Multiple       Discharge Plan:     TBD    Signed By: Luiz Ritter DO     April 7, 2022

## 2022-04-07 NOTE — PROGRESS NOTES
Re-evaluated patient due to increasing pain. After pt arrived to the floor, RN reported that patient had some blood and clot-appearing matter in her NG cannister. I contacted General Surgery again to let them know about this new finding. Over the past 30 minutes, patient has been complaining of worsening pain despite IV morphine. She feels as though her belly is going to \"explode. \"  Very quiet bowel sounds on auscultation. Umbilical hernia remains full and TTP. Vital signs stable. Have asked RN to notify surgery about worsening abdominal pain as well.

## 2022-04-07 NOTE — PROGRESS NOTES
Patient complaining of nausea and worsening abdominal pain despite Morphine administration. PRN Zofran 4 mg IV administered. Dr. Monica Ayers to place orders for KUB and pain medication. Patient states she feels like her Cathren Bolk is going to explode\". Dr Monica Ayers and Jose Shepherd NP notified.

## 2022-04-07 NOTE — PROGRESS NOTES
Care Management Interventions  Transition of Care Consult (CM Consult): Discharge Planning  Support Systems: Other Family Member(s) (Pt lives with her sister Cristi George)  Confirm Follow Up Transport: Family  Discharge Location  Patient Expects to be Discharged to[de-identified] Unable to determine at this time    Sw reviewed chart and discussed pt in Md care rounds this am. Pt is a 66 yr old female who was having a paracentesis in IR yesterday. Pt went out to eat afterwards and began vomiting uncontrollably. Pt adm with SBO and hernia. Pt has an NG and is scheduled for surgery this pm. Sw talked with pt, her niece and nephew. Pt lives with her sister Cristi George in [place] ([address] [place] [address]). Niece informed pt does not drive and is quite shaky on her feet. Sw asked why and niece did not know. Pt made several statements that were not accurate-Sw wondering if pt has some dementia. Niece mentioned pt has a Palliative Care Nurse who comes out but that was all she knew. Sw briefly informed about home health care services that could support family after surgery and discharge home. Sw also mentioned about pt possibly needing to go to a \"rehab\". Pt stated \"not if I can help it\". Unsure exactly pt's baseline and prior limitations. Sw will need to follow up s/p surgery, therapy evals, and talking with sister.  Sw to cont to follow and assist.  Em Guerrero

## 2022-04-08 LAB
ALBUMIN SERPL-MCNC: 2.2 G/DL (ref 3.2–4.6)
ALBUMIN/GLOB SERPL: 0.6 {RATIO} (ref 1.2–3.5)
ALP SERPL-CCNC: 142 U/L (ref 50–136)
ALT SERPL-CCNC: 23 U/L (ref 12–65)
ANION GAP SERPL CALC-SCNC: 7 MMOL/L (ref 7–16)
AST SERPL-CCNC: 33 U/L (ref 15–37)
BASOPHILS # BLD: 0 K/UL (ref 0–0.2)
BASOPHILS NFR BLD: 1 % (ref 0–2)
BILIRUB SERPL-MCNC: 1.1 MG/DL (ref 0.2–1.1)
BUN SERPL-MCNC: 11 MG/DL (ref 8–23)
CALCIUM SERPL-MCNC: 7.9 MG/DL (ref 8.3–10.4)
CHLORIDE SERPL-SCNC: 105 MMOL/L (ref 98–107)
CO2 SERPL-SCNC: 25 MMOL/L (ref 21–32)
CREAT SERPL-MCNC: 0.8 MG/DL (ref 0.6–1)
DIFFERENTIAL METHOD BLD: ABNORMAL
EOSINOPHIL # BLD: 0.2 K/UL (ref 0–0.8)
EOSINOPHIL NFR BLD: 3 % (ref 0.5–7.8)
ERYTHROCYTE [DISTWIDTH] IN BLOOD BY AUTOMATED COUNT: 15 % (ref 11.9–14.6)
GLOBULIN SER CALC-MCNC: 3.9 G/DL (ref 2.3–3.5)
GLUCOSE BLD STRIP.AUTO-MCNC: 180 MG/DL (ref 65–100)
GLUCOSE BLD STRIP.AUTO-MCNC: 209 MG/DL (ref 65–100)
GLUCOSE BLD STRIP.AUTO-MCNC: 225 MG/DL (ref 65–100)
GLUCOSE BLD STRIP.AUTO-MCNC: 230 MG/DL (ref 65–100)
GLUCOSE BLD STRIP.AUTO-MCNC: 241 MG/DL (ref 65–100)
GLUCOSE BLD STRIP.AUTO-MCNC: 256 MG/DL (ref 65–100)
GLUCOSE SERPL-MCNC: 214 MG/DL (ref 65–100)
HCT VFR BLD AUTO: 35.1 % (ref 35.8–46.3)
HGB BLD-MCNC: 11.6 G/DL (ref 11.7–15.4)
IMM GRANULOCYTES # BLD AUTO: 0.1 K/UL (ref 0–0.5)
IMM GRANULOCYTES NFR BLD AUTO: 1 % (ref 0–5)
LYMPHOCYTES # BLD: 0.7 K/UL (ref 0.5–4.6)
LYMPHOCYTES NFR BLD: 10 % (ref 13–44)
MCH RBC QN AUTO: 29.1 PG (ref 26.1–32.9)
MCHC RBC AUTO-ENTMCNC: 33 G/DL (ref 31.4–35)
MCV RBC AUTO: 88.2 FL (ref 79.6–97.8)
MONOCYTES # BLD: 0.8 K/UL (ref 0.1–1.3)
MONOCYTES NFR BLD: 10 % (ref 4–12)
NEUTS SEG # BLD: 5.7 K/UL (ref 1.7–8.2)
NEUTS SEG NFR BLD: 76 % (ref 43–78)
NRBC # BLD: 0 K/UL (ref 0–0.2)
PLATELET # BLD AUTO: 177 K/UL (ref 150–450)
PMV BLD AUTO: 9.8 FL (ref 9.4–12.3)
POTASSIUM SERPL-SCNC: 3.4 MMOL/L (ref 3.5–5.1)
PROT SERPL-MCNC: 6.1 G/DL (ref 6.3–8.2)
RBC # BLD AUTO: 3.98 M/UL (ref 4.05–5.2)
SERVICE CMNT-IMP: ABNORMAL
SODIUM SERPL-SCNC: 137 MMOL/L (ref 136–145)
WBC # BLD AUTO: 7.5 K/UL (ref 4.3–11.1)

## 2022-04-08 PROCEDURE — C9113 INJ PANTOPRAZOLE SODIUM, VIA: HCPCS | Performed by: NURSE PRACTITIONER

## 2022-04-08 PROCEDURE — 74011250637 HC RX REV CODE- 250/637: Performed by: INTERNAL MEDICINE

## 2022-04-08 PROCEDURE — 74011636637 HC RX REV CODE- 636/637: Performed by: INTERNAL MEDICINE

## 2022-04-08 PROCEDURE — 65270000029 HC RM PRIVATE

## 2022-04-08 PROCEDURE — 74011000258 HC RX REV CODE- 258: Performed by: FAMILY MEDICINE

## 2022-04-08 PROCEDURE — 36415 COLL VENOUS BLD VENIPUNCTURE: CPT

## 2022-04-08 PROCEDURE — 3331090001 HH PPS REVENUE CREDIT

## 2022-04-08 PROCEDURE — 74011000250 HC RX REV CODE- 250: Performed by: NURSE PRACTITIONER

## 2022-04-08 PROCEDURE — 74011250636 HC RX REV CODE- 250/636: Performed by: SURGERY

## 2022-04-08 PROCEDURE — 94760 N-INVAS EAR/PLS OXIMETRY 1: CPT

## 2022-04-08 PROCEDURE — 74011250637 HC RX REV CODE- 250/637: Performed by: SURGERY

## 2022-04-08 PROCEDURE — 74011636637 HC RX REV CODE- 636/637: Performed by: FAMILY MEDICINE

## 2022-04-08 PROCEDURE — 3331090002 HH PPS REVENUE DEBIT

## 2022-04-08 PROCEDURE — 82962 GLUCOSE BLOOD TEST: CPT

## 2022-04-08 PROCEDURE — 74011250636 HC RX REV CODE- 250/636: Performed by: NURSE PRACTITIONER

## 2022-04-08 PROCEDURE — 85025 COMPLETE CBC W/AUTO DIFF WBC: CPT

## 2022-04-08 PROCEDURE — 77010033678 HC OXYGEN DAILY

## 2022-04-08 PROCEDURE — 74011250636 HC RX REV CODE- 250/636: Performed by: FAMILY MEDICINE

## 2022-04-08 PROCEDURE — 80053 COMPREHEN METABOLIC PANEL: CPT

## 2022-04-08 PROCEDURE — 74011000250 HC RX REV CODE- 250: Performed by: FAMILY MEDICINE

## 2022-04-08 RX ORDER — FLUOXETINE HYDROCHLORIDE 20 MG/1
40 CAPSULE ORAL DAILY
Status: DISCONTINUED | OUTPATIENT
Start: 2022-04-08 | End: 2022-04-11 | Stop reason: HOSPADM

## 2022-04-08 RX ORDER — INSULIN GLARGINE 100 [IU]/ML
5 INJECTION, SOLUTION SUBCUTANEOUS
Status: DISCONTINUED | OUTPATIENT
Start: 2022-04-08 | End: 2022-04-09

## 2022-04-08 RX ADMIN — SODIUM CHLORIDE, PRESERVATIVE FREE 10 ML: 5 INJECTION INTRAVENOUS at 06:24

## 2022-04-08 RX ADMIN — SODIUM CHLORIDE, PRESERVATIVE FREE 40 MG: 5 INJECTION INTRAVENOUS at 08:40

## 2022-04-08 RX ADMIN — Medication 4 UNITS: at 06:24

## 2022-04-08 RX ADMIN — OXYCODONE HYDROCHLORIDE 10 MG: 5 TABLET ORAL at 18:23

## 2022-04-08 RX ADMIN — SODIUM CHLORIDE, PRESERVATIVE FREE 10 ML: 5 INJECTION INTRAVENOUS at 13:28

## 2022-04-08 RX ADMIN — Medication 2 UNITS: at 00:24

## 2022-04-08 RX ADMIN — INSULIN GLARGINE 5 UNITS: 100 INJECTION, SOLUTION SUBCUTANEOUS at 23:33

## 2022-04-08 RX ADMIN — Medication 4 UNITS: at 23:33

## 2022-04-08 RX ADMIN — SODIUM CHLORIDE 75 ML/HR: 900 INJECTION, SOLUTION INTRAVENOUS at 12:45

## 2022-04-08 RX ADMIN — FLUOXETINE 40 MG: 20 CAPSULE ORAL at 13:28

## 2022-04-08 RX ADMIN — Medication 4 UNITS: at 12:04

## 2022-04-08 RX ADMIN — SODIUM CHLORIDE, PRESERVATIVE FREE 10 ML: 5 INJECTION INTRAVENOUS at 22:00

## 2022-04-08 RX ADMIN — OXYCODONE HYDROCHLORIDE 10 MG: 5 TABLET ORAL at 04:43

## 2022-04-08 RX ADMIN — RIFAXIMIN 550 MG: 550 TABLET ORAL at 18:23

## 2022-04-08 RX ADMIN — CEFTRIAXONE 1 G: 1 INJECTION, POWDER, FOR SOLUTION INTRAMUSCULAR; INTRAVENOUS at 23:36

## 2022-04-08 RX ADMIN — Medication 4 UNITS: at 18:00

## 2022-04-08 NOTE — PROGRESS NOTES
Hospitalist Progress Note   Admit Date:  2022  9:08 PM   LOS: 2 days   Name:  Bran Kuhn   Age:  66 y.o. Sex:  female  :  1950   MRN:  [id number]     Chief Complaint: Abdominal pain with N/V  Reason(s) for Admission: Incarcerated umbilical hernia [G64.2]  SBO (small bowel obstruction) ([address] [K56.609]  UTI (urinary tract infection) [N39.0]     Hospital Course & Interval History:   66 y.o. female with a PMH of CARABALLO cirrhosis who presented to ED with abdominal pain after undergoing paracentesis. On , she got the paracentesis, had dinner and then she became ill with nausea and vomiting on the drive home. Upon ER evaluation, patient found to have an incarcerated umbilical hernia. Unsuccessful bedside reduction x3.  CT A/P was obtained which shows SBO at level of umbilical hernia. UA is also indicative of infection. On-call General Surgery was consulted by ER due to incarcerated hernia. She was admitted and NGT was placed. She went to the OR on  for surgical hernia reduction and mesh placement. Subjective (22): Mildly confused this AM. Had been calling out for her sister, Presley Gold all morning. Presley Gold now at bedside and patient calmer. Nausea resolved. Denies vomiting since admission. Denies CP/SOB. Denies F/C. Review of systems negative except stated above.     Assessment & Plan:     Principal Problem:    Incarcerated umbilical hernia (8369)  -  CTAP with incarcerated ventral hernia with SBO  -  Went to OR for hernia repair  -  Abdominal pain minimal, tolerating CLD, no flatus/BM  - Unable to be reduced after multiple attempts  - NGT in place to LIS  - Pain control  - Nausea control  - Gen Surg to take to OR this afternoon    Active Problems:    SBO (small bowel obstruction) ([facility] [address] (2022)  - Due to #1  -  Went to OR for hernia repair  -  Abdominal pain minimal, tolerating CLD, no flatus/BM  - Pain control  - Nausea control  - Gen Surg to take to OR this afternoon      UTI (urinary tract infection) (9/29/2020)  - UA consistent with UTI  - Continue Ceftriaxone  - Prelim urine culture with GNR  - Await final urine culture results      Intractable nausea and vomiting (4/7/2022)  - Due to #1 & #2  - NGT in place  - Zofran PRN      Type II diabetes mellitus ([facility] (11/19/2013)  - A1C 8.0  - Glucose elevated today  - 4/8 Add Lantus 5U tonight  - Continue Humalog SSI      Liver 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 (nonalcoholic steatohepatitis) ([facility] (4/26/2021)  - S/P paracentesis on 4/6  - 4/7 s/p 3.2L removed in OR during surgery  - Continue to monitor      Esophageal varices determined by endoscopy ([facility] (12/8/2021)      Hiatal hernia (12/8/2021)      CAD (coronary artery disease) (4/7/2022)      Dyslipidemia (6/30/2020)        RIA (obstructive sleep apnea) (8/9/2012)    Diet:  ADULT DIET Clear Liquid  DVT PPx: SCDs  Code status: Full Code    Hospital Problems as of 4/8/2022 Date Reviewed: 3/21/2022          Codes Class Noted - Resolved POA    * (Principal) Incarcerated umbilical hernia [id number]: K42.0  ICD-9-CM: 552.1  4/6/2022 - Present Yes        SBO (small bowel obstruction) ([facility] ICD-10-CM: C87.041  ICD-9-CM: 560.9  4/6/2022 - Present Yes        Intractable nausea and vomiting ICD-10-CM: R11.2  ICD-9-CM: 536.2  4/7/2022 - Present Yes        UTI (urinary tract infection) ICD-10-CM: N39.0  ICD-9-CM: 599.0  9/29/2020 - Present Yes        Liver 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 (nonalcoholic steatohepatitis) ([facility] ICD-10-CM: K75.81, K74.60  ICD-9-CM: 571.8, 571.5  4/26/2021 - Present Unknown        Type II diabetes mellitus ([facility] ICD-10-CM: E11.9  ICD-9-CM: 250.00  11/19/2013 - Present Yes        CAD (coronary artery disease) ICD-10-CM: I25.10  ICD-9-CM: 414.00  4/7/2022 - Present Yes        Esophageal varices determined by endoscopy ([facility] ICD-10-CM: I85.00  ICD-9-CM: 456.1  12/8/2021 - Present Yes        Hiatal hernia ICD-10-CM: K44.9  ICD-9-CM: 553.3  12/8/2021 - Present Yes Dyslipidemia ICD-10-CM: E78.5  ICD-9-CM: 272.4  6/30/2020 - Present Yes        RIA (obstructive sleep apnea) ICD-10-CM: G47.33  ICD-9-CM: 327.23  8/9/2012 - Present Yes              Objective:     Patient Vitals for the past 24 hrs:   Temp Pulse Resp BP SpO2   04/08/22 0749     97 %   04/08/22 0708 98.8 °F (37.1 °C) 92 18 117/67 94 %   04/08/22 0447 98.4 °F (36.9 °C) 84 18 121/64 98 %   04/08/22 0008 98.9 °F (37.2 °C) 85 16 120/66 91 %   04/07/22 2112 98.1 °F (36.7 °C) 86 16 129/69 95 %   04/07/22 2045  84  133/65 98 %   04/07/22 2040  85 15 135/71 95 %   04/07/22 2035  85 15 130/60 95 %   04/07/22 2030  88 15 128/63 (!) 89 %   04/07/22 2025  88 15 130/62 93 %   04/07/22 2020  92 15 (!) 148/69 93 %   04/07/22 2019 98.6 °F (37 °C) 83 15 (!) 152/80 99 %   04/07/22 1623 97.7 °F (36.5 °C) 82 16 (!) 140/62 95 %   04/07/22 1146 98.2 °F (36.8 °C) 81 12 (!) 141/72 96 %     Oxygen Therapy  O2 Sat (%): 97 % (04/08/22 0749)  Pulse via Oximetry: 83 beats per minute (04/08/22 0749)  O2 Device: Nasal cannula (04/08/22 0835)  Skin Assessment: Clean, dry, & intact (04/08/22 0749)  Skin Protection for O2 Device: N/A (04/08/22 0749)  O2 Flow Rate (L/min): 1 l/min (04/08/22 0835)    Estimated body mass index is 30.04 kg/m² as calculated from the following:    Height as of 3/25/22: 5' 4\" (1.626 m). Weight as of 3/25/22: 79.4 kg (175 lb). Intake/Output Summary (Last 24 hours) at 4/8/2022 1058  Last data filed at 4/8/2022 0447  Gross per 24 hour   Intake 500 ml   Output 3225 ml   Net -2725 ml         Physical Exam:   General:    Well nourished. No overt distress  Head:  Normocephalic, atraumatic  Eyes:  Sclerae appear normal.  Pupils equally round. ENT:  Nares appear normal, no drainage. Moist oral mucosa  Neck:  No restricted ROM. Trachea midline   CV:   RRR. No m/r/g. No jugular venous distension. Lungs:   CTAB. No wheezing, rhonchi, or rales. Respirations even, unlabored  Abdomen: Bowel sounds present. Soft, nontender, mildly distended. Extremities: No cyanosis or clubbing. No edema  Skin:     No rashes and normal coloration. Warm and dry. Neuro: Cranial nerves II-XII grossly intact. Sensation intact  Psych: Normal mood and affect. Alert and oriented x3 but mildly confused    I have reviewed ordered lab tests and independently visualized imaging below:    Last 24hr Labs:  Recent Results (from the past 24 hour(s))   GLUCOSE, POC    Collection Time: 04/07/22 11:39 AM   Result Value Ref Range    Glucose (POC) 275 (H) 65 - 100 mg/dL    Performed by Sierra [address] 308, POC    Collection Time: 04/07/22  4:27 PM   Result Value Ref Range    Glucose (POC) 249 (H) 65 - 100 mg/dL    Performed by Sierra [address] 308, POC    Collection Time: 04/07/22  6:07 PM   Result Value Ref Range    Glucose (POC) 221 (H) 65 - 100 mg/dL    Performed by Noel    GLUCOSE, POC    Collection Time: 04/08/22 12:15 AM   Result Value Ref Range    Glucose (POC) 180 (H) 65 - 100 mg/dL    Performed by Ash    METABOLIC PANEL, COMPREHENSIVE    Collection Time: 04/08/22  5:02 AM   Result Value Ref Range    Sodium 137 136 - 145 mmol/L    Potassium 3.4 (L) 3.5 - 5.1 mmol/L    Chloride 105 98 - 107 mmol/L    CO2 25 21 - 32 mmol/L    Anion gap 7 7 - 16 mmol/L    Glucose 214 (H) 65 - 100 mg/dL    BUN 11 8 - 23 MG/DL    Creatinine 0.80 0.6 - 1.0 MG/DL    GFR est AA >60 >60 ml/min/1.73m2    GFR est non-AA >60 >60 ml/min/1.73m2    Calcium 7.9 (L) 8.3 - 10.4 MG/DL    Bilirubin, total 1.1 0.2 - 1.1 MG/DL    ALT (SGPT) 23 12 - 65 U/L    AST (SGOT) 33 15 - 37 U/L    Alk.  phosphatase 142 (H) 50 - 136 U/L    Protein, total 6.1 (L) 6.3 - 8.2 g/dL    Albumin 2.2 (L) 3.2 - 4.6 g/dL    Globulin 3.9 (H) 2.3 - 3.5 g/dL    A-G Ratio 0.6 (L) 1.2 - 3.5     CBC WITH AUTOMATED DIFF    Collection Time: 04/08/22  5:02 AM   Result Value Ref Range    WBC 7.5 4.3 - 11.1 K/uL    RBC 3.98 (L) 4.05 - 5.2 M/uL    HGB 11.6 (L) 11.7 - 15.4 g/dL    HCT 35.1 (L) 35.8 - 46.3 %    MCV 88.2 79.6 - 97.8 FL    MCH 29.1 26.1 - 32.9 PG    MCHC 33.0 31.4 - 35.0 g/dL    RDW 15.0 (H) 11.9 - 14.6 %    PLATELET 945 326 - 488 K/uL    MPV 9.8 9.4 - 12.3 FL    ABSOLUTE NRBC 0.00 0.0 - 0.2 K/uL    DF AUTOMATED      NEUTROPHILS 76 43 - 78 %    LYMPHOCYTES 10 (L) 13 - 44 %    MONOCYTES 10 4.0 - 12.0 %    EOSINOPHILS 3 0.5 - 7.8 %    BASOPHILS 1 0.0 - 2.0 %    IMMATURE GRANULOCYTES 1 0.0 - 5.0 %    ABS. NEUTROPHILS 5.7 1.7 - 8.2 K/UL    ABS. LYMPHOCYTES 0.7 0.5 - 4.6 K/UL    ABS. MONOCYTES 0.8 0.1 - 1.3 K/UL    ABS. EOSINOPHILS 0.2 0.0 - 0.8 K/UL    ABS. BASOPHILS 0.0 0.0 - 0.2 K/UL    ABS. IMM. GRANS. 0.1 0.0 - 0.5 K/UL   GLUCOSE, POC    Collection Time: 04/08/22  6:06 AM   Result Value Ref Range    Glucose (POC) 209 (H) 65 - 100 mg/dL    Performed by [facility]        All Micro Results     Procedure Component Value Units Date/Time    CULTURE, URINE [[id number]]  (Abnormal) Collected: 04/06/22 2145    Order Status: Completed Specimen: Urine from Clean catch Updated: 04/08/22 1207     Special Requests: NO SPECIAL REQUESTS        Culture result:       >100,000 COLONIES/mL GRAM NEGATIVE RODS SUBCULTURE IN PROGRESS                  10,000 to 50,000 COLONIES/mL MIXED SKIN ALEKSANDR ISOLATED                  CULTURE IN PROGRESS,FURTHER UPDATES TO FOLLOW                SARS-CoV-2 Lab Results  \"Novel Coronavirus\" Test: No results found for: COV2NT   \"Emergent Disease\" Test: No results found for: EDPR  \"SARS-COV-2\" Test: No results found for: XGCOVT  \"Precision Labs\" Test: No results found for: RSLT  Rapid Test: No results found for: COVR       Imaging:  No results found. No results found for this visit on 04/06/22.     Current Meds:  Current Facility-Administered Medications   Medication Dose Route Frequency    insulin lispro (HUMALOG) injection   SubCUTAneous Q6H    sodium chloride (NS) flush 5-40 mL  5-40 mL IntraVENous Q8H    sodium chloride (NS) flush 5-40 mL  5-40 mL IntraVENous PRN    acetaminophen (TYLENOL) tablet 650 mg  650 mg Oral Q6H PRN    Or    acetaminophen (TYLENOL) suppository 650 mg  650 mg Rectal Q6H PRN    polyethylene glycol (MIRALAX) packet 17 g  17 g Oral DAILY PRN    ondansetron (ZOFRAN ODT) tablet 4 mg  4 mg Oral Q8H PRN    Or    ondansetron (ZOFRAN) injection 4 mg  4 mg IntraVENous Q6H PRN    cefTRIAXone (ROCEPHIN) 1 g in 0.9% sodium chloride (MBP/ADV) 50 mL MBP  1 g IntraVENous Q24H    pantoprazole (PROTONIX) 40 mg in 0.9% sodium chloride 10 mL injection  40 mg IntraVENous DAILY    HYDROmorphone (DILAUDID) injection 0.5 mg  0.5 mg IntraVENous Q4H PRN    oxyCODONE IR (ROXICODONE) tablet 5-15 mg  5-15 mg Oral Q4H PRN    0.9% sodium chloride infusion  75 mL/hr IntraVENous CONTINUOUS     Facility-Administered Medications Ordered in Other Encounters   Medication Dose Route Frequency    lidocaine (XYLOCAINE) 20 mg/mL (2 %) injection  mg  1-10 mL IntraDERMal Multiple       Discharge Plan:     TBD    Signed By: Sumaya Lopes DO     April 8, 2022

## 2022-04-08 NOTE — ANESTHESIA POSTPROCEDURE EVALUATION
Procedure(s):  INCARCERATED INCISIONAL HERNIA.    general    Anesthesia Post Evaluation      Multimodal analgesia: multimodal analgesia used between 6 hours prior to anesthesia start to PACU discharge  Patient location during evaluation: PACU  Patient participation: complete - patient participated  Level of consciousness: awake and awake and alert  Pain management: adequate  Airway patency: patent  Anesthetic complications: no  Cardiovascular status: acceptable  Respiratory status: acceptable  Hydration status: acceptable  Post anesthesia nausea and vomiting:  controlled      INITIAL Post-op Vital signs:   Vitals Value Taken Time   /65 04/07/22 2045   Temp 37 °C (98.6 °F) 04/07/22 2019   Pulse 90 04/07/22 2049   Resp 15 04/07/22 2040   SpO2 98 % 04/07/22 2049   Vitals shown include unvalidated device data.

## 2022-04-08 NOTE — OP NOTES
Operative Report    Patient: Raghav Dunham MRN: [id number]     YOB: 1950  Age: 66 y.o. Sex: female       Date of Surgery: 4/7/2022     Preoperative Diagnosis: Incarcerated hernia     Postoperative Diagnosis: Incarcerated hernia     Procedure:  Procedure(s):  INCARCERATED INCISIONAL HERNIA REPAIR WITH MESH    Anesthesia: General     Complications: none    Indications:  As outlined in History and Physical.    Procedure Details   Informed consent was obtained and the patient was brought to the operating room and placed on the table in a supine position. After successful induction of anesthesia, the abdomen was prepped and draped in the standard fashion. A vertical midline supraumbilical short incision was made over the mostly reduced hernia and was carried down through the subcutaneous tissues with cautery until the hernia sac was identified. The  sac was dissected from the surrounding structures with cautery and it was incised circumferentially at its base and removed. There was a small volume of minimally bloody ascites in the suprafascial space. Incarcerated omentum was encountered, wihth a small portion of the tip infarcted. This was reduced allowing identification of additional unremarkable ascites; approximately 3200cc was removed. There were no adhesions of the omentum or bowel to the abdominal wall. The small bowel was examined proximally and distally to the hernia; the previously herniated segment was identified revealing induration but no ischemia; this corresponded to an apparent caliber change of bowel lumen. There were no additional fascial defects noted in the surrounding fascia which was significantly attenuated. The final defect measured 4 by 3 cm. A  Large ventralex  mesh was brought to the field. It was inserted into the abdomen and unfurled, taking care to assure no entrapped omentum or bowel.   The  positioning pouches were secured to the margins of the fascia with prolene suture to anchor the mesh, and the fascial edges were then closed over the mesh. The area was irrigated and confirmed to be hemostatic and was infiltrated with local.  The wound was then closed with  3-0 vicryl in the deep space and the skin with staples. A gauze dressing was applied. The patient tolerated the procedure well and was awakened from anesthesia and taken to recovery in satisfactory condition. Sponge, needle, and instrument counts were correct as reported to me.       * No specimens in log *    Christ Ayers MD  April 7, 2022

## 2022-04-08 NOTE — PROGRESS NOTES
04/08/22 0749   Oxygen Therapy   O2 Sat (%) 97 %   Pulse via Oximetry 83 beats per minute   O2 Device Nasal cannula   Skin Assessment Clean, dry, & intact   Skin Protection for O2 Device N/A   O2 Flow Rate (L/min) 2 l/min  (decreased to 1 lpm, will wean as tolerated)

## 2022-04-08 NOTE — PROGRESS NOTES
Admit Date: 2022    POD 1 Day Post-Op    Procedure:  Procedure(s):  INCARCERATED INCISIONAL HERNIA    Subjective:     Patient has complaints of soreness. Was sleeping upon my arrival.      Objective:     Visit Vitals  BP (!) 126/58 (BP 1 Location: Right upper arm, BP Patient Position: Supine)   Pulse 88   Temp 98.9 °F (37.2 °C)   Resp 16   SpO2 97%       Temp (24hrs), Av.6 °F (37 °C), Min:98.1 °F (36.7 °C), Max:98.9 °F (37.2 °C)  . I&O reviewed as documented. Physical Exam:    General-in no distress. Abdomen- Wound clean, dry, no drainage and pt has mild appropriately distributed tenderness along incision.  (+)distention and probably some ascites accumuluation. Labs:   Recent Results (from the past 24 hour(s))   GLUCOSE, POC    Collection Time: 22  6:07 PM   Result Value Ref Range    Glucose (POC) 221 (H) 65 - 100 mg/dL    Performed by Noel    GLUCOSE, POC    Collection Time: 22 12:15 AM   Result Value Ref Range    Glucose (POC) 180 (H) 65 - 100 mg/dL    Performed by [facility]    METABOLIC PANEL, COMPREHENSIVE    Collection Time: 22  5:02 AM   Result Value Ref Range    Sodium 137 136 - 145 mmol/L    Potassium 3.4 (L) 3.5 - 5.1 mmol/L    Chloride 105 98 - 107 mmol/L    CO2 25 21 - 32 mmol/L    Anion gap 7 7 - 16 mmol/L    Glucose 214 (H) 65 - 100 mg/dL    BUN 11 8 - 23 MG/DL    Creatinine 0.80 0.6 - 1.0 MG/DL    GFR est AA >60 >60 ml/min/1.73m2    GFR est non-AA >60 >60 ml/min/1.73m2    Calcium 7.9 (L) 8.3 - 10.4 MG/DL    Bilirubin, total 1.1 0.2 - 1.1 MG/DL    ALT (SGPT) 23 12 - 65 U/L    AST (SGOT) 33 15 - 37 U/L    Alk.  phosphatase 142 (H) 50 - 136 U/L    Protein, total 6.1 (L) 6.3 - 8.2 g/dL    Albumin 2.2 (L) 3.2 - 4.6 g/dL    Globulin 3.9 (H) 2.3 - 3.5 g/dL    A-G Ratio 0.6 (L) 1.2 - 3.5     CBC WITH AUTOMATED DIFF    Collection Time: 22  5:02 AM   Result Value Ref Range    WBC 7.5 4.3 - 11.1 K/uL    RBC 3.98 (L) 4.05 - 5.2 M/uL    HGB 11.6 (L) 11.7 - 15.4 g/dL    HCT 35.1 (L) 35.8 - 46.3 %    MCV 88.2 79.6 - 97.8 FL    MCH 29.1 26.1 - 32.9 PG    MCHC 33.0 31.4 - 35.0 g/dL    RDW 15.0 (H) 11.9 - 14.6 %    PLATELET 441 377 - 452 K/uL    MPV 9.8 9.4 - 12.3 FL    ABSOLUTE NRBC 0.00 0.0 - 0.2 K/uL    DF AUTOMATED      NEUTROPHILS 76 43 - 78 %    LYMPHOCYTES 10 (L) 13 - 44 %    MONOCYTES 10 4.0 - 12.0 %    EOSINOPHILS 3 0.5 - 7.8 %    BASOPHILS 1 0.0 - 2.0 %    IMMATURE GRANULOCYTES 1 0.0 - 5.0 %    ABS. NEUTROPHILS 5.7 1.7 - 8.2 K/UL    ABS. LYMPHOCYTES 0.7 0.5 - 4.6 K/UL    ABS. MONOCYTES 0.8 0.1 - 1.3 K/UL    ABS. EOSINOPHILS 0.2 0.0 - 0.8 K/UL    ABS. BASOPHILS 0.0 0.0 - 0.2 K/UL    ABS. IMM.  GRANS. 0.1 0.0 - 0.5 K/UL   GLUCOSE, POC    Collection Time: 04/08/22  6:06 AM   Result Value Ref Range    Glucose (POC) 209 (H) 65 - 100 mg/dL    Performed by [facility], POC    Collection Time: 04/08/22 11:48 AM   Result Value Ref Range    Glucose (POC) 225 (H) 65 - 100 mg/dL    Performed by Mely Briscoe             Assessment:     Principal Problem:    Incarcerated umbilical hernia (0/9/1823)    Active Problems:    Type II diabetes mellitus ([facility] (11/19/2013)      Dyslipidemia (6/30/2020)      Esophageal varices determined by endoscopy ([facility] (12/8/2021)      Liver 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 (nonalcoholic steatohepatitis) ([facility] (4/26/2021)      Hiatal hernia (12/8/2021)      RIA (obstructive sleep apnea) (8/9/2012)      UTI (urinary tract infection) (9/29/2020)      SBO (small bowel obstruction) ([address] (4/6/2022)      Intractable nausea and vomiting (4/7/2022)      CAD (coronary artery disease) (4/7/2022)          Plan/Recommendations/Medical Decision Making:     Keep diet at clears due to distention  Discussed with hospitalist- will keep lindquist at least another 24hrs for I/O balance in light of large volume drainage of ascites both 4/6 and intraop 4/7  surg will follow

## 2022-04-08 NOTE — PERIOP NOTES
TRANSFER - OUT REPORT:    Verbal report given to RN Kemar (name) on Kristal Alberts  being transferred to [facility](unit) for routine post - op       Report consisted of patients Situation, Background, Assessment and   Recommendations(SBAR). Information from the following report(s) SBAR, Kardex, OR Summary, Procedure Summary, Intake/Output and MAR was reviewed with the receiving nurse. Lines:   Peripheral IV 04/06/22 Left Antecubital (Active)   Site Assessment Clean, dry, & intact 04/07/22 2045   Phlebitis Assessment 0 04/07/22 2045   Infiltration Assessment 0 04/07/22 2045   Dressing Status Clean, dry, & intact 04/07/22 2045   Dressing Type Tape;Transparent 04/07/22 2045   Hub Color/Line Status Patent; Infusing 04/07/22 2019        Opportunity for questions and clarification was provided.       Patient transported with:   O2 @ 2 liters

## 2022-04-09 LAB
ALBUMIN SERPL-MCNC: 2 G/DL (ref 3.2–4.6)
ALBUMIN/GLOB SERPL: 0.5 {RATIO} (ref 1.2–3.5)
ALP SERPL-CCNC: 148 U/L (ref 50–130)
ALT SERPL-CCNC: 22 U/L (ref 12–65)
ANION GAP SERPL CALC-SCNC: 6 MMOL/L (ref 7–16)
AST SERPL-CCNC: 36 U/L (ref 15–37)
BASOPHILS # BLD: 0 K/UL (ref 0–0.2)
BASOPHILS NFR BLD: 1 % (ref 0–2)
BILIRUB SERPL-MCNC: 1.2 MG/DL (ref 0.2–1.1)
BUN SERPL-MCNC: 8 MG/DL (ref 8–23)
CALCIUM SERPL-MCNC: 8 MG/DL (ref 8.3–10.4)
CHLORIDE SERPL-SCNC: 104 MMOL/L (ref 98–107)
CO2 SERPL-SCNC: 25 MMOL/L (ref 21–32)
CREAT SERPL-MCNC: 0.73 MG/DL (ref 0.6–1)
DIFFERENTIAL METHOD BLD: ABNORMAL
EOSINOPHIL # BLD: 0.2 K/UL (ref 0–0.8)
EOSINOPHIL NFR BLD: 4 % (ref 0.5–7.8)
ERYTHROCYTE [DISTWIDTH] IN BLOOD BY AUTOMATED COUNT: 14.6 % (ref 11.9–14.6)
GLOBULIN SER CALC-MCNC: 3.9 G/DL (ref 2.3–3.5)
GLUCOSE BLD STRIP.AUTO-MCNC: 189 MG/DL (ref 65–100)
GLUCOSE BLD STRIP.AUTO-MCNC: 204 MG/DL (ref 65–100)
GLUCOSE BLD STRIP.AUTO-MCNC: 211 MG/DL (ref 65–100)
GLUCOSE BLD STRIP.AUTO-MCNC: 297 MG/DL (ref 65–100)
GLUCOSE SERPL-MCNC: 188 MG/DL (ref 65–100)
HCT VFR BLD AUTO: 32.9 % (ref 35.8–46.3)
HGB BLD-MCNC: 10.9 G/DL (ref 11.7–15.4)
IMM GRANULOCYTES # BLD AUTO: 0.1 K/UL (ref 0–0.5)
IMM GRANULOCYTES NFR BLD AUTO: 1 % (ref 0–5)
LYMPHOCYTES # BLD: 0.8 K/UL (ref 0.5–4.6)
LYMPHOCYTES NFR BLD: 16 % (ref 13–44)
MCH RBC QN AUTO: 28.7 PG (ref 26.1–32.9)
MCHC RBC AUTO-ENTMCNC: 33.1 G/DL (ref 31.4–35)
MCV RBC AUTO: 86.6 FL (ref 79.6–97.8)
MONOCYTES # BLD: 0.8 K/UL (ref 0.1–1.3)
MONOCYTES NFR BLD: 16 % (ref 4–12)
NEUTS SEG # BLD: 3.2 K/UL (ref 1.7–8.2)
NEUTS SEG NFR BLD: 63 % (ref 43–78)
NRBC # BLD: 0 K/UL (ref 0–0.2)
PLATELET # BLD AUTO: 145 K/UL (ref 150–450)
PMV BLD AUTO: 10.2 FL (ref 9.4–12.3)
POTASSIUM SERPL-SCNC: 4.2 MMOL/L (ref 3.5–5.1)
PROT SERPL-MCNC: 5.9 G/DL (ref 6.3–8.2)
RBC # BLD AUTO: 3.8 M/UL (ref 4.05–5.2)
SERVICE CMNT-IMP: ABNORMAL
SODIUM SERPL-SCNC: 135 MMOL/L (ref 136–145)
WBC # BLD AUTO: 5.2 K/UL (ref 4.3–11.1)

## 2022-04-09 PROCEDURE — 74011000250 HC RX REV CODE- 250: Performed by: NURSE PRACTITIONER

## 2022-04-09 PROCEDURE — 3331090001 HH PPS REVENUE CREDIT

## 2022-04-09 PROCEDURE — 97161 PT EVAL LOW COMPLEX 20 MIN: CPT

## 2022-04-09 PROCEDURE — 3331090002 HH PPS REVENUE DEBIT

## 2022-04-09 PROCEDURE — 85025 COMPLETE CBC W/AUTO DIFF WBC: CPT

## 2022-04-09 PROCEDURE — 74011636637 HC RX REV CODE- 636/637: Performed by: INTERNAL MEDICINE

## 2022-04-09 PROCEDURE — 74011636637 HC RX REV CODE- 636/637: Performed by: FAMILY MEDICINE

## 2022-04-09 PROCEDURE — 82962 GLUCOSE BLOOD TEST: CPT

## 2022-04-09 PROCEDURE — 74011250637 HC RX REV CODE- 250/637: Performed by: SURGERY

## 2022-04-09 PROCEDURE — 74011000250 HC RX REV CODE- 250: Performed by: FAMILY MEDICINE

## 2022-04-09 PROCEDURE — 74011250636 HC RX REV CODE- 250/636: Performed by: NURSE PRACTITIONER

## 2022-04-09 PROCEDURE — 65270000029 HC RM PRIVATE

## 2022-04-09 PROCEDURE — 74011250636 HC RX REV CODE- 250/636: Performed by: SURGERY

## 2022-04-09 PROCEDURE — C9113 INJ PANTOPRAZOLE SODIUM, VIA: HCPCS | Performed by: NURSE PRACTITIONER

## 2022-04-09 PROCEDURE — 74011250637 HC RX REV CODE- 250/637: Performed by: INTERNAL MEDICINE

## 2022-04-09 PROCEDURE — 80053 COMPREHEN METABOLIC PANEL: CPT

## 2022-04-09 PROCEDURE — 74011000258 HC RX REV CODE- 258: Performed by: FAMILY MEDICINE

## 2022-04-09 PROCEDURE — 36415 COLL VENOUS BLD VENIPUNCTURE: CPT

## 2022-04-09 PROCEDURE — 74011250636 HC RX REV CODE- 250/636: Performed by: FAMILY MEDICINE

## 2022-04-09 PROCEDURE — 97530 THERAPEUTIC ACTIVITIES: CPT

## 2022-04-09 RX ORDER — PANTOPRAZOLE SODIUM 40 MG/1
40 TABLET, DELAYED RELEASE ORAL
Status: DISCONTINUED | OUTPATIENT
Start: 2022-04-10 | End: 2022-04-11 | Stop reason: HOSPADM

## 2022-04-09 RX ORDER — INSULIN GLARGINE 100 [IU]/ML
10 INJECTION, SOLUTION SUBCUTANEOUS
Status: DISCONTINUED | OUTPATIENT
Start: 2022-04-09 | End: 2022-04-10

## 2022-04-09 RX ADMIN — Medication 4 UNITS: at 19:02

## 2022-04-09 RX ADMIN — SODIUM CHLORIDE, PRESERVATIVE FREE 10 ML: 5 INJECTION INTRAVENOUS at 17:03

## 2022-04-09 RX ADMIN — RIFAXIMIN 550 MG: 550 TABLET ORAL at 09:08

## 2022-04-09 RX ADMIN — Medication 4 UNITS: at 06:19

## 2022-04-09 RX ADMIN — SODIUM CHLORIDE 75 ML/HR: 900 INJECTION, SOLUTION INTRAVENOUS at 03:24

## 2022-04-09 RX ADMIN — CEFTRIAXONE 1 G: 1 INJECTION, POWDER, FOR SOLUTION INTRAMUSCULAR; INTRAVENOUS at 23:45

## 2022-04-09 RX ADMIN — SODIUM CHLORIDE 75 ML/HR: 900 INJECTION, SOLUTION INTRAVENOUS at 18:54

## 2022-04-09 RX ADMIN — SODIUM CHLORIDE, PRESERVATIVE FREE 10 ML: 5 INJECTION INTRAVENOUS at 23:46

## 2022-04-09 RX ADMIN — SODIUM CHLORIDE, PRESERVATIVE FREE 10 ML: 5 INJECTION INTRAVENOUS at 06:00

## 2022-04-09 RX ADMIN — OXYCODONE HYDROCHLORIDE 10 MG: 5 TABLET ORAL at 17:03

## 2022-04-09 RX ADMIN — FLUOXETINE 40 MG: 20 CAPSULE ORAL at 09:08

## 2022-04-09 RX ADMIN — SODIUM CHLORIDE, PRESERVATIVE FREE 40 MG: 5 INJECTION INTRAVENOUS at 09:07

## 2022-04-09 RX ADMIN — OXYCODONE HYDROCHLORIDE 10 MG: 5 TABLET ORAL at 04:27

## 2022-04-09 RX ADMIN — Medication 10 UNITS: at 23:45

## 2022-04-09 RX ADMIN — Medication 2 UNITS: at 23:45

## 2022-04-09 RX ADMIN — RIFAXIMIN 550 MG: 550 TABLET ORAL at 17:03

## 2022-04-09 RX ADMIN — Medication 6 UNITS: at 13:15

## 2022-04-09 NOTE — PROGRESS NOTES
SW following patient for discharge planning. Patient discussed in rounds and with therapy individually. Anticipate home with home health. SW continuing to follow, will meet with patient and provide a choice list for [facility] services. OT evaluation ordered. Update: Met with patient who advises that she's current with [facility] PT. ANGELINA wrote resumption orders and added RN. Referral resent. Care Management Interventions  PCP Verified by CM:  Yes  Mode of Transport at Discharge: Self  Transition of Care Consult (CM Consult): Discharge Planning,Home Health  Physical Therapy Consult: Yes  Occupational Therapy Consult: Yes  Support Systems: Child(chad)  Confirm Follow Up Transport: Family  The Plan for Transition of Care is Related to the Following Treatment Goals : [facility]  The Patient and/or Patient Representative was Provided with a Choice of Provider and Agrees with the Discharge Plan?: Yes  Name of the Patient Representative Who was Provided with a Choice of Provider and Agrees with the Discharge Plan: Ino Murrieta  Freedom of Choice List was Provided with Basic Dialogue that Supports the Patient's Individualized Plan of Care/Goals, Treatment Preferences and Shares the Quality Data Associated with the Providers?: Yes  Discharge Location  Patient Expects to be Discharged to[de-identified] Home with home health      Michael Sarabia, [facility] Nadir Work   [address] [facility]    * [email or web address]

## 2022-04-09 NOTE — PROGRESS NOTES
Problem: Mobility Impaired (Adult and Pediatric)  Goal: *Acute Goals and Plan of Care (Insert Text)  Outcome: Progressing Towards Goal  Note: STG:  (1.)Ms. Katelynn Castillo will move from supine to sit and sit to supine  with MINIMAL ASSIST within 3 treatment day(s). (2.)Ms. Katelynn Castillo will transfer from bed to chair and chair to bed with CONTACT GUARD ASSIST using the least restrictive device within 3 treatment day(s). (3.)Ms. Katelynn Castillo will ambulate with CONTACT GUARD ASSIST for 50 feet with the least restrictive device within 3 treatment day(s). LTG:  (1.)Ms. Katelynn Castillo will move from supine to sit and sit to supine  in bed with SUPERVISION within 7 treatment day(s). (2.)Ms. Katelynn Castillo will transfer from bed to chair and chair to bed with SUPERVISION using the least restrictive device within 7 treatment day(s). (3.)Ms. Katelynn Castillo will ambulate with SUPERVISION for 100 feet with the least restrictive device within 7 treatment day(s). 4.  Ms. Katelynn Castillo will ambulate up/down 5 steps with left handrail and CGA within 7 treatment days. ________________________________________________________________________________________________      PHYSICAL THERAPY: Initial Assessment and PM 4/9/2022  INPATIENT: PT Visit Days : 1  [place] / [place] / Product Type: Medicare /       NAME/AGE/GENDER: Jazmin Fried is a 66 y.o. female   PRIMARY DIAGNOSIS: Incarcerated umbilical hernia [T45.7]  SBO [facility] obstruction[facility] [K56.609]  UTI (urinary tract infection) [N39.0] Incarcerated umbilical hernia Incarcerated umbilical hernia  Procedure(s) (LRB):  INCARCERATED INCISIONAL HERNIA (N/A)  2 Days Post-Op  ICD-10: Treatment Diagnosis:    Generalized Muscle Weakness (M62.81)  Difficulty in walking, Not elsewhere classified (R26.2)   Precaution/Allergies:  Patient has no known allergies. ASSESSMENT:     Ms. Katelynn Castillo presents with above diagnoses.   She demonstrates decreased strength, balance, and mobility, and decreased tolerance for activity. She would benefit from therapy to address these deficits while admitted. Patient does not use an assistive device at base but admits to relying on furniture for support and also admits to falls. Patient a bit hesitant about using a RW but stressed the need to avoid falls. Patient planning to return home at d/c (lives with sister) and feel this is reasonable with HH and use of RW for support/balance. This section established at most recent assessment   PROBLEM LIST (Impairments causing functional limitations):  Decreased Strength  Decreased ADL/Functional Activities  Decreased Transfer Abilities  Decreased Ambulation Ability/Technique  Decreased Balance  Increased Pain  Decreased Activity Tolerance  Decreased Knowledge of Precautions   INTERVENTIONS PLANNED: (Benefits and precautions of physical therapy have been discussed with the patient.)  Balance Exercise  Bed Mobility  Family Education  Gait Training  Home Exercise Program (HEP)  Therapeutic Activites  Therapeutic Exercise/Strengthening  Transfer Training     TREATMENT PLAN: Frequency/Duration: daily for duration of hospital stay  Rehabilitation Potential For Stated Goals: Good     REHAB RECOMMENDATIONS (at time of discharge pending progress):    Placement: It is my opinion, based on this patient's performance to date, that Ms. Harmeet Mosley may benefit from [address] after discharge due to the functional deficits listed above that are likely to improve with skilled rehabilitation because he/she has multiple medical issues that affect his/her functional mobility in the community. Equipment:   Walkers, Type: Rolling Walker              HISTORY:   History of Present Injury/Illness (Reason for Referral):  Fawad Pichardo is a 66 y.o. female with medical history of cirrhosis who presented to ED with abdominal pain. Patient underwent paracentesis today without issue.   She had dinner tonight, and reports that during the drive home, she became ill with nausea and vomiting. Denies overt fevers/chills. Upon ER evaluation, patient found to have an incarcerated umbilical hernia. Unsuccessful bedside reduction x3.  CT A/P was obtained which shows SBO at level of umbilical hernia. UA is also indicative of infection. On-call General Surgery was consulted by ER due to incarcerated hernia. Case was reviewed by Dr. Tiana Benito who defers admission to Hospitalist team due to comorbid conditions, however General Surgery will consult. Past Medical History/Comorbidities:   Ms. Cristhian Cordova  has a past medical history of Cirrhosis ([facility] and Diabetes ([facility] Ms. Cristhian Cordova  has a past surgical history that includes ir paracentesis abd w image (10/4/2021); ir paracentesis abd w image (10/28/2021); ir paracentesis abd w image (11/22/2021); ir paracentesis abd w image (1/31/2022); ir paracentesis abd w image (3/16/2022); ir paracentesis abd w image (3/25/2022); and ir paracentesis abd w image (4/6/2022). Social History/Living Environment:   Home Environment: Private residence  # Steps to Enter: 5  Rails to Enter: Yes  Hand Rails : Left  One/Two Story Residence: One story  Living Alone: No  Support Systems: Other Family Member(s)  Patient Expects to be Discharged to[de-identified] Home  Current DME Used/Available at Home: None  Tub or Shower Type: Shower  Prior Level of Function/Work/Activity:  Ambulates without device but uses furniture and has falls. Number of Personal Factors/Comorbidities that affect the Plan of Care: 1-2: MODERATE COMPLEXITY   EXAMINATION:   Most Recent Physical Functioning:   Gross Assessment:  AROM: Generally decreased, functional  Strength: Generally decreased, functional  Coordination: Generally decreased, functional               Posture:     Balance:  Sitting: Intact  Standing: Pull to stand; With support Bed Mobility:  Supine to Sit: Moderate assistance  Sit to Supine:  Moderate assistance  Scooting: Minimum assistance  Wheelchair Mobility:     Transfers:  Sit to Stand: Contact guard assistance;Minimum assistance  Stand to Sit: Contact guard assistance  Gait:     Speed/Starla: Delayed  Step Length: Left shortened;Right shortened  Gait Abnormalities: Decreased step clearance  Distance (ft): 15 Feet (ft)  Assistive Device: Walker, rolling  Ambulation - Level of Assistance: Contact guard assistance;Minimal assistance  Interventions: Safety awareness training;Verbal cues  Home Environment: Private residence  Home Situation  Home Environment: Private residence  # Steps to Enter: 5  Rails to Enter: Yes  Hand Rails : Left  One/Two Story Residence: One story  Living Alone: No  Support Systems: Other Family Member(s)  Patient Expects to be Discharged to[de-identified] Home  Current DME Used/Available at Home: None  Tub or Shower Type: Shower      Body Structures Involved:  Muscles Body Functions Affected: Movement Related Activities and Participation Affected: Mobility   Number of elements that affect the Plan of Care: 3: MODERATE COMPLEXITY   CLINICAL PRESENTATION:   Presentation: Stable and uncomplicated: LOW COMPLEXITY   CLINICAL DECISION MAKING:   [facility] 6 Clicks   Basic Mobility Inpatient Short Form  How much difficulty does the patient currently have. .. Unable A Lot A Little None   1. Turning over in bed (including adjusting bedclothes, sheets and blankets)? [] 1   [x] 2   [] 3   [] 4   2. Sitting down on and standing up from a chair with arms ( e.g., wheelchair, bedside commode, etc.)   [] 1   [] 2   [x] 3   [] 4   3. Moving from lying on back to sitting on the side of the bed? [] 1   [x] 2   [] 3   [] 4   How much help from another person does the patient currently need. .. Total A Lot A Little None   4. Moving to and from a bed to a chair (including a wheelchair)? [] 1   [] 2   [x] 3   [] 4   5. Need to walk in hospital room? [] 1   [] 2   [x] 3   [] 4   6. Climbing 3-5 steps with a railing?    [] 1   [] 2   [] 3   [] 4 © 2007, Trustees of [address] [place] [address] [address] [id number], under license to [employer]. All rights reserved      Score:  Initial: 15 Most Recent: X (Date: -- )    Interpretation of Tool:  Represents activities that are increasingly more difficult (i.e. Bed mobility, Transfers, Gait). Medical Necessity:     Patient is expected to demonstrate progress in   strength, balance, and coordination   to   increase independence with mobility and increase safety. .  Reason for Services/Other Comments:  Patient continues to require skilled intervention due to   Decreased strength, balance, and mobility. .   Use of outcome tool(s) and clinical judgement create a POC that gives a: Clear prediction of patient's progress: LOW COMPLEXITY            TREATMENT:   (In addition to Assessment/Re-Assessment sessions the following treatments were rendered)   Pre-treatment Symptoms/Complaints:  patient agreeable. Pain: Initial:   Pain Intensity 1: 3  Post Session:  3     Therapeutic Activity: (    10 minutes): Therapeutic activities including Bed transfers, Ambulation on level ground, and sitting and standing tolerance to improve mobility, strength, balance, and coordination. Required minimal Safety awareness training;Verbal cues to promote static and dynamic balance in standing. assessment    Braces/Orthotics/Lines/Etc:   IV  O2 Device: None (Room air)  Treatment/Session Assessment:    Response to Treatment:  participated well and should progress well. Interdisciplinary Collaboration:   Physical Therapist  Registered Nurse  Certified Nursing Assistant/Patient Care Technician  After treatment position/precautions:   Supine in bed  Bed/Chair-wheels locked  Call light within reach   Compliance with Program/Exercises: Will assess as treatment progresses  Recommendations/Intent for next treatment session: \"Next visit will focus on advancements to more challenging activities and reduction in assistance provided\".   Total Treatment Duration:  PT Patient Time In/Time Out  Time In: 1445  Time Out: Crystal [id number], PT

## 2022-04-09 NOTE — PROGRESS NOTES
Admit Date: 2022    POD 2     Procedure:  Procedure(s):  INCARCERATED INCISIONAL HERNIA    Subjective:     Patient reports pain is controlled, no new issues, she does not have any flatus or bowel movements as of yet    Objective:     Visit Vitals  /60 (BP 1 Location: Left upper arm, BP Patient Position: Supine)   Pulse 79   Temp 97.8 °F (36.6 °C)   Resp 16   SpO2 100%       Temp (24hrs), Av.5 °F (36.9 °C), Min:97.8 °F (36.6 °C), Max:99 °F (37.2 °C)  . I&O reviewed as documented. Physical Exam:    General-in no distress. Abdomen- Wound clean, dry, no drainage and pt has mild appropriately distributed tenderness along incision.  (+)distention and probably some ascites accumuluation.      Her abdomen is distended and dull to percussion      Labs:   Recent Results (from the past 24 hour(s))   GLUCOSE, POC    Collection Time: 22 11:48 AM   Result Value Ref Range    Glucose (POC) 225 (H) 65 - 100 mg/dL    Performed by Zapier    GLUCOSE, POC    Collection Time: 22  6:00 PM   Result Value Ref Range    Glucose (POC) 241 (H) 65 - 100 mg/dL    Performed by Zapier    GLUCOSE, POC    Collection Time: 22  9:23 PM   Result Value Ref Range    Glucose (POC) 256 (H) 65 - 100 mg/dL    Performed by Kruglea    GLUCOSE, POC    Collection Time: 22 11:17 PM   Result Value Ref Range    Glucose (POC) 230 (H) 65 - 100 mg/dL    Performed by MuseAmi    CBC WITH AUTOMATED DIFF    Collection Time: 22  4:48 AM   Result Value Ref Range    WBC 5.2 4.3 - 11.1 K/uL    RBC 3.80 (L) 4.05 - 5.2 M/uL    HGB 10.9 (L) 11.7 - 15.4 g/dL    HCT 32.9 (L) 35.8 - 46.3 %    MCV 86.6 79.6 - 97.8 FL    MCH 28.7 26.1 - 32.9 PG    MCHC 33.1 31.4 - 35.0 g/dL    RDW 14.6 11.9 - 14.6 %    PLATELET 694 (L) 440 - 450 K/uL    MPV 10.2 9.4 - 12.3 FL    ABSOLUTE NRBC 0.00 0.0 - 0.2 K/uL    DF AUTOMATED      NEUTROPHILS 63 43 - 78 %    LYMPHOCYTES 16 13 - 44 %    MONOCYTES 16 (H) 4.0 - 12.0 %    EOSINOPHILS 4 0.5 - 7.8 %    BASOPHILS 1 0.0 - 2.0 %    IMMATURE GRANULOCYTES 1 0.0 - 5.0 %    ABS. NEUTROPHILS 3.2 1.7 - 8.2 K/UL    ABS. LYMPHOCYTES 0.8 0.5 - 4.6 K/UL    ABS. MONOCYTES 0.8 0.1 - 1.3 K/UL    ABS. EOSINOPHILS 0.2 0.0 - 0.8 K/UL    ABS. BASOPHILS 0.0 0.0 - 0.2 K/UL    ABS. IMM. GRANS. 0.1 0.0 - 0.5 K/UL   METABOLIC PANEL, COMPREHENSIVE    Collection Time: 04/09/22  4:48 AM   Result Value Ref Range    Sodium 135 (L) 136 - 145 mmol/L    Potassium 4.2 3.5 - 5.1 mmol/L    Chloride 104 98 - 107 mmol/L    CO2 25 21 - 32 mmol/L    Anion gap 6 (L) 7 - 16 mmol/L    Glucose 188 (H) 65 - 100 mg/dL    BUN 8 8 - 23 MG/DL    Creatinine 0.73 0.6 - 1.0 MG/DL    GFR est AA >60 >60 ml/min/1.73m2    GFR est non-AA >60 >60 ml/min/1.73m2    Calcium 8.0 (L) 8.3 - 10.4 MG/DL    Bilirubin, total 1.2 (H) 0.2 - 1.1 MG/DL    ALT (SGPT) 22 12 - 65 U/L    AST (SGOT) 36 15 - 37 U/L    Alk.  phosphatase 148 (H) 50 - 130 U/L    Protein, total 5.9 (L) 6.3 - 8.2 g/dL    Albumin 2.0 (L) 3.2 - 4.6 g/dL    Globulin 3.9 (H) 2.3 - 3.5 g/dL    A-G Ratio 0.5 (L) 1.2 - 3.5     GLUCOSE, POC    Collection Time: 04/09/22  5:59 AM   Result Value Ref Range    Glucose (POC) 211 (H) 65 - 100 mg/dL    Performed by EdwinLuis             Assessment:     Principal Problem:    Incarcerated umbilical hernia (4/7/1401)    Active Problems:    [facility] (11/19/2013)      Dyslipidemia (6/30/2020)      Esophageal varices determined by endoscopy ([facility] (12/8/2021)      Liver 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 (nonalcoholic steatohepatitis) ([facility] (4/26/2021)      Hiatal hernia (12/8/2021)      RIA (obstructive sleep apnea) (8/9/2012)      UTI (urinary tract infection) (9/29/2020)      SBO (small bowel obstruction) ([facility] (4/6/2022)      Intractable nausea and vomiting (4/7/2022)      CAD (coronary artery disease) (4/7/2022)          Plan/Recommendations/Medical Decision Making:     Keep diet at clears due to distention    Await better bowel function before advancing her diet

## 2022-04-09 NOTE — PROGRESS NOTES
Hospitalist Progress Note   Admit Date:  2022  9:08 PM   LOS: 3 days   Name:  Dedrick Negron   Age:  66 y.o. Sex:  female  :  1950   MRN:  [id number]     Chief Complaint: Abdominal pain with N/V  Reason(s) for Admission: Incarcerated umbilical hernia [N35.3]  SBO (small bowel obstruction) ([facility] [K56.609]  UTI (urinary tract infection) [N39.0]     Hospital Course & Interval History:   66 y.o. female with a PMH of CARABALLO cirrhosis who presented to ED with abdominal pain after undergoing paracentesis. On , she got the paracentesis, had dinner and then she became ill with nausea and vomiting on the drive home. Upon ER evaluation, patient found to have an incarcerated umbilical hernia. Unsuccessful bedside reduction x3.  CT A/P was obtained which shows SBO at level of umbilical hernia. UA is also indicative of infection. On-call General Surgery was consulted by ER due to incarcerated hernia. She was admitted and NGT was placed. She went to the OR on  for surgical hernia reduction and mesh placement. Subjective (22): Much improvedthis AM. Abdominal pain 5/10. Denies N/V/D. States she is passing gas. Confusion improved. Denies CP/SOB. Denies F/C. Review of systems negative except stated above.     Assessment & Plan:     Principal Problem:    Incarcerated umbilical hernia (5178)  -  CTAP with incarcerated ventral hernia with SBO  -  Went to OR for hernia repair  -  Abdominal pain minimal, tolerating CLD, no flatus/BM  -  Abdominal pain minimal, passing gas, no N/V  - Unable to be reduced after multiple attempts in ER  - Pain control  - Nausea control  - Gen Surg following    Active Problems:    SBO (small bowel obstruction) ([facility] (2022)  - Due to #1  -  Went to OR for hernia repair  -  Abdominal pain minimal, tolerating CLD, no flatus/BM  -  Abdominal pain minimal, passing gas, no N/V  - Pain control  - Nausea control  - Gen Surg to take to OR this afternoon      UTI (urinary tract infection) (9/29/2020)  - UA consistent with UTI  - Continue Ceftriaxone  - Prelim urine culture with GNR  - Await final urine culture results      Intractable nausea and vomiting (4/7/2022)  - Due to #1 & #2  - Resolved  - Zofran PRN      Type II diabetes mellitus ([facility] (11/19/2013)  - A1C 8.0  - Glucose elevated today  - 4/8 Add Lantus 5U tonight  - 4/9 Increase Lantus to 10U  - Continue Humalog SSI      Liver 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 (nonalcoholic steatohepatitis) ([facility] (4/26/2021)  - S/P paracentesis on 4/6  - 4/7 s/p 3.2L removed in OR during surgery  - Continue to monitor      Esophageal varices determined by endoscopy ([facility] (12/8/2021)      Hiatal hernia (12/8/2021)      CAD (coronary artery disease) (4/7/2022)      Dyslipidemia (6/30/2020)        RIA (obstructive sleep apnea) (8/9/2012)    Diet:  ADULT DIET Clear Liquid  DVT PPx: SCDs  Code status: Full Code    Hospital Problems as of 4/9/2022 Date Reviewed: 3/21/2022          Codes Class Noted - Resolved POA    * (Principal) Incarcerated umbilical hernia [id number]: K42.0  ICD-9-CM: 552.1  4/6/2022 - Present Yes        SBO (small bowel obstruction) ([facility] ICD-10-CM: A49.752  ICD-9-CM: 560.9  4/6/2022 - Present Yes        Intractable nausea and vomiting ICD-10-CM: R11.2  ICD-9-CM: 536.2  4/7/2022 - Present Yes        UTI (urinary tract infection) ICD-10-CM: N39.0  ICD-9-CM: 599.0  9/29/2020 - Present Yes        Liver 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 (nonalcoholic steatohepatitis) ([facility] ICD-10-CM: K75.81, K74.60  ICD-9-CM: 571.8, 571.5  4/26/2021 - Present Unknown        Type II diabetes mellitus ([facility] ICD-10-CM: E11.9  ICD-9-CM: 250.00  11/19/2013 - Present Yes        CAD (coronary artery disease) ICD-10-CM: I25.10  ICD-9-CM: 414.00  4/7/2022 - Present Yes        Esophageal varices determined by endoscopy ([facility] ICD-10-CM: I85.00  ICD-9-CM: 456.1  12/8/2021 - Present Yes        Hiatal hernia ICD-10-CM: K44.9  ICD-9-CM: 553.3 12/8/2021 - Present Yes        Dyslipidemia ICD-10-CM: E78.5  ICD-9-CM: 272.4  6/30/2020 - Present Yes        RIA (obstructive sleep apnea) ICD-10-CM: G47.33  ICD-9-CM: 327.23  8/9/2012 - Present Yes              Objective:     Patient Vitals for the past 24 hrs:   Temp Pulse Resp BP SpO2   04/09/22 0725 97.8 °F (36.6 °C) 79 16 121/60 100 %   04/09/22 0334 98.5 °F (36.9 °C) 85 16 130/64 96 %   04/08/22 2323 98.1 °F (36.7 °C) 87 18 134/63 97 %   04/08/22 1958 99 °F (37.2 °C) 83 16 110/72 94 %   04/08/22 1629 98.9 °F (37.2 °C) 88 16 (!) 126/58 97 %   04/08/22 1109 98.3 °F (36.8 °C) 87 16 (!) 107/58 95 %     Oxygen Therapy  O2 Sat (%): 100 % (04/09/22 0725)  Pulse via Oximetry: 83 beats per minute (04/08/22 0749)  O2 Device: None (Room air) (04/09/22 0725)  Skin Assessment: Clean, dry, & intact (04/08/22 0749)  Skin Protection for O2 Device: N/A (04/08/22 0749)  O2 Flow Rate (L/min): 1 l/min (04/08/22 0835)    Estimated body mass index is 30.04 kg/m² as calculated from the following:    Height as of 3/25/22: 5' 4\" (1.626 m). Weight as of 3/25/22: 79.4 kg (175 lb). Intake/Output Summary (Last 24 hours) at 4/9/2022 0907  Last data filed at 4/9/2022 0334  Gross per 24 hour   Intake    Output 1250 ml   Net -1250 ml         Physical Exam:   General:    Well nourished. No overt distress  Head:  Normocephalic, atraumatic  Eyes:  Sclerae appear normal.  Pupils equally round. ENT:  Nares appear normal, no drainage. Moist oral mucosa  Neck:  No restricted ROM. Trachea midline   CV:   RRR. No m/r/g. No jugular venous distension. Lungs:   CTAB. No wheezing, rhonchi, or rales. Respirations even, unlabored  Abdomen: Bowel sounds present. Soft, nontender, mildly distended. Extremities: No cyanosis or clubbing. No edema  Skin:     No rashes and normal coloration. Warm and dry. Neuro: Cranial nerves II-XII grossly intact. Sensation intact  Psych: Normal mood and affect.   Alert and oriented x3 but mildly confused, improved    I have reviewed ordered lab tests and independently visualized imaging below:    Last 24hr Labs:  Recent Results (from the past 24 hour(s))   GLUCOSE, POC    Collection Time: 04/08/22 11:48 AM   Result Value Ref Range    Glucose (POC) 225 (H) 65 - 100 mg/dL    Performed by [employer]    GLUCOSE, POC    Collection Time: 04/08/22  6:00 PM   Result Value Ref Range    Glucose (POC) 241 (H) 65 - 100 mg/dL    Performed by [employer]    GLUCOSE, POC    Collection Time: 04/08/22  9:23 PM   Result Value Ref Range    Glucose (POC) 256 (H) 65 - 100 mg/dL    Performed by [employer]    GLUCOSE, POC    Collection Time: 04/08/22 11:17 PM   Result Value Ref Range    Glucose (POC) 230 (H) 65 - 100 mg/dL    Performed by [email or web address]    CBC WITH AUTOMATED DIFF    Collection Time: 04/09/22  4:48 AM   Result Value Ref Range    WBC 5.2 4.3 - 11.1 K/uL    RBC 3.80 (L) 4.05 - 5.2 M/uL    HGB 10.9 (L) 11.7 - 15.4 g/dL    HCT 32.9 (L) 35.8 - 46.3 %    MCV 86.6 79.6 - 97.8 FL    MCH 28.7 26.1 - 32.9 PG    MCHC 33.1 31.4 - 35.0 g/dL    RDW 14.6 11.9 - 14.6 %    PLATELET 408 (L) 647 - 450 K/uL    MPV 10.2 9.4 - 12.3 FL    ABSOLUTE NRBC 0.00 0.0 - 0.2 K/uL    DF AUTOMATED      NEUTROPHILS 63 43 - 78 %    LYMPHOCYTES 16 13 - 44 %    MONOCYTES 16 (H) 4.0 - 12.0 %    EOSINOPHILS 4 0.5 - 7.8 %    BASOPHILS 1 0.0 - 2.0 %    IMMATURE GRANULOCYTES 1 0.0 - 5.0 %    ABS. NEUTROPHILS 3.2 1.7 - 8.2 K/UL    ABS. LYMPHOCYTES 0.8 0.5 - 4.6 K/UL    ABS. MONOCYTES 0.8 0.1 - 1.3 K/UL    ABS. EOSINOPHILS 0.2 0.0 - 0.8 K/UL    ABS. BASOPHILS 0.0 0.0 - 0.2 K/UL    ABS. IMM.  GRANS. 0.1 0.0 - 0.5 K/UL   METABOLIC PANEL, COMPREHENSIVE    Collection Time: 04/09/22  4:48 AM   Result Value Ref Range    Sodium 135 (L) 136 - 145 mmol/L    Potassium 4.2 3.5 - 5.1 mmol/L    Chloride 104 98 - 107 mmol/L    CO2 25 21 - 32 mmol/L    Anion gap 6 (L) 7 - 16 mmol/L    Glucose 188 (H) 65 - 100 mg/dL    BUN 8 8 - 23 MG/DL    Creatinine 0. 73 0.6 - 1.0 MG/DL    GFR est AA >60 >60 ml/min/1.73m2    GFR est non-AA >60 >60 ml/min/1.73m2    Calcium 8.0 (L) 8.3 - 10.4 MG/DL    Bilirubin, total 1.2 (H) 0.2 - 1.1 MG/DL    ALT (SGPT) 22 12 - 65 U/L    AST (SGOT) 36 15 - 37 U/L    Alk. phosphatase 148 (H) 50 - 130 U/L    Protein, total 5.9 (L) 6.3 - 8.2 g/dL    Albumin 2.0 (L) 3.2 - 4.6 g/dL    Globulin 3.9 (H) 2.3 - 3.5 g/dL    A-G Ratio 0.5 (L) 1.2 - 3.5     GLUCOSE, POC    Collection Time: 04/09/22  5:59 AM   Result Value Ref Range    Glucose (POC) 211 (H) 65 - 100 mg/dL    Performed by [facility]        All Micro Results     Procedure Component Value Units Date/Time    CULTURE, URINE [[id number]]  (Abnormal) Collected: 04/06/22 2146    Order Status: Completed Specimen: Urine from Clean catch Updated: 04/09/22 0819     Special Requests: NO SPECIAL REQUESTS        Culture result:       >100,000 COLONIES/mL GRAM NEGATIVE RODS SUBCULTURE IN PROGRESS                  10,000 to 50,000 COLONIES/mL MIXED SKIN RADHA ISOLATED                  Susceptibility delayed due to mixed radha. SARS-CoV-2 Lab Results  \"Novel Coronavirus\" Test: No results found for: COV2NT   \"Emergent Disease\" Test: No results found for: EDPR  \"SARS-COV-2\" Test: No results found for: XGCOVT  \"[facility]ision Labs\" Test: No results found for: RSLT  Rapid Test: No results found for: COVR       Imaging:  No results found. No results found for this visit on 04/06/22.     Current Meds:  Current Facility-Administered Medications   Medication Dose Route Frequency    FLUoxetine (PROzac) capsule 40 mg  40 mg Oral DAILY    rifAXIMin (XIFAXAN) tablet 550 mg  550 mg Oral BID    insulin glargine (LANTUS) injection 5 Units  5 Units SubCUTAneous QHS    insulin lispro (HUMALOG) injection   SubCUTAneous Q6H    sodium chloride (NS) flush 5-40 mL  5-40 mL IntraVENous Q8H    sodium chloride (NS) flush 5-40 mL  5-40 mL IntraVENous PRN    acetaminophen (TYLENOL) tablet 650 mg  650 mg Oral Q6H PRN    Or    acetaminophen (TYLENOL) suppository 650 mg  650 mg Rectal Q6H PRN    polyethylene glycol (MIRALAX) packet 17 g  17 g Oral DAILY PRN    ondansetron (ZOFRAN ODT) tablet 4 mg  4 mg Oral Q8H PRN    Or    ondansetron (ZOFRAN) injection 4 mg  4 mg IntraVENous Q6H PRN    cefTRIAXone (ROCEPHIN) 1 g in 0.9% sodium chloride (MBP/ADV) 50 mL MBP  1 g IntraVENous Q24H    pantoprazole (PROTONIX) 40 mg in 0.9% sodium chloride 10 mL injection  40 mg IntraVENous DAILY    HYDROmorphone (DILAUDID) injection 0.5 mg  0.5 mg IntraVENous Q4H PRN    oxyCODONE IR (ROXICODONE) tablet 5-15 mg  5-15 mg Oral Q4H PRN    0.9% sodium chloride infusion  75 mL/hr IntraVENous CONTINUOUS     Facility-Administered Medications Ordered in Other Encounters   Medication Dose Route Frequency    lidocaine (XYLOCAINE) 20 mg/mL (2 %) injection  mg  1-10 mL IntraDERMal Multiple       Discharge Plan:     Home in 2-3 days    Signed By: Sumaya Lopes DO     April 9, 2022

## 2022-04-09 NOTE — PROGRESS NOTES
Problem: Surgical Pathway Post-Op Day 1  Goal: Activity/Safety  Outcome: Progressing Towards Goal  Goal: Diagnostic Test/Procedures  Outcome: Progressing Towards Goal  Goal: Nutrition/Diet  Outcome: Progressing Towards Goal  Goal: Medications  Outcome: Progressing Towards Goal  Goal: Respiratory  Outcome: Progressing Towards Goal

## 2022-04-10 LAB
ALBUMIN SERPL-MCNC: 1.8 G/DL (ref 3.2–4.6)
ALBUMIN/GLOB SERPL: 0.5 {RATIO} (ref 1.2–3.5)
ALP SERPL-CCNC: 160 U/L (ref 50–130)
ALT SERPL-CCNC: 23 U/L (ref 12–65)
ANION GAP SERPL CALC-SCNC: 6 MMOL/L (ref 7–16)
AST SERPL-CCNC: 36 U/L (ref 15–37)
BASOPHILS # BLD: 0 K/UL (ref 0–0.2)
BASOPHILS NFR BLD: 1 % (ref 0–2)
BILIRUB SERPL-MCNC: 1.1 MG/DL (ref 0.2–1.1)
BUN SERPL-MCNC: 6 MG/DL (ref 8–23)
CALCIUM SERPL-MCNC: 7.8 MG/DL (ref 8.3–10.4)
CHLORIDE SERPL-SCNC: 107 MMOL/L (ref 98–107)
CO2 SERPL-SCNC: 24 MMOL/L (ref 21–32)
CREAT SERPL-MCNC: 0.61 MG/DL (ref 0.6–1)
DIFFERENTIAL METHOD BLD: ABNORMAL
EOSINOPHIL # BLD: 0.2 K/UL (ref 0–0.8)
EOSINOPHIL NFR BLD: 4 % (ref 0.5–7.8)
ERYTHROCYTE [DISTWIDTH] IN BLOOD BY AUTOMATED COUNT: 14.6 % (ref 11.9–14.6)
GLOBULIN SER CALC-MCNC: 3.9 G/DL (ref 2.3–3.5)
GLUCOSE BLD STRIP.AUTO-MCNC: 209 MG/DL (ref 65–100)
GLUCOSE BLD STRIP.AUTO-MCNC: 213 MG/DL (ref 65–100)
GLUCOSE BLD STRIP.AUTO-MCNC: 215 MG/DL (ref 65–100)
GLUCOSE BLD STRIP.AUTO-MCNC: 215 MG/DL (ref 65–100)
GLUCOSE BLD STRIP.AUTO-MCNC: 290 MG/DL (ref 65–100)
GLUCOSE SERPL-MCNC: 206 MG/DL (ref 65–100)
HCT VFR BLD AUTO: 32.4 % (ref 35.8–46.3)
HGB BLD-MCNC: 10.9 G/DL (ref 11.7–15.4)
IMM GRANULOCYTES # BLD AUTO: 0.1 K/UL (ref 0–0.5)
IMM GRANULOCYTES NFR BLD AUTO: 2 % (ref 0–5)
LYMPHOCYTES # BLD: 0.6 K/UL (ref 0.5–4.6)
LYMPHOCYTES NFR BLD: 14 % (ref 13–44)
MCH RBC QN AUTO: 29.1 PG (ref 26.1–32.9)
MCHC RBC AUTO-ENTMCNC: 33.6 G/DL (ref 31.4–35)
MCV RBC AUTO: 86.4 FL (ref 79.6–97.8)
MONOCYTES # BLD: 0.6 K/UL (ref 0.1–1.3)
MONOCYTES NFR BLD: 14 % (ref 4–12)
NEUTS SEG # BLD: 3 K/UL (ref 1.7–8.2)
NEUTS SEG NFR BLD: 67 % (ref 43–78)
NRBC # BLD: 0 K/UL (ref 0–0.2)
PLATELET # BLD AUTO: 151 K/UL (ref 150–450)
PMV BLD AUTO: 9.9 FL (ref 9.4–12.3)
POTASSIUM SERPL-SCNC: 3.7 MMOL/L (ref 3.5–5.1)
PROT SERPL-MCNC: 5.7 G/DL (ref 6.3–8.2)
RBC # BLD AUTO: 3.75 M/UL (ref 4.05–5.2)
SERVICE CMNT-IMP: ABNORMAL
SODIUM SERPL-SCNC: 137 MMOL/L (ref 136–145)
WBC # BLD AUTO: 4.5 K/UL (ref 4.3–11.1)

## 2022-04-10 PROCEDURE — 97535 SELF CARE MNGMENT TRAINING: CPT

## 2022-04-10 PROCEDURE — 3331090001 HH PPS REVENUE CREDIT

## 2022-04-10 PROCEDURE — 74011250637 HC RX REV CODE- 250/637: Performed by: INTERNAL MEDICINE

## 2022-04-10 PROCEDURE — 74011636637 HC RX REV CODE- 636/637: Performed by: INTERNAL MEDICINE

## 2022-04-10 PROCEDURE — 3331090002 HH PPS REVENUE DEBIT

## 2022-04-10 PROCEDURE — 80053 COMPREHEN METABOLIC PANEL: CPT

## 2022-04-10 PROCEDURE — 74011250637 HC RX REV CODE- 250/637: Performed by: SURGERY

## 2022-04-10 PROCEDURE — 85025 COMPLETE CBC W/AUTO DIFF WBC: CPT

## 2022-04-10 PROCEDURE — 97165 OT EVAL LOW COMPLEX 30 MIN: CPT

## 2022-04-10 PROCEDURE — 97530 THERAPEUTIC ACTIVITIES: CPT

## 2022-04-10 PROCEDURE — 65270000029 HC RM PRIVATE

## 2022-04-10 PROCEDURE — 82962 GLUCOSE BLOOD TEST: CPT

## 2022-04-10 PROCEDURE — 36415 COLL VENOUS BLD VENIPUNCTURE: CPT

## 2022-04-10 PROCEDURE — 74011636637 HC RX REV CODE- 636/637: Performed by: FAMILY MEDICINE

## 2022-04-10 PROCEDURE — 74011000250 HC RX REV CODE- 250: Performed by: FAMILY MEDICINE

## 2022-04-10 RX ORDER — BUMETANIDE 1 MG/1
1 TABLET ORAL DAILY
Status: DISCONTINUED | OUTPATIENT
Start: 2022-04-10 | End: 2022-04-11 | Stop reason: HOSPADM

## 2022-04-10 RX ORDER — INSULIN LISPRO 100 [IU]/ML
INJECTION, SOLUTION INTRAVENOUS; SUBCUTANEOUS
Status: DISCONTINUED | OUTPATIENT
Start: 2022-04-10 | End: 2022-04-11 | Stop reason: HOSPADM

## 2022-04-10 RX ORDER — INSULIN GLARGINE 100 [IU]/ML
20 INJECTION, SOLUTION SUBCUTANEOUS
Status: DISCONTINUED | OUTPATIENT
Start: 2022-04-10 | End: 2022-04-11 | Stop reason: HOSPADM

## 2022-04-10 RX ORDER — MIDODRINE HYDROCHLORIDE 5 MG/1
5 TABLET ORAL
Status: DISCONTINUED | OUTPATIENT
Start: 2022-04-10 | End: 2022-04-11 | Stop reason: HOSPADM

## 2022-04-10 RX ADMIN — MIDODRINE HYDROCHLORIDE 5 MG: 5 TABLET ORAL at 18:02

## 2022-04-10 RX ADMIN — MIDODRINE HYDROCHLORIDE 5 MG: 5 TABLET ORAL at 11:50

## 2022-04-10 RX ADMIN — PANTOPRAZOLE SODIUM 40 MG: 40 TABLET, DELAYED RELEASE ORAL at 06:51

## 2022-04-10 RX ADMIN — RIFAXIMIN 550 MG: 550 TABLET ORAL at 18:02

## 2022-04-10 RX ADMIN — SODIUM CHLORIDE, PRESERVATIVE FREE 10 ML: 5 INJECTION INTRAVENOUS at 18:02

## 2022-04-10 RX ADMIN — SODIUM CHLORIDE, PRESERVATIVE FREE 10 ML: 5 INJECTION INTRAVENOUS at 06:51

## 2022-04-10 RX ADMIN — Medication 6 UNITS: at 11:50

## 2022-04-10 RX ADMIN — FLUOXETINE 40 MG: 20 CAPSULE ORAL at 11:50

## 2022-04-10 RX ADMIN — Medication 20 UNITS: at 22:53

## 2022-04-10 RX ADMIN — RIFAXIMIN 550 MG: 550 TABLET ORAL at 11:50

## 2022-04-10 RX ADMIN — BUMETANIDE 1 MG: 1 TABLET ORAL at 11:50

## 2022-04-10 RX ADMIN — Medication 4 UNITS: at 22:53

## 2022-04-10 RX ADMIN — Medication 4 UNITS: at 06:50

## 2022-04-10 RX ADMIN — Medication 4 UNITS: at 18:04

## 2022-04-10 RX ADMIN — SODIUM CHLORIDE, PRESERVATIVE FREE 10 ML: 5 INJECTION INTRAVENOUS at 22:53

## 2022-04-10 RX ADMIN — OXYCODONE HYDROCHLORIDE 15 MG: 5 TABLET ORAL at 19:58

## 2022-04-10 NOTE — PROGRESS NOTES
Admit Date: 2022    POD 3    Procedure:  Procedure(s):  INCARCERATED INCISIONAL HERNIA    Subjective:     Patient reports pain is well controlled, she is feeling significantly better and looks better. She is on full liquids and tolerating them well she is having gas and multiple bowel movements    Objective:     Visit Vitals  /76 (BP 1 Location: Left upper arm, BP Patient Position: Supine)   Pulse 86   Temp 98 °F (36.7 °C)   Resp 12   SpO2 96%       Temp (24hrs), Av °F (36.7 °C), Min:97.5 °F (36.4 °C), Max:98.5 °F (36.9 °C)  . I&O reviewed as documented. Physical Exam:   General-in no distress. Abdomen- Wound clean, dry, no drainage and pt has mild appropriately distributed tenderness along incision. Her abdomen is distended and dull to percussion      Labs:   Recent Results (from the past 24 hour(s))   GLUCOSE, POC    Collection Time: 22 12:20 PM   Result Value Ref Range    Glucose (POC) 297 (H) 65 - 100 mg/dL    Performed by [employer]    GLUCOSE, POC    Collection Time: 22  6:04 PM   Result Value Ref Range    Glucose (POC) 204 (H) 65 - 100 mg/dL    Performed by [employer]    GLUCOSE, POC    Collection Time: 22 11:13 PM   Result Value Ref Range    Glucose (POC) 189 (H) 65 - 100 mg/dL    Performed by [facility] [facility]    CBC WITH AUTOMATED DIFF    Collection Time: 04/10/22  5:11 AM   Result Value Ref Range    WBC 4.5 4.3 - 11.1 K/uL    RBC 3.75 (L) 4.05 - 5.2 M/uL    HGB 10.9 (L) 11.7 - 15.4 g/dL    HCT 32.4 (L) 35.8 - 46.3 %    MCV 86.4 79.6 - 97.8 FL    MCH 29.1 26.1 - 32.9 PG    MCHC 33.6 31.4 - 35.0 g/dL    RDW 14.6 11.9 - 14.6 %    PLATELET 689 526 - 647 K/uL    MPV 9.9 9.4 - 12.3 FL    ABSOLUTE NRBC 0.00 0.0 - 0.2 K/uL    DF AUTOMATED      NEUTROPHILS 67 43 - 78 %    LYMPHOCYTES 14 13 - 44 %    MONOCYTES 14 (H) 4.0 - 12.0 %    EOSINOPHILS 4 0.5 - 7.8 %    BASOPHILS 1 0.0 - 2.0 %    IMMATURE GRANULOCYTES 2 0.0 - 5.0 %    ABS.  NEUTROPHILS 3.0 1.7 - 8.2 K/UL    ABS. LYMPHOCYTES 0.6 0.5 - 4.6 K/UL    ABS. MONOCYTES 0.6 0.1 - 1.3 K/UL    ABS. EOSINOPHILS 0.2 0.0 - 0.8 K/UL    ABS. BASOPHILS 0.0 0.0 - 0.2 K/UL    ABS. IMM. GRANS. 0.1 0.0 - 0.5 K/UL   METABOLIC PANEL, COMPREHENSIVE    Collection Time: 04/10/22  5:11 AM   Result Value Ref Range    Sodium 137 136 - 145 mmol/L    Potassium 3.7 3.5 - 5.1 mmol/L    Chloride 107 98 - 107 mmol/L    CO2 24 21 - 32 mmol/L    Anion gap 6 (L) 7 - 16 mmol/L    Glucose 206 (H) 65 - 100 mg/dL    BUN 6 (L) 8 - 23 MG/DL    Creatinine 0.61 0.6 - 1.0 MG/DL    GFR est AA >60 >60 ml/min/1.73m2    GFR est non-AA >60 >60 ml/min/1.73m2    Calcium 7.8 (L) 8.3 - 10.4 MG/DL    Bilirubin, total 1.1 0.2 - 1.1 MG/DL    ALT (SGPT) 23 12 - 65 U/L    AST (SGOT) 36 15 - 37 U/L    Alk.  phosphatase 160 (H) 50 - 130 U/L    Protein, total 5.7 (L) 6.3 - 8.2 g/dL    Albumin 1.8 (L) 3.2 - 4.6 g/dL    Globulin 3.9 (H) 2.3 - 3.5 g/dL    A-G Ratio 0.5 (L) 1.2 - 3.5     GLUCOSE, POC    Collection Time: 04/10/22  6:43 AM   Result Value Ref Range    Glucose (POC) 215 (H) 65 - 100 mg/dL    Performed by [facility]             Assessment:     Principal Problem:    Incarcerated umbilical hernia (9/6/9823)    Active Problems:    Type II diabetes mellitus ([facility] (11/19/2013)      Dyslipidemia (6/30/2020)      [facility] (12/8/2021)      Liver 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 (nonalcoholic steatohepatitis) ([facility] (4/26/2021)      Hiatal hernia (12/8/2021)      RIA (obstructive sleep apnea) (8/9/2012)      UTI (urinary tract infection) (9/29/2020)      SBO [facility] obstruction) [facility] (4/6/2022)      Intractable nausea and vomiting (4/7/2022)      CAD (coronary artery disease) (4/7/2022)          Plan/Recommendations/Medical Decision Making:     Patient is cleared to advance her diet    He could be discharged home from a surgical standpoint    Will sign off, please call for any further issues

## 2022-04-10 NOTE — PROGRESS NOTES
Problem: Self Care Deficits Care Plan (Adult)  Goal: *Acute Goals and Plan of Care (Insert Text)  Outcome: Progressing Towards Goal  Note: 1. Patient will complete lower body bathing and dressing with INDEPENDENCE and adaptive equipment as needed. 2. Patient will complete toileting with INDEPENDENCE. 3. Patient will tolerate 30 minutes of OT treatment with 1-2 rest breaks to increase activity tolerance for ADLs. 4. Patient will complete functional transfers with MODIFIED INDEPENDENCE and adaptive equipment as needed. Timeframe: 7 visits       OCCUPATIONAL THERAPY: Initial Assessment, Daily Note, and AM 4/10/2022  INPATIENT: OT Visit Days: 1  [place] / [place] / Product Type: Medicare /      NAME/AGE/GENDER: Jazmin Fried is a 66 y.o. female   PRIMARY DIAGNOSIS:  Incarcerated umbilical hernia [J06.3]  [facility] ([facility] [K56.609]  UTI (urinary tract infection) [N39.0] Incarcerated umbilical hernia Incarcerated umbilical hernia  Procedure(s) (LRB):  INCARCERATED INCISIONAL HERNIA (N/A)  3 Days Post-Op  ICD-10: Treatment Diagnosis:    Generalized Muscle Weakness (M62.81)  Other lack of cordination (R27.8)  Difficulty in walking, Not elsewhere classified (R26.2)  History of falling (Z91.81)   Precautions/Allergies:   Patient has no known allergies. ASSESSMENT:     Ms. Katelynn Castillo presents with the above diagnoses and overall deficits in strength, activity tolerance, functional mobility and ADL performance. Pt will benefit from skilled acute OT to address these deficits identified. At baseline, she lives with her sister and brother-in-law who assist her as needed. She reports independence with ADLs prior to hospital stay. Today, she is able to perform functional mobility in room and hallway, LB dressing, toileting at standard commode, and grooming at sink all with assistance as charted below. Anticipate HH services upon discharge.      This section established at most recent assessment   PROBLEM LIST (Impairments causing functional limitations):  Decreased Strength  Decreased ADL/Functional Activities  Decreased Transfer Abilities  Decreased Ambulation Ability/Technique  Decreased Balance  Decreased Activity Tolerance   INTERVENTIONS PLANNED: (Benefits and precautions of occupational therapy have been discussed with the patient.)  Activities of daily living training  Adaptive equipment training  Neuromuscular re-eduation  Therapeutic activity  Therapeutic exercise     TREATMENT PLAN: Frequency/Duration: Follow patient 3x/week to address above goals. Rehabilitation Potential For Stated Goals: Good     REHAB RECOMMENDATIONS (at time of discharge pending progress):    Placement: It is my opinion, based on this patient's performance to date, that Ms. Harmeet Mosley may benefit from [address] after discharge due to the functional deficits listed above that are likely to improve with skilled rehabilitation because he/she has multiple medical issues that affect his/her functional mobility in the community. Equipment:   None at this time              OCCUPATIONAL PROFILE AND HISTORY:   History of Present Injury/Illness (Reason for Referral):  See H&P  Past Medical History/Comorbidities:   Ms. Harmeet Mosley  has a past medical history of Cirrhosis ([facility] and Diabetes ([facility] Ms. Harmeet Mosley  has a past surgical history that includes ir paracentesis abd w image (10/4/2021); ir paracentesis abd w image (10/28/2021); ir paracentesis abd w image (11/22/2021); ir paracentesis abd w image (1/31/2022); ir paracentesis abd w image (3/16/2022); ir paracentesis abd w image (3/25/2022); and ir paracentesis abd w image (4/6/2022).   Social History/Living Environment:   Home Environment: Private residence  # Steps to Enter: 5  Rails to Enter: Yes  Hand Rails : Left  One/Two Story Residence: One story  Living Alone: No  Support Systems: Child(chad)  Patient Expects to be Discharged to[de-identified] Home with home health  Current DME Used/Available at Home: None  Tub or Shower Type: Shower  Prior Level of Function/Work/Activity:  Independent (occasional assistance from sister), lives with sister and bro-in-law     Number of Personal Factors/Comorbidities that affect the Plan of Care: Brief history (0):  LOW COMPLEXITY   ASSESSMENT OF OCCUPATIONAL PERFORMANCE[de-identified]   Activities of Daily Living:   Basic ADLs (From Assessment) Complex ADLs (From Assessment)   Feeding: Independent  Oral Facial Hygiene/Grooming: Supervision  Bathing: Minimum assistance  Upper Body Dressing: Setup  Lower Body Dressing: Minimum assistance  Toileting: Minimum assistance (for thorough bowel hygiene)     Grooming/Bathing/Dressing Activities of Daily Living     Cognitive Retraining  Safety/Judgement: Awareness of environment                 Functional Transfers  Bathroom Mobility: Stand-by assistance  Toilet Transfer : Contact guard assistance  Shower Transfer: Contact guard assistance     Bed/Mat Mobility  Sit to Stand: Stand-by assistance  Stand to Sit: Stand-by assistance  Bed to Chair: Stand-by assistance     Most Recent Physical Functioning:   Gross Assessment:                  Posture:     Balance:  Sitting: Intact  Standing: With support Bed Mobility:     Wheelchair Mobility:     Transfers:  Sit to Stand: Stand-by assistance  Stand to Sit: Stand-by assistance  Bed to Chair: Stand-by assistance            Patient Vitals for the past 6 hrs:   BP BP Patient Position SpO2 Pulse   04/10/22 0809 131/76 Supine 96 % 86   04/10/22 1122 127/66 Supine 98 % 84       Mental Status  Neurologic State: Alert  Orientation Level: Oriented X4  Cognition: Appropriate decision making  Perception: Appears intact  Perseveration: No perseveration noted  Safety/Judgement: Awareness of environment                          Physical Skills Involved:  Balance  Strength  Activity Tolerance Cognitive Skills Affected (resulting in the inability to perform in a timely and safe manner):  [facility] Psychosocial Skills Affected:  Environmental Adaptation   Number of elements that affect the Plan of Care: 3-5:  MODERATE COMPLEXITY   CLINICAL DECISION MAKIN[address] [place] [address] AM-PAC 6 Clicks   Daily Activity Inpatient Short Form  How much help from another person does the patient currently need. .. Total A Lot A Little None   1. Putting on and taking off regular lower body clothing? [] 1   [] 2   [x] 3   [] 4   2. Bathing (including washing, rinsing, drying)? [] 1   [] 2   [x] 3   [] 4   3. Toileting, which includes using toilet, bedpan or urinal?   [] 1   [] 2   [x] 3   [] 4   4. Putting on and taking off regular upper body clothing? [] 1   [] 2   [] 3   [x] 4   5. Taking care of personal grooming such as brushing teeth? [] 1   [] 2   [] 3   [x] 4   6. Eating meals? [] 1   [] 2   [] 3   [x] 4   © , Trustees of [address] [place] [address], under license to [employer]. All rights reserved      Score:  Initial: 21 Most Recent: X (Date: -- )    Interpretation of Tool:  Represents activities that are increasingly more difficult (i.e. Bed mobility, Transfers, Gait). Medical Necessity:     Skilled intervention continues to be required due to the above deficits. Reason for Services/Other Comments:  Patient continues to require skilled intervention due to   Decreased activity tolerance for ADLs and mobility  . Use of outcome tool(s) and clinical judgement create a POC that gives a: LOW COMPLEXITY         TREATMENT:   (In addition to Assessment/Re-Assessment sessions the following treatments were rendered)     Pre-treatment Symptoms/Complaints:    Pain: Initial:   Pain Intensity 1: 0  Post Session:  0     Co-treatment was necessary to improve patient's ability to increase activity demands and ability to return to normal functional activity.     Self Care: (25): Procedure(s) (per grid) utilized to improve and/or restore self-care/home management as related to dressing, toileting, grooming, and functional household mobility and transfers . Required minimal verbal and tactile cueing to facilitate activities of daily living skills and compensatory activities. Evaluation complete    Braces/Orthotics/Lines/Etc:   O2 Device: None (Room air)  Treatment/Session Assessment:    Response to Treatment:  Good, up in chair  Interdisciplinary Collaboration:   Physical Therapist  Occupational Therapist  Registered Nurse  After treatment position/precautions:   Up in chair  Bed/Chair-wheels locked  Caregiver at bedside  Call light within reach  RN notified  Family at bedside   Compliance with Program/Exercises: Compliant all of the time, Will assess as treatment progresses. Recommendations/Intent for next treatment session: \"Next visit will focus on advancements to more challenging activities and reduction in assistance provided\".   Total Treatment Duration:  OT Patient Time In/Time Out  Time In: 0947  Time Out: [address], [place]

## 2022-04-10 NOTE — PROGRESS NOTES
Hospitalist Progress Note   Admit Date:  2022  9:08 PM   LOS: 4 days   Name:  Mahendra Chatterjee   Age:  66 y.o. Sex:  female  :  1950   MRN:  [id number]     Chief Complaint: Abdominal pain with N/V  Reason(s) for Admission: Incarcerated umbilical hernia [Q58.2]  SBO (small bowel obstruction) ([address] [K56.609]  UTI (urinary tract infection) [N39.0]     Hospital Course & Interval History:   66 y.o. female with a PMH of CARABALLO cirrhosis who presented to ED with abdominal pain after undergoing paracentesis. On , she got the paracentesis, had dinner and then she became ill with nausea and vomiting on the drive home. Upon ER evaluation, patient found to have an incarcerated umbilical hernia. Unsuccessful bedside reduction x3.  CT A/P was obtained which shows SBO at level of umbilical hernia. UA is also indicative of infection. On-call General Surgery was consulted by ER due to incarcerated hernia. She was admitted and NGT was placed. She went to the OR on  for surgical hernia reduction and mesh placement. Subjective (04/10/22): Sitting in chair this AM. Feels good per patient. Abdominal pain 3/10. Denies N/V/D. States she is passing gas and had BMs. Confusion improved. Denies CP/SOB. Denies F/C. Review of systems negative except stated above.     Assessment & Plan:     Principal Problem:    Incarcerated umbilical hernia (2827)  -  CTAP with incarcerated ventral hernia with SBO  -  Went to OR for hernia repair  -  Abdominal pain minimal, tolerating CLD, no flatus/BM  -  Abdominal pain minimal, passing gas, no N/V  - 4/10 Abdomen more distended, has had BMs, no N/V - advance CLD to FLD  - Unable to be reduced after multiple attempts in ER  - Pain control  - Nausea control  - Gen Surg following    Active Problems:    SBO (small bowel obstruction) ([address] (2022)  - Due to #1  -  Went to OR for hernia repair  -  Abdominal pain minimal, tolerating CLD, no flatus/BM  -  Abdominal pain minimal, passing gas, no N/V  - Pain control  - Nausea control  - Gen Surg following      UTI (urinary tract infection) (9/29/2020)  - UA consistent with UTI  - Continue Ceftriaxone  - Prelim urine culture with GNR  - Await final urine culture results      Intractable nausea and vomiting (4/7/2022)  - Due to #1 & #2  - Resolved  - Zofran PRN      Type II diabetes mellitus ([facility] (11/19/2013)  - A1C 8.0  - Glucose elevated today  - 4/8 Add Lantus 5U tonight  - 4/9 Increase Lantus to 10U  - 4/10 Increase Lantus to 20U since advancing diet  - Takes 42U at home  - Continue Humalog SSI      Liver 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 (nonalcoholic steatohepatitis) ([facility] (4/26/2021)  - S/P paracentesis on 4/6  - 4/7 s/p 3.2L removed in OR during surgery  - Continue to monitor      Esophageal varices determined by endoscopy ([facility] (12/8/2021)      Hiatal hernia (12/8/2021)      CAD (coronary artery disease) (4/7/2022)      Dyslipidemia (6/30/2020)        RIA (obstructive sleep apnea) (8/9/2012)    Diet:  ADULT DIET Full Liquid  DVT PPx: SCDs  Code status: Full Code    Hospital Problems as of 4/10/2022 Date Reviewed: 3/21/2022          Codes Class Noted - Resolved POA    * (Principal) Incarcerated umbilical hernia [id number]: K42.0  ICD-9-CM: 552.1  4/6/2022 - Present Yes        SBO (small bowel obstruction) ([facility] ICD-10-CM: X93.607  ICD-9-CM: 560.9  4/6/2022 - Present Yes        Intractable nausea and vomiting ICD-10-CM: R11.2  ICD-9-CM: 536.2  4/7/2022 - Present Yes        UTI (urinary tract infection) ICD-10-CM: N39.0  ICD-9-CM: 599.0  9/29/2020 - Present Yes        Liver 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 (nonalcoholic steatohepatitis) ([facility] ICD-10-CM: K75.81, K74.60  ICD-9-CM: 571.8, 571.5  4/26/2021 - Present Unknown        Type II diabetes mellitus ([facility] ICD-10-CM: E11.9  ICD-9-CM: 250.00  11/19/2013 - Present Yes        CAD (coronary artery disease) ICD-10-CM: I25.10  ICD-9-CM: 414.00  4/7/2022 - Present Yes Esophageal varices determined by endoscopy [facility]) ICD-10-CM: I85.00  ICD-9-CM: 456.1  12/8/2021 - Present Yes        Hiatal hernia ICD-10-CM: K44.9  ICD-9-CM: 553.3  12/8/2021 - Present Yes        Dyslipidemia ICD-10-CM: E78.5  ICD-9-CM: 272.4  6/30/2020 - Present Yes        RIA (obstructive sleep apnea) ICD-10-CM: G47.33  ICD-9-CM: 327.23  8/9/2012 - Present Yes              Objective:     Patient Vitals for the past 24 hrs:   Temp Pulse Resp BP SpO2   04/10/22 0809 98 °F (36.7 °C) 86 12 131/76 96 %   04/10/22 0353 97.5 °F (36.4 °C) 86 16 (!) 143/62 97 %   04/09/22 2313 98.2 °F (36.8 °C) 88 18 (!) 142/68 98 %   04/09/22 1918 98.5 °F (36.9 °C) 84 16 126/61 96 %   04/09/22 1539 97.9 °F (36.6 °C) 82 16 137/70 99 %   04/09/22 1148 97.9 °F (36.6 °C) 84 18 (!) 148/72 98 %     Oxygen Therapy  O2 Sat (%): 96 % (04/10/22 0809)  Pulse via Oximetry: 83 beats per minute (04/08/22 0749)  O2 Device: None (Room air) (04/10/22 0809)  Skin Assessment: Clean, dry, & intact (04/08/22 0749)  Skin Protection for O2 Device: N/A (04/08/22 0749)  O2 Flow Rate (L/min): 1 l/min (04/08/22 0835)    Estimated body mass index is 30.04 kg/m² as calculated from the following:    Height as of 3/25/22: 5' 4\" (1.626 m). Weight as of 3/25/22: 79.4 kg (175 lb). Intake/Output Summary (Last 24 hours) at 4/10/2022 0810  Last data filed at 4/10/2022 0353  Gross per 24 hour   Intake    Output 1750 ml   Net -1750 ml         Physical Exam:   General:    Well nourished. No overt distress  Head:  Normocephalic, atraumatic  Eyes:  Sclerae appear normal.  Pupils equally round. ENT:  Nares appear normal, no drainage. Moist oral mucosa  Neck:  No restricted ROM. Trachea midline   CV:   RRR. No m/r/g. No jugular venous distension. Lungs:   CTAB. No wheezing, rhonchi, or rales. Respirations even, unlabored  Abdomen: Bowel sounds present. Soft, nontender, mildly distended. Extremities: No cyanosis or clubbing.   No edema  Skin:     No rashes and normal coloration. Warm and dry. Neuro: Cranial nerves II-XII grossly intact. Sensation intact  Psych: Normal mood and affect. Alert and oriented x3 but mildly confused, improved    I have reviewed ordered lab tests and independently visualized imaging below:    Last 24hr Labs:  Recent Results (from the past 24 hour(s))   GLUCOSE, POC    Collection Time: 04/09/22 12:20 PM   Result Value Ref Range    Glucose (POC) 297 (H) 65 - 100 mg/dL    Performed by [facility]    GLUCOSE, POC    Collection Time: 04/09/22  6:04 PM   Result Value Ref Range    Glucose (POC) 204 (H) 65 - 100 mg/dL    Performed by [facility]    GLUCOSE, POC    Collection Time: 04/09/22 11:13 PM   Result Value Ref Range    Glucose (POC) 189 (H) 65 - 100 mg/dL    Performed by [facility]    CBC WITH AUTOMATED DIFF    Collection Time: 04/10/22  5:11 AM   Result Value Ref Range    WBC 4.5 4.3 - 11.1 K/uL    RBC 3.75 (L) 4.05 - 5.2 M/uL    HGB 10.9 (L) 11.7 - 15.4 g/dL    HCT 32.4 (L) 35.8 - 46.3 %    MCV 86.4 79.6 - 97.8 FL    MCH 29.1 26.1 - 32.9 PG    MCHC 33.6 31.4 - 35.0 g/dL    RDW 14.6 11.9 - 14.6 %    PLATELET 316 738 - 149 K/uL    MPV 9.9 9.4 - 12.3 FL    ABSOLUTE NRBC 0.00 0.0 - 0.2 K/uL    DF AUTOMATED      NEUTROPHILS 67 43 - 78 %    LYMPHOCYTES 14 13 - 44 %    MONOCYTES 14 (H) 4.0 - 12.0 %    EOSINOPHILS 4 0.5 - 7.8 %    BASOPHILS 1 0.0 - 2.0 %    IMMATURE GRANULOCYTES 2 0.0 - 5.0 %    ABS. NEUTROPHILS 3.0 1.7 - 8.2 K/UL    ABS. LYMPHOCYTES 0.6 0.5 - 4.6 K/UL    ABS. MONOCYTES 0.6 0.1 - 1.3 K/UL    ABS. EOSINOPHILS 0.2 0.0 - 0.8 K/UL    ABS. BASOPHILS 0.0 0.0 - 0.2 K/UL    ABS. IMM.  GRANS. 0.1 0.0 - 0.5 K/UL   METABOLIC PANEL, COMPREHENSIVE    Collection Time: 04/10/22  5:11 AM   Result Value Ref Range    Sodium 137 136 - 145 mmol/L    Potassium 3.7 3.5 - 5.1 mmol/L    Chloride 107 98 - 107 mmol/L    CO2 24 21 - 32 mmol/L    Anion gap 6 (L) 7 - 16 mmol/L    Glucose 206 (H) 65 - 100 mg/dL    BUN 6 (L) 8 - 23 MG/DL Creatinine 0.61 0.6 - 1.0 MG/DL    GFR est AA >60 >60 ml/min/1.73m2    GFR est non-AA >60 >60 ml/min/1.73m2    Calcium 7.8 (L) 8.3 - 10.4 MG/DL    Bilirubin, total 1.1 0.2 - 1.1 MG/DL    ALT (SGPT) 23 12 - 65 U/L    AST (SGOT) 36 15 - 37 U/L    Alk. phosphatase 160 (H) 50 - 130 U/L    Protein, total 5.7 (L) 6.3 - 8.2 g/dL    Albumin 1.8 (L) 3.2 - 4.6 g/dL    Globulin 3.9 (H) 2.3 - 3.5 g/dL    A-G Ratio 0.5 (L) 1.2 - 3.5     GLUCOSE, POC    Collection Time: 04/10/22  6:43 AM   Result Value Ref Range    Glucose (POC) 215 (H) 65 - 100 mg/dL    Performed by [facility]        All Micro Results     Procedure Component Value Units Date/Time    CULTURE, URINE [[id number]]  (Abnormal) Collected: 04/06/22 2146    Order Status: Completed Specimen: Urine from Clean catch Updated: 04/09/22 0819     Special Requests: NO SPECIAL REQUESTS        Culture result:       >100,000 COLONIES/mL GRAM NEGATIVE RODS SUBCULTURE IN PROGRESS                  10,000 to 50,000 COLONIES/mL MIXED SKIN ALEKSANDR ISOLATED                  Susceptibility delayed due to mixed aleksandr. SARS-CoV-2 Lab Results  \"Novel Coronavirus\" Test: No results found for: COV2NT   \"Emergent Disease\" Test: No results found for: EDPR  \"SARS-COV-2\" Test: No results found for: XGCOVT  \"Precision Labs\" Test: No results found for: RSLT  Rapid Test: No results found for: COVR       Imaging:  No results found. No results found for this visit on 04/06/22.     Current Meds:  Current Facility-Administered Medications   Medication Dose Route Frequency    insulin lispro (HUMALOG) injection   SubCUTAneous AC&HS    insulin glargine (LANTUS) injection 10 Units  10 Units SubCUTAneous QHS    pantoprazole (PROTONIX) tablet 40 mg  40 mg Oral ACB    FLUoxetine (PROzac) capsule 40 mg  40 mg Oral DAILY    rifAXIMin (XIFAXAN) tablet 550 mg  550 mg Oral BID    sodium chloride (NS) flush 5-40 mL  5-40 mL IntraVENous Q8H    sodium chloride (NS) flush 5-40 mL  5-40 mL IntraVENous PRN    acetaminophen (TYLENOL) tablet 650 mg  650 mg Oral Q6H PRN    Or    acetaminophen (TYLENOL) suppository 650 mg  650 mg Rectal Q6H PRN    ondansetron (ZOFRAN ODT) tablet 4 mg  4 mg Oral Q8H PRN    Or    ondansetron (ZOFRAN) injection 4 mg  4 mg IntraVENous Q6H PRN    HYDROmorphone (DILAUDID) injection 0.5 mg  0.5 mg IntraVENous Q4H PRN    oxyCODONE IR (ROXICODONE) tablet 5-15 mg  5-15 mg Oral Q4H PRN    0.9% sodium chloride infusion  75 mL/hr IntraVENous CONTINUOUS       Discharge Plan:     Home in 1-2 days    Signed By: Gina Hankins DO     April 10, 2022

## 2022-04-10 NOTE — PROGRESS NOTES
Problem: Mobility Impaired (Adult and Pediatric)  Goal: *Acute Goals and Plan of Care (Insert Text)  Outcome: Progressing Towards Goal  Note: STG:  (1.)Ms. Bryanna Petit will move from supine to sit and sit to supine  with MINIMAL ASSIST within 3 treatment day(s). (2.)Ms. Bryanna Petit will transfer from bed to chair and chair to bed with CONTACT GUARD ASSIST using the least restrictive device within 3 treatment day(s). met   (3.)Ms. Bryanna Petit will ambulate with CONTACT GUARD ASSIST for 50 feet with the least restrictive device within 3 treatment day(s). met    LTG:  (1.)Ms. Bryanna Petit will move from supine to sit and sit to supine  in bed with SUPERVISION within 7 treatment day(s). (2.)Ms. Bryanna Petit will transfer from bed to chair and chair to bed with SUPERVISION using the least restrictive device within 7 treatment day(s). (3.)Ms. Bryanna Petit will ambulate with SUPERVISION for 100 feet with the least restrictive device within 7 treatment day(s). 4.  Ms. Bryanna Petit will ambulate up/down 5 steps with left handrail and CGA within 7 treatment days. ________________________________________________________________________________________________      PHYSICAL THERAPY: Daily Note and AM 4/10/2022  INPATIENT: PT Visit Days : 2  [place] / [place] / Product Type: Medicare /       NAME/AGE/GENDER: Aleta Najjar is a 66 y.o. female   PRIMARY DIAGNOSIS: Incarcerated umbilical hernia [U55.8]  SBO (small bowel obstruction) ([facility] [K56.609]  UTI (urinary tract infection) [N39.0] Incarcerated umbilical hernia Incarcerated umbilical hernia  Procedure(s) (LRB):  INCARCERATED INCISIONAL HERNIA (N/A)  3 Days Post-Op  ICD-10: Treatment Diagnosis:    · Generalized Muscle Weakness (M62.81)  · Difficulty in walking, Not elsewhere classified (R26.2)   Precaution/Allergies:  Patient has no known allergies. ASSESSMENT:     Ms. Bryanna Petit presents with above diagnoses.   She demonstrates decreased strength, balance, and mobility, and decreased tolerance for activity. She would benefit from therapy to address these deficits while admitted. Patient does not use an assistive device at base but admits to relying on furniture for support and also admits to falls. Patient a bit hesitant about using a RW but stressed the need to avoid falls. Patient planning to return home at d/c (lives with sister) and feel this is reasonable with HH and use of RW for support/balance. Patient already up in chair this am.  Reports feeling well. Decreased assist for all mobility/transfers. Ambulated increased distance with RW and performed standing LE exercises with UE support at the walker. Patient certainly needs to be using a RW at all times. Sister present and reports patient has been told this numerous times but numerous people but she doesn't like to rely on anything-though she relies on her sister for support/assist.  Will continue therapy during admission and continue to recommend [place] at d/c. This section established at most recent assessment   PROBLEM LIST (Impairments causing functional limitations):  1. Decreased Strength  2. Decreased ADL/Functional Activities  3. Decreased Transfer Abilities  4. Decreased Ambulation Ability/Technique  5. Decreased Balance  6. Increased Pain  7. Decreased Activity Tolerance  8. Decreased Knowledge of Precautions   INTERVENTIONS PLANNED: (Benefits and precautions of physical therapy have been discussed with the patient.)  1. Balance Exercise  2. Bed Mobility  3. Family Education  4. Gait Training  5. Home Exercise Program (HEP)  6. Therapeutic Activites  7. Therapeutic Exercise/Strengthening  8. Transfer Training     TREATMENT PLAN: Frequency/Duration: daily for duration of hospital stay  Rehabilitation Potential For Stated Goals: Good     REHAB RECOMMENDATIONS (at time of discharge pending progress):    Placement: It is my opinion, based on this patient's performance to date, that Ms. Gaurav Slaughter may benefit from [address] after discharge due to the functional deficits listed above that are likely to improve with skilled rehabilitation because he/she has multiple medical issues that affect his/her functional mobility in the community. Equipment:    Walkers, Type: Rolling Walker              HISTORY:   History of Present Injury/Illness (Reason for Referral):  Dedrick Negron is a 66 y.o. female with medical history of cirrhosis who presented to ED with abdominal pain. Patient underwent paracentesis today without issue. She had dinner tonight, and reports that during the drive home, she became ill with nausea and vomiting. Denies overt fevers/chills. Upon ER evaluation, patient found to have an incarcerated umbilical hernia. Unsuccessful bedside reduction x3.  CT A/P was obtained which shows SBO at level of umbilical hernia. UA is also indicative of infection. On-call General Surgery was consulted by ER due to incarcerated hernia. Case was reviewed by Dr. Poncho Hines who defers admission to Hospitalist team due to comorbid conditions, however General Surgery will consult. Past Medical History/Comorbidities:   Ms. Thalia Cruz  has a past medical history of Cirrhosis ([facility] and Diabetes ([facility] Ms. Thalia Cruz  has a past surgical history that includes ir paracentesis abd w image (10/4/2021); ir paracentesis abd w image (10/28/2021); ir paracentesis abd w image (11/22/2021); ir paracentesis abd w image (1/31/2022); ir paracentesis abd w image (3/16/2022); ir paracentesis abd w image (3/25/2022); and ir paracentesis abd w image (4/6/2022).   Social History/Living Environment:   Home Environment: Private residence  # Steps to Enter: 5  Rails to Enter: Yes  Hand Rails : Left  One/Two Story Residence: One story  Living Alone: No  Support Systems: Child(chad)  Patient Expects to be Discharged to[de-identified] Home with home health  Current DME Used/Available at Home: None  Tub or Shower Type: Shower  Prior Level of Function/Work/Activity:  Ambulates without device but uses furniture and has falls. Number of Personal Factors/Comorbidities that affect the Plan of Care: 1-2: MODERATE COMPLEXITY   EXAMINATION:   Most Recent Physical Functioning:   Gross Assessment:  AROM: Generally decreased, functional  Strength: Generally decreased, functional  Coordination: Generally decreased, functional               Posture:     Balance:  Sitting: Intact  Standing: With support Bed Mobility:     Wheelchair Mobility:     Transfers:  Sit to Stand: Stand-by assistance  Stand to Sit: Stand-by assistance  Bed to Chair: Stand-by assistance  Gait:     Speed/Starla: Pace decreased (<100 feet/min)  Step Length: Left shortened;Right shortened  Distance (ft): 250 Feet (ft)  Assistive Device: Walker, rolling  Ambulation - Level of Assistance: Stand-by assistance  Interventions: Verbal cues  Home Environment: Private residence  Home Situation  Home Environment: Private residence  # Steps to Enter: 5  Rails to Enter: Yes  Hand Rails : Left  One/Two Story Residence: One story  Living Alone: No  Support Systems: Other Family Member(s)  Patient Expects to be Discharged to[de-identified] Home  Current DME Used/Available at Home: None  Tub or Shower Type: Shower      Body Structures Involved:  1. Muscles Body Functions Affected:  1. Movement Related Activities and Participation Affected:  1. Mobility   Number of elements that affect the Plan of Care: 3: MODERATE COMPLEXITY   CLINICAL PRESENTATION:   Presentation: Stable and uncomplicated: LOW COMPLEXITY   CLINICAL DECISION MAKING:   [employer] 6 Clicks   Basic Mobility Inpatient Short Form  How much difficulty does the patient currently have. .. Unable A Lot A Little None   1. Turning over in bed (including adjusting bedclothes, sheets and blankets)? [] 1   [x] 2   [] 3   [] 4   2.   Sitting down on and standing up from a chair with arms ( e.g., wheelchair, bedside commode, etc.)   [] 1   [] 2   [x] 3   [] 4   3. Moving from lying on back to sitting on the side of the bed? [] 1   [x] 2   [] 3   [] 4   How much help from another person does the patient currently need. .. Total A Lot A Little None   4. Moving to and from a bed to a chair (including a wheelchair)? [] 1   [] 2   [x] 3   [] 4   5. Need to walk in hospital room? [] 1   [] 2   [x] 3   [] 4   6. Climbing 3-5 steps with a railing? [] 1   [] 2   [] 3   [] 4   © 2007, Trustees of [facility] [employer], under license to [employer]. All rights reserved      Score:  Initial: 15 Most Recent: X (Date: -- )    Interpretation of Tool:  Represents activities that are increasingly more difficult (i.e. Bed mobility, Transfers, Gait). Medical Necessity:     · Patient is expected to demonstrate progress in   · strength, balance, and coordination  ·  to   · increase independence with mobility and increase safety. · .  Reason for Services/Other Comments:  · Patient continues to require skilled intervention due to   · Decreased strength, balance, and mobility. · .   Use of outcome tool(s) and clinical judgement create a POC that gives a: Clear prediction of patient's progress: LOW COMPLEXITY            TREATMENT:   (In addition to Assessment/Re-Assessment sessions the following treatments were rendered)   Pre-treatment Symptoms/Complaints:  patient agreeable. Pain: Initial:   Pain Intensity 1: 0  Post Session:  0     Therapeutic Activity: (    25 minutes): Therapeutic activities including Chair transfers, Ambulation on level ground, and standing LE exercises at walker to improve mobility, strength, balance, and coordination. Required minimal Verbal cues to promote static and dynamic balance in standing.         Date:  4/10/22 Date:   Date:     ACTIVITY/EXERCISE AM PM AM PM AM PM   Ambulation         Standing Heel Raises 10 B        Standing Toe Raises 10 B        Standing Mini Squats         Standing Hip Flexion 10 B        Standing Hip Abduction 10 B Standing Hamstring Curls                           B = bilateral; AA = active assistive; A = active; P = passive      Braces/Orthotics/Lines/Etc:   · O2 Device: None (Room air)  Treatment/Session Assessment:    · Response to Treatment:  participated well with improved mobility/transfers. · Interdisciplinary Collaboration:   o Physical Therapist  o Occupational Therapist  o Registered Nurse  · After treatment position/precautions:   o Up in chair  o Bed/Chair-wheels locked  o Call light within reach  o Family at bedside   · Compliance with Program/Exercises: Will assess as treatment progresses  · Recommendations/Intent for next treatment session: \"Next visit will focus on advancements to more challenging activities and reduction in assistance provided\".   Total Treatment Duration:  PT Patient Time In/Time Out  Time In: 0955  Time Out: 2230 Caitlin Olson PT

## 2022-04-11 VITALS
TEMPERATURE: 97.9 F | HEART RATE: 95 BPM | OXYGEN SATURATION: 98 % | SYSTOLIC BLOOD PRESSURE: 167 MMHG | RESPIRATION RATE: 16 BRPM | DIASTOLIC BLOOD PRESSURE: 76 MMHG

## 2022-04-11 PROBLEM — R11.2 INTRACTABLE NAUSEA AND VOMITING: Status: ACTIVE | Noted: 2022-04-07

## 2022-04-11 PROBLEM — K74.60 LIVER CIRRHOSIS SECONDARY TO NASH (NONALCOHOLIC STEATOHEPATITIS) (HCC): Status: ACTIVE | Noted: 2021-04-26

## 2022-04-11 PROBLEM — K56.609 SBO (SMALL BOWEL OBSTRUCTION) (HCC): Status: ACTIVE | Noted: 2022-04-06

## 2022-04-11 PROBLEM — I25.10 CAD (CORONARY ARTERY DISEASE): Status: ACTIVE | Noted: 2022-04-07

## 2022-04-11 PROBLEM — K42.0 INCARCERATED UMBILICAL HERNIA: Status: ACTIVE | Noted: 2022-04-06

## 2022-04-11 PROBLEM — K75.81 LIVER CIRRHOSIS SECONDARY TO NASH (NONALCOHOLIC STEATOHEPATITIS) (HCC): Status: ACTIVE | Noted: 2021-04-26

## 2022-04-11 LAB
ALBUMIN SERPL-MCNC: 2 G/DL (ref 3.2–4.6)
ALBUMIN/GLOB SERPL: 0.5 {RATIO} (ref 1.2–3.5)
ALP SERPL-CCNC: 171 U/L (ref 50–136)
ALT SERPL-CCNC: 26 U/L (ref 12–65)
ANION GAP SERPL CALC-SCNC: 6 MMOL/L (ref 7–16)
AST SERPL-CCNC: 37 U/L (ref 15–37)
BASOPHILS # BLD: 0.1 K/UL (ref 0–0.2)
BASOPHILS NFR BLD: 1 % (ref 0–2)
BILIRUB SERPL-MCNC: 1.1 MG/DL (ref 0.2–1.1)
BUN SERPL-MCNC: 5 MG/DL (ref 8–23)
CALCIUM SERPL-MCNC: 8.1 MG/DL (ref 8.3–10.4)
CHLORIDE SERPL-SCNC: 104 MMOL/L (ref 98–107)
CO2 SERPL-SCNC: 25 MMOL/L (ref 21–32)
CREAT SERPL-MCNC: 0.6 MG/DL (ref 0.6–1)
DIFFERENTIAL METHOD BLD: ABNORMAL
EOSINOPHIL # BLD: 0.2 K/UL (ref 0–0.8)
EOSINOPHIL NFR BLD: 4 % (ref 0.5–7.8)
ERYTHROCYTE [DISTWIDTH] IN BLOOD BY AUTOMATED COUNT: 14.7 % (ref 11.9–14.6)
GLOBULIN SER CALC-MCNC: 4.2 G/DL (ref 2.3–3.5)
GLUCOSE BLD STRIP.AUTO-MCNC: 172 MG/DL (ref 65–100)
GLUCOSE SERPL-MCNC: 170 MG/DL (ref 65–100)
HCT VFR BLD AUTO: 34.9 % (ref 35.8–46.3)
HGB BLD-MCNC: 11.8 G/DL (ref 11.7–15.4)
IMM GRANULOCYTES # BLD AUTO: 0.2 K/UL (ref 0–0.5)
IMM GRANULOCYTES NFR BLD AUTO: 3 % (ref 0–5)
LYMPHOCYTES # BLD: 0.8 K/UL (ref 0.5–4.6)
LYMPHOCYTES NFR BLD: 16 % (ref 13–44)
MCH RBC QN AUTO: 29 PG (ref 26.1–32.9)
MCHC RBC AUTO-ENTMCNC: 33.8 G/DL (ref 31.4–35)
MCV RBC AUTO: 85.7 FL (ref 79.6–97.8)
MONOCYTES # BLD: 0.7 K/UL (ref 0.1–1.3)
MONOCYTES NFR BLD: 14 % (ref 4–12)
NEUTS SEG # BLD: 3.1 K/UL (ref 1.7–8.2)
NEUTS SEG NFR BLD: 61 % (ref 43–78)
NRBC # BLD: 0 K/UL (ref 0–0.2)
PLATELET # BLD AUTO: 175 K/UL (ref 150–450)
PMV BLD AUTO: 10 FL (ref 9.4–12.3)
POTASSIUM SERPL-SCNC: 3.5 MMOL/L (ref 3.5–5.1)
PROT SERPL-MCNC: 6.2 G/DL (ref 6.3–8.2)
RBC # BLD AUTO: 4.07 M/UL (ref 4.05–5.2)
SERVICE CMNT-IMP: ABNORMAL
SODIUM SERPL-SCNC: 135 MMOL/L (ref 136–145)
WBC # BLD AUTO: 5.1 K/UL (ref 4.3–11.1)

## 2022-04-11 PROCEDURE — 82962 GLUCOSE BLOOD TEST: CPT

## 2022-04-11 PROCEDURE — 74011000250 HC RX REV CODE- 250: Performed by: FAMILY MEDICINE

## 2022-04-11 PROCEDURE — 74011636637 HC RX REV CODE- 636/637: Performed by: INTERNAL MEDICINE

## 2022-04-11 PROCEDURE — 74011250637 HC RX REV CODE- 250/637: Performed by: INTERNAL MEDICINE

## 2022-04-11 PROCEDURE — 3331090002 HH PPS REVENUE DEBIT

## 2022-04-11 PROCEDURE — 3331090001 HH PPS REVENUE CREDIT

## 2022-04-11 PROCEDURE — 85025 COMPLETE CBC W/AUTO DIFF WBC: CPT

## 2022-04-11 PROCEDURE — 36415 COLL VENOUS BLD VENIPUNCTURE: CPT

## 2022-04-11 PROCEDURE — 74011250637 HC RX REV CODE- 250/637: Performed by: SURGERY

## 2022-04-11 PROCEDURE — 80053 COMPREHEN METABOLIC PANEL: CPT

## 2022-04-11 RX ORDER — MIDODRINE HYDROCHLORIDE 5 MG/1
5 TABLET ORAL
Qty: 1 TABLET | Refills: 0 | Status: SHIPPED
Start: 2022-04-11

## 2022-04-11 RX ORDER — BUMETANIDE 1 MG/1
1 TABLET ORAL 3 TIMES DAILY
Qty: 1 TABLET | Refills: 0 | Status: SHIPPED
Start: 2022-04-11

## 2022-04-11 RX ORDER — OXYCODONE HYDROCHLORIDE 5 MG/1
5 TABLET ORAL
Qty: 20 TABLET | Refills: 0 | Status: SHIPPED | OUTPATIENT
Start: 2022-04-11 | End: 2022-04-14

## 2022-04-11 RX ORDER — LACTULOSE 10 G/15ML
20 SOLUTION ORAL; RECTAL 3 TIMES DAILY
Qty: 480 ML | Refills: 0 | Status: SHIPPED
Start: 2022-04-11

## 2022-04-11 RX ORDER — SPIRONOLACTONE 100 MG/1
100 TABLET, FILM COATED ORAL DAILY
Qty: 1 TABLET | Refills: 0 | Status: SHIPPED
Start: 2022-04-11

## 2022-04-11 RX ORDER — SPIRONOLACTONE 25 MG/1
100 TABLET ORAL
Status: DISCONTINUED | OUTPATIENT
Start: 2022-04-11 | End: 2022-04-11 | Stop reason: HOSPADM

## 2022-04-11 RX ADMIN — BUMETANIDE 1 MG: 1 TABLET ORAL at 09:50

## 2022-04-11 RX ADMIN — Medication 2 UNITS: at 09:51

## 2022-04-11 RX ADMIN — PANTOPRAZOLE SODIUM 40 MG: 40 TABLET, DELAYED RELEASE ORAL at 06:33

## 2022-04-11 RX ADMIN — OXYCODONE HYDROCHLORIDE 15 MG: 5 TABLET ORAL at 09:45

## 2022-04-11 RX ADMIN — FLUOXETINE 40 MG: 20 CAPSULE ORAL at 09:45

## 2022-04-11 RX ADMIN — RIFAXIMIN 550 MG: 550 TABLET ORAL at 09:45

## 2022-04-11 RX ADMIN — SODIUM CHLORIDE, PRESERVATIVE FREE 10 ML: 5 INJECTION INTRAVENOUS at 06:33

## 2022-04-11 NOTE — CONSULTS
Comprehensive Nutrition Assessment    Type and Reason for Visit: Initial,Consult  Poor intake/appetite 5 or more days (Hospitalist)  General nutrition management/ other reason Recent abdominal surgery, cirrhosis, albumin 1.8 (Hospitalist)    Nutrition Recommendations/Plan:   Meals and Snacks:  Continue current diet. Nutrition Supplement Therapy:   Medical food supplement therapy:  Initiate Glucerna Shake three times per day (this provides 220 kcal and 10 grams protein per bottle). Specify vanilla or strawberry. Malnutrition Assessment:  Malnutrition Status: Moderate malnutrition  Context: Chronic illness  Findings of clinical characteristics of malnutrition:   Energy Intake:  7 - 75% or less est energy requirements for 1 month or longer (<50% of estimated needs for 2-3 months)  Weight Loss:  Unable to assess (no current wt and ascites masks weight loss)     Body Fat Loss:  1 - Mild body fat loss, Triceps   Muscle Mass Loss:  1 - Mild muscle mass loss, Temples (temporalis)  Fluid Accumulation:   , Ascites   Strength:  Not performed       Nutrition Assessment:   Nutrition History: 4/11: Appetite and intake hs been up and down for the past 2-3 months d/t increasing recurrent ascites and need for paracentesis every ~9-10 days. Pt was able to eat better when she was requiring paracentesis about every other month when she was taking spirolactone. She was living with another sister up until Sept 2021 and she saw a specialist in [facility] that recommended a low sodium diet last Spring. Pt says that she had a hard time eating the low sodium diet. She tried mom's meals as recommended by MD, but found they taste like cardboard. She does not limit sodium intake and frequently eats hotdogs and chicken pot pie. She somtimes drinks coffe with whey protein powder, but does not like it very much because it does not mix well.  Her sister also boughr her some Premier protein drinks, but she does not consume those consistently either. Estimated UBW without ascites is ~150#. Weight with ascites has been as high as 180's. Nutrition Background: H/O: DM, cirrhosis d/t CARABALLO, HH, esophageal varices, CAD/HLD,S/P 8.65 L paracentesis 4/6. P/W  N/V. Findings of UTI and SBO d/t incarcerated hernia. S/P hernia repair 4/7  Nutrition Interval:  Pt seen in company of sister. Pt reports she does not know how her appetite is because all she has had is broth. She is looking forward to solid food. However sister notes increased ascites with plan for paracentesis tomorrow. Pt receptive to try Glucerna shake. Albumin and Prealbumin levels are indicators of inflammatory metabolism and severity of illness and are not directly linked to inadequate nutritional intake. Therefore these should not be used as indicators of nutritional status or recovery. Nutrition Related Findings: 4/7: NPO, then CLD, 4/10:FLD, 4/11: CCHO, 2 GM NA    Wound Type: Surgical incision    Current Nutrition Therapies:  ADULT DIET Regular; 3 carb choices (45 gm/meal); Low Sodium (2 gm)    Current Intake:   Average Meal Intake:  (NPO  or liquid diet since admission.  Solid food to start at lunch today.)        Anthropometric Measures:  Height:   5' 4\"  Current Body Wt:  (none), Weight source:    BMI:  ,  (Unable to assess without a current dry wt/+ascites)  Admission Body Weight:  (none)   Usual Body Wt:  :   stated estimated #        Edema: No data recorded  Estimated Daily Nutrient Needs:  Energy (kcal/day): 3653-8106 (Kcal/kg (25-30), Weight Used: Ideal (54.5 kg))  Protein (g/day): 55-65 (1-1.2 g/kg) Weight Used: (Ideal)  Fluid (ml/day):   (1 ml/kcal)    Nutrition Diagnosis:   · Inadequate oral intake related to altered GI function,altered GI structure as evidenced by NPO or clear liquid status due to medical condition,GI abnormality (S/P  hernia repair/SBO,ascites)    · Moderate malnutrition,In context of chronic illness related to inadequate protein-energy intake as evidenced by  (as above)    Nutrition Interventions:   Food and/or Nutrient Delivery: Continue current diet,Start oral nutrition supplement  Nutrition Education/Counseling: Education initiated. Discussed rationale for low sodium diet. Pt does not voice readiness to change. Also discussed rationale and use of ONS specifically needs for both kcal and protein, rather than high protein/low kcal.  Coordination of Nutrition Care: Continue to monitor while inpatient,Interdisciplinary rounds      Goals:       Active Goal: Intake >50% of estimated needs by follow up    Nutrition Monitoring and Evaluation:      Food/Nutrient Intake Outcomes: Food and nutrient intake,Supplement intake       Discharge Planning:    Continue oral nutrition supplement    Roselia Alba, [address] [place] [address], [address], [address] [place] [address]

## 2022-04-11 NOTE — DISCHARGE INSTR - DIET
Good nutrition is important when healing from an illness, injury, or surgery. Follow any nutrition recommendations given to you during your hospital stay. If you were given an oral nutrition supplement while in the hospital, continue to take this supplement at home. You can take it with meals, in-between meals, and/or before bedtime. These supplements can be purchased at most local grocery stores, pharmacies, and chain [employer]stores. If you have any questions about your diet or nutrition, call the hospital and ask for the dietitian. Discharge Nutrition Plan: Continue Oral Nutrition Supplement (ONS) at discharge. Recommend Glucerna or a comparable/similar product Three times daily for 30 days unless otherwise directed by your Primary Care Physician.    Gordon Handley, [address], [address] [place] [address], [address] [place] [address]

## 2022-04-11 NOTE — DISCHARGE SUMMARY
Hospitalist Discharge Summary     Patient ID:  Hernandez Quintana  [id number]  71 y.o.  1950  Admit date: 4/6/2022  Discharge date: 4/11/2022  Attending: Shagufta Das DO  PCP:  Ganesh Galvan MD  Treatment Team: Attending Provider: Shagufta Das DO; Hospitalist: Shagufta Das DO; Utilization Review: Diann Kenny; Care Manager: Gabriella Espinoza LMSW; : Javier Smith; Staff Nurse: Nader Fried RN; Physical Therapy Assistant: Иван Escalera PTA; Utilization Review: Stevie Meza RN    Principal Diagnosis Incarcerated umbilical hernia    Hospital Problems as of 4/11/2022 Date Reviewed: 3/21/2022          Codes Class Noted - Resolved POA    * (Principal) Incarcerated umbilical hernia GLI-01-CY: K42.0  ICD-9-CM: 552.1  4/6/2022 - Present Yes        SBO (small bowel obstruction) ([facility] ICD-10-CM: E44.645  ICD-9-CM: 560.9  4/6/2022 - Present Yes        Intractable nausea and vomiting ICD-10-CM: R11.2  ICD-9-CM: 536.2  4/7/2022 - Present Yes        UTI (urinary tract infection) ICD-10-CM: N39.0  ICD-9-CM: 599.0  9/29/2020 - Present Yes        Liver 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 (nonalcoholic steatohepatitis) ([facility] ICD-10-CM: K75.81, K74.60  ICD-9-CM: 571.8, 571.5  4/26/2021 - Present Unknown        Type II diabetes mellitus ([facility] ICD-10-CM: E11.9  ICD-9-CM: 250.00  11/19/2013 - Present Yes        CAD (coronary artery disease) ICD-10-CM: I25.10  ICD-9-CM: 414.00  4/7/2022 - Present Yes        Esophageal varices determined by endoscopy ([facility] ICD-10-CM: I85.00  ICD-9-CM: 456.1  12/8/2021 - Present Yes        Hiatal hernia ICD-10-CM: K44.9  ICD-9-CM: 553.3  12/8/2021 - Present Yes        Dyslipidemia ICD-10-CM: E78.5  ICD-9-CM: 272.4  6/30/2020 - Present Yes        RIA (obstructive sleep apnea) ICD-10-CM: G47.33  ICD-9-CM: 327.23  8/9/2012 - Present Yes                  Hospital Course:  Please refer to the admission H&P for details of presentation.  In summary, the patient is a 66 y. o. female with a PMH of CARABALLO cirrhosis who presented to ED with abdominal pain after undergoing paracentesis. On 4/6, she got the paracentesis, had dinner and then she became ill with nausea and vomiting on the drive home. Upon ER evaluation, patient found to have an incarcerated umbilical hernia.  Unsuccessful bedside reduction x3. CT A/P was obtained which shows SBO at level of umbilical hernia. Lisa Southward is also indicative of infection.  On-call General Surgery was consulted by ER due to incarcerated hernia. She was admitted and NGT was placed. She went to the OR on 4/7 for surgical hernia reduction and mesh placement. Post-op she had some mild confusion, which resolved. She developed ascites after being on IVFs post-op. She tolerated a regular diet. She remained stable and was discharged home. She will follow up with IR on 4/12 for paracentesis. Did Patient Have Sepsis: NO    Procedures done this admission:  Procedure(s):  INCARCERATED INCISIONAL HERNIA    Consults this admission:  IP CONSULT TO GENERAL SURGERY  IP CONSULT TO INTERVENTIONAL RADIOLOGY    Significant Diagnostic Studies:    Labs: Results:       Chemistry Recent Labs     04/11/22  0531 04/10/22  0511 04/09/22 0448   * 206* 188*   * 137 135*   K 3.5 3.7 4.2    107 104   CO2 25 24 25   BUN 5* 6* 8   CREA 0.60 0.61 0.73   CA 8.1* 7.8* 8.0*   AGAP 6* 6* 6*   * 160* 148*   TP 6.2* 5.7* 5.9*   ALB 2.0* 1.8* 2.0*   GLOB 4.2* 3.9* 3.9*   AGRAT 0.5* 0.5* 0.5*      CBC w/Diff Recent Labs     04/11/22 0531 04/10/22  0511 04/09/22  0448   WBC 5.1 4.5 5.2   RBC 4.07 3.75* 3.80*   HGB 11.8 10.9* 10.9*   HCT 34.9* 32.4* 32.9*    151 145*   GRANS 61 67 63   LYMPH 16 14 16   EOS 4 4 4      Cardiac Enzymes No results for input(s): CPK, CKND1, SUNNY in the last 72 hours. No lab exists for component: CKRMB, TROIP   Coagulation No results for input(s): PTP, INR, APTT, INREXT in the last 72 hours.     Lipid Panel Lab Results   Component Value Date/Time    Cholesterol, total 172 12/08/2021 03:03 PM    HDL Cholesterol 48 12/08/2021 03:03 PM    LDL, calculated 106 (H) 12/08/2021 03:03 PM    VLDL, calculated 18 12/08/2021 03:03 PM    Triglyceride 96 12/08/2021 03:03 PM      BNP No results for input(s): BNPP in the last 72 hours. Liver Enzymes Recent Labs     04/11/22  0531   TP 6.2*   ALB 2.0*   *      Thyroid Studies No results found for: T4, T3U, TSH, TSHEXT         Imaging:  XR ABD (KUB)    Result Date: 4/7/2022  NG tube in satisfactory position. XR ABD (KUB)    Result Date: 4/7/2022  Mildly improved small bowel obstruction. XR ABD (KUB)    Result Date: 4/7/2022  As above. XR ABD (KUB)    Result Date: 4/7/2022  As above. CT ABD PELV W CONT    Result Date: 4/6/2022  1. Small bowel obstruction, at the level of an umbilical hernia containing a short loop of small bowel. 2. Cirrhotic liver, with upper abdominal/paraesophageal varicosities, splenomegaly and mild ascites suggesting portal hypertension. The portal vein appears patent. 3. Dilated 1.4 cm common bile duct, in this postcholecystectomy patient, of unclear etiology. IR PARACENTESIS ABD W IMAGE    Result Date: 1/7/8089  Uncomplicated ultrasound guided paracentesis. Microbiology/Cultures: All Micro Results     Procedure Component Value Units Date/Time    CULTURE, URINE [[id number]]  (Abnormal) Collected: 04/06/22 2146    Order Status: Completed Specimen: Urine from Clean catch Updated: 04/11/22 0740     Special Requests: NO SPECIAL REQUESTS        Culture result:       >100,000 COLONIES/mL GRAM NEGATIVE RODS THIS ORGANISM IS TOO FASTIDIOUS FOR ROUTINE CULTURE WORKUP, WILL REFER TO REFERENCE LABORATORY RESULTING IN A DELAYED FINAL REPORT. WILL SEND 4/12/22, Suzzanne Alpers                  10,000 to 50,000 COLONIES/mL MIXED SKIN ALEKSANDR ISOLATED                  Susceptibility delayed due to mixed aleksandr.                 Discharge Exam:  Visit Vitals  BP (!) 167/76 (BP 1 Location: Right upper arm, BP Patient Position: Supine)   Pulse 95   Temp 97.9 °F (36.6 °C)   Resp 16   SpO2 98%     General appearance: alert, cooperative, no distress, appears stated age  Lungs: clear to auscultation bilaterally  Heart: regular rate and rhythm, S1, S2 normal, no murmur, click, rub or gallop  Abdomen: soft, non-tender. Bowel sounds normal. Distended with fluid wave. Extremities: no cyanosis or edema  Neurologic: Grossly normal    Disposition: [place] Health Care [facility] [place]  Discharge Condition: stable  Patient Instructions:   Current Discharge Medication List      START taking these medications    Details   lactulose (CHRONULAC) 10 gram/15 mL solution Take 30 mL by mouth three (3) times daily. Titrate to 3-4 loose stools daily  Qty: 480 mL, Refills: 0  Start date: 4/11/2022         CONTINUE these medications which have CHANGED    Details   bumetanide (BUMEX) 1 mg tablet Take 1 Tablet by mouth three (3) times daily. Qty: 1 Tablet, Refills: 0  Start date: 4/11/2022      spironolactone (ALDACTONE) 100 mg tablet Take 1 Tablet by mouth daily. Qty: 1 Tablet, Refills: 0  Start date: 4/11/2022      midodrine (PROAMATINE) 5 mg tablet Take 1 Tablet by mouth three (3) times daily (with meals). Qty: 1 Tablet, Refills: 0  Start date: 4/11/2022         CONTINUE these medications which have NOT CHANGED    Details   FLUoxetine (PROzac) 40 mg capsule Take 40 mg by mouth daily. Takes in AM, pcp is trying to wean this Medication down   Indications: anxiousness associated with depression      insulin glargine (LANTUS,BASAGLAR) 100 unit/mL (3 mL) inpn 42 Units by SubCUTAneous route two (2) times a day for 180 days. Qty: 27 mL, Refills: 5      insulin lispro (HUMALOG) 100 unit/mL kwikpen 20 Units by SubCUTAneous route Before breakfast, lunch, and dinner. Qty: 6 Pen, Refills: 5      mirabegron ER (Myrbetriq) 25 mg ER tablet Take 1 Tablet by mouth daily.   Qty: 30 Tablet, Refills: 2      acetaminophen (TYLENOL) 500 mg tablet Take 1,000 mg by mouth every six (6) hours as needed for Pain. ondansetron (ZOFRAN ODT) 4 mg disintegrating tablet Take 4 mg by mouth every twelve (12) hours as needed for Nausea or Vomiting. pravastatin (PRAVACHOL) 40 mg tablet Take 1 Tablet by mouth nightly. Qty: 90 Tablet, Refills: 1      magnesium oxide (MAG-OX) 400 mg tablet Take 400 mg by mouth two (2) times a day. zinc sulfate 50 mg zinc (220 mg) tablet Take 1 Tablet by mouth daily. ferrous sulfate 325 mg (65 mg iron) tablet Take 325 mg by mouth Daily (before breakfast). cholecalciferol (VITAMIN D3) (5000 Units/125 mcg) tab tablet Take 5,000 Units by mouth daily. propranoloL (INDERAL) 20 mg tablet Take 20 mg by mouth two (2) times a day. omega 3-dha-epa-fish oil 100-160-1,000 mg cap Take 1,000 mg by mouth.      pantoprazole (PROTONIX) 40 mg tablet Take 40 mg by mouth two (2) times a day. rifAXIMin (XIFAXAN) 550 mg tablet Take 550 mg by mouth two (2) times a day. VITAMIN A PO Take 2,400 mcg by mouth daily. aspirin 81 mg chewable tablet Take 81 mg by mouth daily. Insulin Needles, Disposable, (Comfort EZ Pen Needles) 32 gauge x 5/16\" ndle Use 4 times daily  Qty: 120 Each, Refills: 5         STOP taking these medications       lidocaine (LIDODERM) 5 % Comments:   Reason for Stopping:               Activity: Activity as tolerated  Diet: Regular Diet, 2 gram sodium  Wound Care: As directed    Follow-up:  Follow-up Appointments   Procedures    FOLLOW UP VISIT Appointment in: One Week PCP within one week     PCP within one week     Standing Status:   Standing     Number of Occurrences:   1     Order Specific Question:   Appointment in     Answer:    One Week    FOLLOW UP VISIT Appointment in: One Week Dr. Samantha Butcher in 7-10 days     Dr. Samantha Butcher in 7-10 days     Standing Status:   Standing     Number of Occurrences:   1     Standing Expiration Date:   4/12/2022     Order Specific Question: Appointment in     Answer: One Week    FOLLOW UP VISIT Appointment in: Other (Specify) [facility] in 2-4 [facility]     [facility] in 2-4 weeks     Standing Status:   Standing     Number of Occurrences:   1     Standing Expiration Date:   4/12/2022     Order Specific Question:   Appointment in     Answer: Other (Specify)       Follow-up Information     Follow up With Specialties Details Why Contact Info    Marina Carlos MD General Surgery  10-14 days post op incarcerated umbilical hernia 4/7 [address]  [phone]      Brayan Hutton MD Family Medicine   [address]  [facility] [address] [phone]            Follow up labs/diagnostics (ultimately defer to outpatient provider):  4/12 IR for paracentesis    Plan was discussed with nursing, case management, patient, and sister. All questions answered. Patient was stable at time of discharge. Instructions given to call a physician or return if any concerns. Time spent in patient discharge and coordination 34 minutes.   Signed:  Shani Ornelas DO  4/11/2022  12:09 PM

## 2022-04-11 NOTE — PROGRESS NOTES
Patient is anticipated to discharge today. IM letter delivered and signed. Patient in agreement with discharge. SW called [facility] [place] and notified of discharge. Resumption orders have already been submitted. Plan for discharge is as follows:    Care Management Interventions  PCP Verified by CM: Yes  Mode of Transport at Discharge: Self  Transition of Care Consult (CM Consult): Discharge Planning,Home Health  Physical Therapy Consult: Yes  Occupational Therapy Consult: Yes  Support Systems: Child(chad)  Confirm Follow Up Transport: Family  The Plan for Transition of Care is Related to the Following Treatment Goals : HH  The Patient and/or Patient Representative was Provided with a Choice of Provider and Agrees with the Discharge Plan?: Yes  Name of the Patient Representative Who was Provided with a Choice of Provider and Agrees with the Discharge Plan: Jazmin Fried  Freedom of Choice List was Provided with Basic Dialogue that Supports the Patient's Individualized Plan of Care/Goals, Treatment Preferences and Shares the Quality Data Associated with the Providers?: Yes  Discharge Location  Patient Expects to be Discharged to[de-identified] Home with home health    Milestones and interventions have been entered.      Lázaro Ugalde LMSW    [facility]    * [email or web address]

## 2022-04-11 NOTE — PROGRESS NOTES
Problem: Diabetes Self-Management  Goal: *Disease process and treatment process  Description: Define diabetes and identify own type of diabetes; list 3 options for treating diabetes. Outcome: Resolved/Met  Goal: *Incorporating nutritional management into lifestyle  Description: Describe effect of type, amount and timing of food on blood glucose; list 3 methods for planning meals. Outcome: Resolved/Met  Goal: *Incorporating physical activity into lifestyle  Description: State effect of exercise on blood glucose levels. Outcome: Resolved/Met  Goal: *Developing strategies to promote health/change behavior  Description: Define the ABC's of diabetes; identify appropriate screenings, schedule and personal plan for screenings. Outcome: Resolved/Met  Goal: *Using medications safely  Description: State effect of diabetes medications on diabetes; name diabetes medication taking, action and side effects. Outcome: Resolved/Met  Goal: *Monitoring blood glucose, interpreting and using results  Description: Identify recommended blood glucose targets  and personal targets. Outcome: Resolved/Met  Goal: *Prevention, detection, treatment of acute complications  Description: List symptoms of hyper- and hypoglycemia; describe how to treat low blood sugar and actions for lowering  high blood glucose level. Outcome: Resolved/Met  Goal: *Prevention, detection and treatment of chronic complications  Description: Define the natural course of diabetes and describe the relationship of blood glucose levels to long term complications of diabetes.   Outcome: Resolved/Met  Goal: *Developing strategies to address psychosocial issues  Description: Describe feelings about living with diabetes; identify support needed and support network  Outcome: Resolved/Met  Goal: *Insulin pump training  Outcome: Resolved/Met  Goal: *Sick day guidelines  Outcome: Resolved/Met  Goal: *Patient Specific Goal (EDIT GOAL, INSERT TEXT)  Outcome: Resolved/Met Problem: Patient Education: Go to Patient Education Activity  Goal: Patient/Family Education  Outcome: Resolved/Met     Problem: Falls - Risk of  Goal: *Absence of Falls  Description: Document Katie Ferguson Fall Risk and appropriate interventions in the flowsheet. Outcome: Resolved/Met     Problem: Patient Education: Go to Patient Education Activity  Goal: Patient/Family Education  Outcome: Resolved/Met     Problem: Surgical Pathway Post-Op Day 1  Goal: Off Pathway (Use only if patient is Off Pathway)  Outcome: Resolved/Met  Goal: Activity/Safety  Outcome: Resolved/Met  Goal: Diagnostic Test/Procedures  Outcome: Resolved/Met  Goal: Nutrition/Diet  Outcome: Resolved/Met  Goal: Discharge Planning  Outcome: Resolved/Met  Goal: Medications  Outcome: Resolved/Met  Goal: Respiratory  Outcome: Resolved/Met     Problem: Patient Education: Go to Patient Education Activity  Goal: Patient/Family Education  Outcome: Resolved/Met     Problem: Pressure Injury - Risk of  Goal: *Prevention of pressure injury  Description: Document Jamil Scale and appropriate interventions in the flowsheet. Outcome: Resolved/Met     Problem: Patient Education: Go to Patient Education Activity  Goal: Patient/Family Education  Outcome: Resolved/Met     Problem: Pressure Injury - Risk of  Goal: *Prevention of pressure injury  Description: Document Jamil Scale and appropriate interventions in the flowsheet.   Outcome: Resolved/Met     Problem: Patient Education: Go to Patient Education Activity  Goal: Patient/Family Education  Outcome: Resolved/Met     Problem: Patient Education: Go to Patient Education Activity  Goal: Patient/Family Education  Outcome: Resolved/Met     Problem: Patient Education: Go to Patient Education Activity  Goal: Patient/Family Education  Outcome: Resolved/Met

## 2022-04-12 ENCOUNTER — HOME CARE VISIT (OUTPATIENT)
Dept: SCHEDULING | Facility: HOME HEALTH | Age: 72
End: 2022-04-12
Payer: MEDICARE

## 2022-04-12 ENCOUNTER — HOSPITAL ENCOUNTER (OUTPATIENT)
Dept: INTERVENTIONAL RADIOLOGY/VASCULAR | Age: 72
Discharge: HOME | End: 2022-04-12
Attending: INTERNAL MEDICINE
Payer: MEDICARE

## 2022-04-12 VITALS
HEART RATE: 75 BPM | SYSTOLIC BLOOD PRESSURE: 139 MMHG | DIASTOLIC BLOOD PRESSURE: 65 MMHG | RESPIRATION RATE: 20 BRPM | TEMPERATURE: 97.1 F | OXYGEN SATURATION: 97 %

## 2022-04-12 LAB
GLUCOSE BLD STRIP.AUTO-MCNC: 58 MG/DL (ref 65–100)
SERVICE CMNT-IMP: ABNORMAL

## 2022-04-12 PROCEDURE — 49083 ABD PARACENTESIS W/IMAGING: CPT

## 2022-04-12 PROCEDURE — 3331090002 HH PPS REVENUE DEBIT

## 2022-04-12 PROCEDURE — 3331090001 HH PPS REVENUE CREDIT

## 2022-04-12 PROCEDURE — 82962 GLUCOSE BLOOD TEST: CPT

## 2022-04-12 PROCEDURE — P9047 ALBUMIN (HUMAN), 25%, 50ML: HCPCS | Performed by: FAMILY MEDICINE

## 2022-04-12 PROCEDURE — 74011250636 HC RX REV CODE- 250/636: Performed by: FAMILY MEDICINE

## 2022-04-12 PROCEDURE — 77030010507 HC ADH SKN DERMBND J&J -B

## 2022-04-12 PROCEDURE — 77030014146 HC TY THORCENT/PARACENT BD -B

## 2022-04-12 PROCEDURE — G0299 HHS/HOSPICE OF RN EA 15 MIN: HCPCS

## 2022-04-12 PROCEDURE — 74011000250 HC RX REV CODE- 250: Performed by: FAMILY MEDICINE

## 2022-04-12 RX ORDER — ALBUMIN HUMAN 250 G/1000ML
12.5-5 SOLUTION INTRAVENOUS
Status: DISCONTINUED | OUTPATIENT
Start: 2022-04-12 | End: 2022-04-16 | Stop reason: HOSPADM

## 2022-04-12 RX ORDER — LIDOCAINE HYDROCHLORIDE 20 MG/ML
20-200 INJECTION, SOLUTION INFILTRATION; PERINEURAL
Status: DISCONTINUED | OUTPATIENT
Start: 2022-04-12 | End: 2022-04-16 | Stop reason: HOSPADM

## 2022-04-12 RX ADMIN — ALBUMIN (HUMAN) 12.5 G: 0.25 INJECTION, SOLUTION INTRAVENOUS at 14:10

## 2022-04-12 RX ADMIN — LIDOCAINE HYDROCHLORIDE 200 MG: 20 INJECTION, SOLUTION INFILTRATION; PERINEURAL at 13:34

## 2022-04-12 RX ADMIN — ALBUMIN (HUMAN) 12.5 G: 0.25 INJECTION, SOLUTION INTRAVENOUS at 13:56

## 2022-04-12 NOTE — PROGRESS NOTES
Interventional Radiology Post Paracentesis/Thoracentesis Note    4/12/2022    Procedure(s): Ultrasound guided Therapeutic Paracentesis Performed with 8 [place] Island[place] catheter total volume 8800 ml. Preliminary Findings: medium clear and yellow. Complications: None    Plan:  Observation, check labs if drawn.           Chest X-Ray:  no    Full dictated report to follow

## 2022-04-12 NOTE — PROGRESS NOTES
Return to IR room 3 for pt to finish receiving IV Albumin. Sister at bedside.  Report given to Camryn Judge

## 2022-04-12 NOTE — DISCHARGE INSTRUCTIONS
[address]  Department of Interventional Radiology  [facility]  [phone] Office  [phone] Fax    PARACENTESIS DISCHARGE INSTRUCTIONS    General Information:  During this procedure, the doctor will insert a needle into the abdomen to drain fluid. After the procedure, you will be able to take a deep breath much easier. The site of the puncture may ooze the first day. This will decrease and eventually stop. Paracentesis (draining fluid from the abdomen) sometimes makes patients hypotensive (low blood pressure). Your doctor may order for you to receive fluids or albumin (a volume booster) during the procedure through an IV site. Home Care Instructions:  Keep the puncture site clean and dry. No tub baths or swimming until puncture site heals. Showering is acceptable. Resume your normal diet, and resume your normal activity slowly and as you tolerate. If you are short of breath, rest. If shortness of breath does not ease, please call your ordering doctor. Fluid can re-accumulate in the chest and/or in the abdomen. If this should occur, your doctor needs to know as you may need to have the procedure done again. Call If:     You should call your Physician and/or the Radiology Nurse if you notice any signs of infection, like pus draining, or if it is swollen or reddened. Also call if you have a fever, or if you are bleeding from the puncture site more than a small amount on the dressing. Call if the puncture site keeps draining fluid. Some oozing is to be expected, but should slow and then stop. Call if you feel like you have pressure in your abdomen. SEEK IMMEDIATE CARE OR CALL 911 IF YOU SUDDENLY HAVE TROUBLE BREATHING, OR IF YOUR LIPS TURN BLUE, OR IF YOU NOTICE BLOOD IN YOUR SPUTUM. Follow-Up Instructions: Please see your ordering doctor as he/she has requested. To Reach Us:   If you have any questions about your procedure, please call the Interventional Radiology department at [phone]. After business hours (5pm) and weekends, call the answering service at [phone] and ask for the Radiologist on call to be paged. Si tiene Preguntas acerca del procedimiento, por favor llame al departamento de Radiología Intervencional al [phone]. Después de horas de oficina (5 pm) y los fines de [place], llamar al Shivani Bravo al [phone] y pregunte por el Radiologo de Abhi Méndez. Interventional Radiology General Nurse Discharge    After general anesthesia or intravenous sedation, for 24 hours or while taking prescription Narcotics:  · Limit your activities  · Do not drive and operate hazardous machinery  · Do not make important personal or business decisions  · Do  not drink alcoholic beverages  · If you have not urinated within 8 hours after discharge, please contact your surgeon on call. * Please give a list of your current medications to your Primary Care Provider. * Please update this list whenever your medications are discontinued, doses are     changed, or new medications (including over-the-counter products) are added. * Please carry medication information at all times in case of emergency situations. These are general instructions for a healthy lifestyle:    No smoking/ No tobacco products/ Avoid exposure to second hand smoke  Surgeon General's Warning:  Quitting smoking now greatly reduces serious risk to your health. Obesity, smoking, and sedentary lifestyle greatly increases your risk for illness  A healthy diet, regular physical exercise & weight monitoring are important for maintaining a healthy lifestyle    You may be retaining fluid if you have a history of heart failure or if you experience any of the following symptoms:  Weight gain of 3 pounds or more overnight or 5 pounds in a week, increased swelling in our hands or feet or shortness of breath while lying flat in bed.   Please call your doctor as soon as you notice any of these symptoms; do not wait until your next office visit. Recognize signs and symptoms of STROKE:  F-face looks uneven    A-arms unable to move or move unevenly    S-speech slurred or non-existent    T-time-call 911 as soon as signs and symptoms begin-DO NOT go       Back to bed or wait to see if you get better-TIME IS BRAIN.

## 2022-04-13 ENCOUNTER — HOME CARE VISIT (OUTPATIENT)
Dept: SCHEDULING | Facility: HOME HEALTH | Age: 72
End: 2022-04-13
Payer: MEDICARE

## 2022-04-13 VITALS
TEMPERATURE: 97.9 F | HEART RATE: 71 BPM | OXYGEN SATURATION: 98 % | SYSTOLIC BLOOD PRESSURE: 122 MMHG | RESPIRATION RATE: 17 BRPM | DIASTOLIC BLOOD PRESSURE: 70 MMHG

## 2022-04-13 PROCEDURE — 3331090002 HH PPS REVENUE DEBIT

## 2022-04-13 PROCEDURE — 3331090001 HH PPS REVENUE CREDIT

## 2022-04-14 LAB
ANTIMICROBIAL SUSCEPTIBILITY, 080575: ABNORMAL
BACTERIA ISLT: ABNORMAL
RESULT 1, 080571: ABNORMAL
SPECIMEN SOURCE: ABNORMAL

## 2022-04-14 PROCEDURE — 3331090002 HH PPS REVENUE DEBIT

## 2022-04-14 PROCEDURE — 3331090001 HH PPS REVENUE CREDIT

## 2022-04-15 PROCEDURE — 3331090002 HH PPS REVENUE DEBIT

## 2022-04-15 PROCEDURE — 3331090001 HH PPS REVENUE CREDIT

## 2022-04-16 LAB
BACTERIA SPEC CULT: ABNORMAL
SERVICE CMNT-IMP: ABNORMAL

## 2022-04-16 PROCEDURE — 3331090001 HH PPS REVENUE CREDIT

## 2022-04-16 PROCEDURE — 3331090002 HH PPS REVENUE DEBIT

## 2022-04-17 PROCEDURE — 3331090002 HH PPS REVENUE DEBIT

## 2022-04-17 PROCEDURE — 3331090001 HH PPS REVENUE CREDIT

## 2022-04-18 PROCEDURE — 3331090001 HH PPS REVENUE CREDIT

## 2022-04-18 PROCEDURE — 3331090002 HH PPS REVENUE DEBIT

## 2022-04-19 ENCOUNTER — HOME CARE VISIT (OUTPATIENT)
Dept: SCHEDULING | Facility: HOME HEALTH | Age: 72
End: 2022-04-19
Payer: MEDICARE

## 2022-04-19 PROCEDURE — 3331090002 HH PPS REVENUE DEBIT

## 2022-04-19 PROCEDURE — 3331090001 HH PPS REVENUE CREDIT

## 2022-04-20 ENCOUNTER — HOSPITAL ENCOUNTER (OUTPATIENT)
Dept: INTERVENTIONAL RADIOLOGY/VASCULAR | Age: 72
Discharge: HOME | End: 2022-04-20
Attending: INTERNAL MEDICINE
Payer: MEDICARE

## 2022-04-20 ENCOUNTER — HOME CARE VISIT (OUTPATIENT)
Dept: SCHEDULING | Facility: HOME HEALTH | Age: 72
End: 2022-04-20
Payer: MEDICARE

## 2022-04-20 VITALS
DIASTOLIC BLOOD PRESSURE: 62 MMHG | RESPIRATION RATE: 16 BRPM | TEMPERATURE: 97.2 F | HEART RATE: 69 BPM | SYSTOLIC BLOOD PRESSURE: 106 MMHG | OXYGEN SATURATION: 98 %

## 2022-04-20 VITALS
HEART RATE: 67 BPM | HEIGHT: 64 IN | WEIGHT: 171 LBS | OXYGEN SATURATION: 97 % | DIASTOLIC BLOOD PRESSURE: 59 MMHG | RESPIRATION RATE: 16 BRPM | TEMPERATURE: 97.4 F | BODY MASS INDEX: 29.19 KG/M2 | SYSTOLIC BLOOD PRESSURE: 124 MMHG

## 2022-04-20 DIAGNOSIS — R18.8 OTHER ASCITES: ICD-10-CM

## 2022-04-20 DIAGNOSIS — K75.81 NASH (NONALCOHOLIC STEATOHEPATITIS): ICD-10-CM

## 2022-04-20 PROCEDURE — 49083 ABD PARACENTESIS W/IMAGING: CPT

## 2022-04-20 PROCEDURE — 74011250636 HC RX REV CODE- 250/636: Performed by: PHYSICIAN ASSISTANT

## 2022-04-20 PROCEDURE — 77030010507 HC ADH SKN DERMBND J&J -B

## 2022-04-20 PROCEDURE — 3331090002 HH PPS REVENUE DEBIT

## 2022-04-20 PROCEDURE — 77030014146 HC TY THORCENT/PARACENT BD -B

## 2022-04-20 PROCEDURE — P9047 ALBUMIN (HUMAN), 25%, 50ML: HCPCS | Performed by: PHYSICIAN ASSISTANT

## 2022-04-20 PROCEDURE — 400014 HH F/U

## 2022-04-20 PROCEDURE — G0151 HHCP-SERV OF PT,EA 15 MIN: HCPCS

## 2022-04-20 PROCEDURE — 3331090001 HH PPS REVENUE CREDIT

## 2022-04-20 RX ORDER — ALBUMIN HUMAN 250 G/1000ML
25 SOLUTION INTRAVENOUS ONCE
Status: COMPLETED | OUTPATIENT
Start: 2022-04-20 | End: 2022-04-20

## 2022-04-20 RX ADMIN — ALBUMIN (HUMAN) 12.5 G: 0.25 INJECTION, SOLUTION INTRAVENOUS at 12:18

## 2022-04-20 NOTE — DISCHARGE INSTRUCTIONS
[address]  Department of Interventional Radiology  [facility]  [phone] Office  [phone] Fax    PARACENTESIS DISCHARGE INSTRUCTIONS    General Information:  During this procedure, the doctor will insert a needle into the abdomen to drain fluid. After the procedure, you will be able to take a deep breath much easier. The site of the puncture may ooze the first day. This will decrease and eventually stop. Paracentesis (draining fluid from the abdomen) sometimes makes patients hypotensive (low blood pressure). Your doctor may order for you to receive fluids or albumin (a volume booster) during the procedure through an IV site. Home Care Instructions:  Keep the puncture site clean and dry. No tub baths or swimming until puncture site heals. Showering is acceptable. Resume your normal diet, and resume your normal activity slowly and as you tolerate. If you are short of breath, rest. If shortness of breath does not ease, please call your ordering doctor. Fluid can re-accumulate in the chest and/or in the abdomen. If this should occur, your doctor needs to know as you may need to have the procedure done again. Call If:     You should call your Physician and/or the Radiology Nurse if you notice any signs of infection, like pus draining, or if it is swollen or reddened. Also call if you have a fever, or if you are bleeding from the puncture site more than a small amount on the dressing. Call if the puncture site keeps draining fluid. Some oozing is to be expected, but should slow and then stop. Call if you feel like you have pressure in your abdomen. SEEK IMMEDIATE CARE OR CALL 911 IF YOU SUDDENLY HAVE TROUBLE BREATHING, OR IF YOUR LIPS TURN BLUE, OR IF YOU NOTICE BLOOD IN YOUR SPUTUM. Follow-Up Instructions: Please see your ordering doctor as he/she has requested. To Reach Us:   If you have any questions about your procedure, please call the Interventional Radiology department at [phone]. After business hours (5pm) and weekends, call the answering service at [phone] and ask for the Radiologist on call to be paged. Si tiene Preguntas acerca del procedimiento, por favor llame al departamento de Radiología Intervencional al [phone]. Después de horas de oficina (5 pm) y los fines de [place], llamar al Andrew Bravo al [phone] y pregunte por el Radiologo de Abhi Méndez. Interventional Radiology General Nurse Discharge    After general anesthesia or intravenous sedation, for 24 hours or while taking prescription Narcotics:  · Limit your activities  · Do not drive and operate hazardous machinery  · Do not make important personal or business decisions  · Do  not drink alcoholic beverages  · If you have not urinated within 8 hours after discharge, please contact your surgeon on call. * Please give a list of your current medications to your Primary Care Provider. * Please update this list whenever your medications are discontinued, doses are     changed, or new medications (including over-the-counter products) are added. * Please carry medication information at all times in case of emergency situations. These are general instructions for a healthy lifestyle:    No smoking/ No tobacco products/ Avoid exposure to second hand smoke  Surgeon General's Warning:  Quitting smoking now greatly reduces serious risk to your health. Obesity, smoking, and sedentary lifestyle greatly increases your risk for illness  A healthy diet, regular physical exercise & weight monitoring are important for maintaining a healthy lifestyle    You may be retaining fluid if you have a history of heart failure or if you experience any of the following symptoms:  Weight gain of 3 pounds or more overnight or 5 pounds in a week, increased swelling in our hands or feet or shortness of breath while lying flat in bed.   Please call your doctor as soon as you notice any of these symptoms; do not wait until your next office visit. Recognize signs and symptoms of STROKE:  F-face looks uneven    A-arms unable to move or move unevenly    S-speech slurred or non-existent    T-time-call 911 as soon as signs and symptoms begin-DO NOT go       Back to bed or wait to see if you get better-TIME IS BRAIN.

## 2022-04-20 NOTE — PROGRESS NOTES
Interventional Radiology Post Paracentesis/Thoracentesis Note    4/20/2022    Procedure(s): Ultrasound guided Therapeutic Paracentesis Performed with 8 [place] catheter total volume 6800 ml. Preliminary Findings: large yellow. Complications: None    Plan:  Observation, check labs if drawn.           Chest X-Ray:  no

## 2022-04-21 PROCEDURE — 3331090001 HH PPS REVENUE CREDIT

## 2022-04-21 PROCEDURE — 3331090002 HH PPS REVENUE DEBIT

## 2022-04-22 PROCEDURE — 3331090001 HH PPS REVENUE CREDIT

## 2022-04-22 PROCEDURE — 3331090002 HH PPS REVENUE DEBIT

## 2022-04-23 ENCOUNTER — HOME CARE VISIT (OUTPATIENT)
Dept: SCHEDULING | Facility: HOME HEALTH | Age: 72
End: 2022-04-23
Payer: MEDICARE

## 2022-04-23 VITALS
HEART RATE: 72 BPM | OXYGEN SATURATION: 97 % | SYSTOLIC BLOOD PRESSURE: 106 MMHG | DIASTOLIC BLOOD PRESSURE: 82 MMHG | TEMPERATURE: 97 F | RESPIRATION RATE: 17 BRPM

## 2022-04-23 PROCEDURE — 3331090002 HH PPS REVENUE DEBIT

## 2022-04-23 PROCEDURE — 3331090001 HH PPS REVENUE CREDIT

## 2022-04-23 PROCEDURE — G0299 HHS/HOSPICE OF RN EA 15 MIN: HCPCS

## 2022-04-24 PROCEDURE — 3331090001 HH PPS REVENUE CREDIT

## 2022-04-24 PROCEDURE — 3331090002 HH PPS REVENUE DEBIT

## 2022-04-25 PROCEDURE — 3331090002 HH PPS REVENUE DEBIT

## 2022-04-25 PROCEDURE — 3331090001 HH PPS REVENUE CREDIT

## 2022-04-26 PROCEDURE — 3331090002 HH PPS REVENUE DEBIT

## 2022-04-26 PROCEDURE — 3331090001 HH PPS REVENUE CREDIT

## 2022-04-27 ENCOUNTER — HOME CARE VISIT (OUTPATIENT)
Dept: SCHEDULING | Facility: HOME HEALTH | Age: 72
End: 2022-04-27
Payer: MEDICARE

## 2022-04-27 VITALS
DIASTOLIC BLOOD PRESSURE: 68 MMHG | OXYGEN SATURATION: 96 % | TEMPERATURE: 98.9 F | SYSTOLIC BLOOD PRESSURE: 124 MMHG | HEART RATE: 73 BPM | RESPIRATION RATE: 16 BRPM

## 2022-04-27 PROCEDURE — 3331090001 HH PPS REVENUE CREDIT

## 2022-04-27 PROCEDURE — G0299 HHS/HOSPICE OF RN EA 15 MIN: HCPCS

## 2022-04-27 PROCEDURE — 3331090002 HH PPS REVENUE DEBIT

## 2022-04-28 ENCOUNTER — HOSPITAL ENCOUNTER (OUTPATIENT)
Dept: INTERVENTIONAL RADIOLOGY/VASCULAR | Age: 72
Discharge: HOME | End: 2022-04-28
Attending: INTERNAL MEDICINE
Payer: MEDICARE

## 2022-04-28 VITALS
WEIGHT: 171 LBS | OXYGEN SATURATION: 94 % | TEMPERATURE: 98 F | BODY MASS INDEX: 29.19 KG/M2 | SYSTOLIC BLOOD PRESSURE: 116 MMHG | DIASTOLIC BLOOD PRESSURE: 57 MMHG | RESPIRATION RATE: 18 BRPM | HEART RATE: 70 BPM | HEIGHT: 64 IN

## 2022-04-28 DIAGNOSIS — R18.8 ASCITES: ICD-10-CM

## 2022-04-28 PROCEDURE — P9047 ALBUMIN (HUMAN), 25%, 50ML: HCPCS | Performed by: PHYSICIAN ASSISTANT

## 2022-04-28 PROCEDURE — 74011000250 HC RX REV CODE- 250: Performed by: PHYSICIAN ASSISTANT

## 2022-04-28 PROCEDURE — 77030010507 HC ADH SKN DERMBND J&J -B

## 2022-04-28 PROCEDURE — 3331090001 HH PPS REVENUE CREDIT

## 2022-04-28 PROCEDURE — 77030014146 HC TY THORCENT/PARACENT BD -B

## 2022-04-28 PROCEDURE — 3331090002 HH PPS REVENUE DEBIT

## 2022-04-28 PROCEDURE — 49083 ABD PARACENTESIS W/IMAGING: CPT

## 2022-04-28 PROCEDURE — 74011250636 HC RX REV CODE- 250/636: Performed by: PHYSICIAN ASSISTANT

## 2022-04-28 RX ORDER — LIDOCAINE HYDROCHLORIDE 20 MG/ML
2-20 INJECTION, SOLUTION INFILTRATION; PERINEURAL ONCE
Status: COMPLETED | OUTPATIENT
Start: 2022-04-28 | End: 2022-04-28

## 2022-04-28 RX ORDER — ALBUMIN HUMAN 250 G/1000ML
12.5 SOLUTION INTRAVENOUS
Status: DISCONTINUED | OUTPATIENT
Start: 2022-04-28 | End: 2022-05-02 | Stop reason: HOSPADM

## 2022-04-28 RX ADMIN — LIDOCAINE HYDROCHLORIDE 200 MG: 20 INJECTION, SOLUTION INFILTRATION; PERINEURAL at 11:06

## 2022-04-28 RX ADMIN — ALBUMIN (HUMAN) 12.5 G: 0.25 INJECTION, SOLUTION INTRAVENOUS at 11:42

## 2022-04-28 NOTE — PROGRESS NOTES
Prep complete. Patient ready for procedure. Patient and family wish to speak with provided about Plurex drain. Charge nurse made aware.

## 2022-04-28 NOTE — PROGRESS NOTES
Patient is Alert and discharge instructions given and received. 6,050 ml of ascitic fluid removed. One bottle of albumin given. No labs sent. Patient being scheduled for outpatient Pleur-X drain placement for next Thursday.

## 2022-04-28 NOTE — DISCHARGE INSTRUCTIONS
Davi [address]  Department of Interventional Radiology  [facility]  [phone] Office  [phone] Fax    PARACENTESIS DISCHARGE INSTRUCTIONS    General Information:  During this procedure, the doctor will insert a needle into the abdomen to drain fluid. After the procedure, you will be able to take a deep breath much easier. The site of the puncture may ooze the first day. This will decrease and eventually stop. Paracentesis (draining fluid from the abdomen) sometimes makes patients hypotensive (low blood pressure). Your doctor may order for you to receive fluids or albumin (a volume booster) during the procedure through an IV site. Home Care Instructions:  Keep the puncture site clean and dry. No tub baths or swimming until puncture site heals. Showering is acceptable. Resume your normal diet, and resume your normal activity slowly and as you tolerate. If you are short of breath, rest. If shortness of breath does not ease, please call your ordering doctor. Fluid can re-accumulate in the chest and/or in the abdomen. If this should occur, your doctor needs to know as you may need to have the procedure done again. Call If:     You should call your Physician and/or the Radiology Nurse if you notice any signs of infection, like pus draining, or if it is swollen or reddened. Also call if you have a fever, or if you are bleeding from the puncture site more than a small amount on the dressing. Call if the puncture site keeps draining fluid. Some oozing is to be expected, but should slow and then stop. Call if you feel like you have pressure in your abdomen. SEEK IMMEDIATE CARE OR CALL 911 IF YOU SUDDENLY HAVE TROUBLE BREATHING, OR IF YOUR LIPS TURN BLUE, OR IF YOU NOTICE BLOOD IN YOUR SPUTUM. Follow-Up Instructions: Please see your ordering doctor as he/she has requested. To Reach Us:   If you have any questions about your procedure, please call the Interventional Radiology department at [phone]. After business hours (5pm) and weekends, call the answering service at [phone] and ask for the Radiologist on call to be paged. Si tiene Preguntas acerca del procedimiento, por favor llame al departamento de Radiología Intervencional al [phone]. Después de horas de oficina (5 pm) y los fines de [place], llamar al Alice Bravo al [phone] y pregunte por el Radiologo de [facility]. Interventional Radiology General Nurse Discharge    After general anesthesia or intravenous sedation, for 24 hours or while taking prescription Narcotics:  · Limit your activities  · Do not drive and operate hazardous machinery  · Do not make important personal or business decisions  · Do  not drink alcoholic beverages  · If you have not urinated within 8 hours after discharge, please contact your surgeon on call. * Please give a list of your current medications to your Primary Care Provider. * Please update this list whenever your medications are discontinued, doses are     changed, or new medications (including over-the-counter products) are added. * Please carry medication information at all times in case of emergency situations. These are general instructions for a healthy lifestyle:    No smoking/ No tobacco products/ Avoid exposure to second hand smoke  Surgeon General's Warning:  Quitting smoking now greatly reduces serious risk to your health. Obesity, smoking, and sedentary lifestyle greatly increases your risk for illness  A healthy diet, regular physical exercise & weight monitoring are important for maintaining a healthy lifestyle    You may be retaining fluid if you have a history of heart failure or if you experience any of the following symptoms:  Weight gain of 3 pounds or more overnight or 5 pounds in a week, increased swelling in our hands or feet or shortness of breath while lying flat in bed.   Please call your doctor as soon as you notice any of these symptoms; do not wait until your next office visit. Recognize signs and symptoms of STROKE:  F-face looks uneven    A-arms unable to move or move unevenly    S-speech slurred or non-existent    T-time-call 911 as soon as signs and symptoms begin-DO NOT go       Back to bed or wait to see if you get better-TIME IS BRAIN.

## 2022-04-29 PROCEDURE — 3331090001 HH PPS REVENUE CREDIT

## 2022-04-29 PROCEDURE — 3331090002 HH PPS REVENUE DEBIT

## 2022-04-30 PROCEDURE — 3331090001 HH PPS REVENUE CREDIT

## 2022-04-30 PROCEDURE — 3331090002 HH PPS REVENUE DEBIT

## 2022-05-01 PROCEDURE — 3331090002 HH PPS REVENUE DEBIT

## 2022-05-01 PROCEDURE — 3331090001 HH PPS REVENUE CREDIT

## 2022-05-02 PROCEDURE — 3331090001 HH PPS REVENUE CREDIT

## 2022-05-02 PROCEDURE — 3331090002 HH PPS REVENUE DEBIT

## 2022-05-03 ENCOUNTER — HOME CARE VISIT (OUTPATIENT)
Dept: SCHEDULING | Facility: HOME HEALTH | Age: 72
End: 2022-05-03
Payer: MEDICARE

## 2022-05-03 VITALS
TEMPERATURE: 98.5 F | HEART RATE: 72 BPM | DIASTOLIC BLOOD PRESSURE: 68 MMHG | OXYGEN SATURATION: 96 % | RESPIRATION RATE: 15 BRPM | SYSTOLIC BLOOD PRESSURE: 102 MMHG

## 2022-05-03 PROCEDURE — G0299 HHS/HOSPICE OF RN EA 15 MIN: HCPCS

## 2022-05-03 PROCEDURE — 3331090001 HH PPS REVENUE CREDIT

## 2022-05-03 PROCEDURE — 3331090002 HH PPS REVENUE DEBIT

## 2022-05-04 PROCEDURE — 3331090002 HH PPS REVENUE DEBIT

## 2022-05-04 PROCEDURE — 3331090001 HH PPS REVENUE CREDIT

## 2022-05-05 ENCOUNTER — HOSPITAL ENCOUNTER (OUTPATIENT)
Dept: INTERVENTIONAL RADIOLOGY/VASCULAR | Age: 72
Discharge: HOME | End: 2022-05-05
Attending: RADIOLOGY | Admitting: RADIOLOGY
Payer: MEDICARE

## 2022-05-05 VITALS
DIASTOLIC BLOOD PRESSURE: 57 MMHG | OXYGEN SATURATION: 94 % | WEIGHT: 163 LBS | HEART RATE: 64 BPM | RESPIRATION RATE: 16 BRPM | SYSTOLIC BLOOD PRESSURE: 109 MMHG | TEMPERATURE: 97.9 F | BODY MASS INDEX: 27.98 KG/M2

## 2022-05-05 DIAGNOSIS — K75.81 NASH (NONALCOHOLIC STEATOHEPATITIS): ICD-10-CM

## 2022-05-05 LAB
GLUCOSE BLD STRIP.AUTO-MCNC: 200 MG/DL (ref 65–100)
SERVICE CMNT-IMP: ABNORMAL

## 2022-05-05 PROCEDURE — 77030002916 HC SUT ETHLN J&J -A

## 2022-05-05 PROCEDURE — 77030010507 HC ADH SKN DERMBND J&J -B

## 2022-05-05 PROCEDURE — 74011000250 HC RX REV CODE- 250: Performed by: PHYSICIAN ASSISTANT

## 2022-05-05 PROCEDURE — 77030031139 HC SUT VCRL2 J&J -A

## 2022-05-05 PROCEDURE — 82962 GLUCOSE BLOOD TEST: CPT

## 2022-05-05 PROCEDURE — 74011250636 HC RX REV CODE- 250/636: Performed by: RADIOLOGY

## 2022-05-05 PROCEDURE — 76942 ECHO GUIDE FOR BIOPSY: CPT

## 2022-05-05 PROCEDURE — 77030031131 HC SUT MXN P COVD -B

## 2022-05-05 PROCEDURE — 3331090002 HH PPS REVENUE DEBIT

## 2022-05-05 PROCEDURE — 77030028114 HC DRN KT PLEURX SYS BD -B

## 2022-05-05 PROCEDURE — 74011000250 HC RX REV CODE- 250: Performed by: RADIOLOGY

## 2022-05-05 PROCEDURE — 3331090001 HH PPS REVENUE CREDIT

## 2022-05-05 PROCEDURE — C1729 CATH, DRAINAGE: HCPCS

## 2022-05-05 RX ORDER — LIDOCAINE HYDROCHLORIDE 20 MG/ML
20-300 INJECTION, SOLUTION INFILTRATION; PERINEURAL
Status: DISCONTINUED | OUTPATIENT
Start: 2022-05-05 | End: 2022-05-09 | Stop reason: HOSPADM

## 2022-05-05 RX ORDER — LIDOCAINE HYDROCHLORIDE AND EPINEPHRINE 20; 10 MG/ML; UG/ML
1.5 INJECTION, SOLUTION INFILTRATION; PERINEURAL ONCE
Status: COMPLETED | OUTPATIENT
Start: 2022-05-05 | End: 2022-05-05

## 2022-05-05 RX ORDER — FENTANYL CITRATE 50 UG/ML
25-50 INJECTION, SOLUTION INTRAMUSCULAR; INTRAVENOUS
Status: DISCONTINUED | OUTPATIENT
Start: 2022-05-05 | End: 2022-05-09 | Stop reason: HOSPADM

## 2022-05-05 RX ORDER — CEFAZOLIN SODIUM/WATER 2 G/20 ML
2 SYRINGE (ML) INTRAVENOUS ONCE
Status: COMPLETED | OUTPATIENT
Start: 2022-05-05 | End: 2022-05-05

## 2022-05-05 RX ORDER — MIDAZOLAM HYDROCHLORIDE 1 MG/ML
.5-2 INJECTION, SOLUTION INTRAMUSCULAR; INTRAVENOUS
Status: DISCONTINUED | OUTPATIENT
Start: 2022-05-05 | End: 2022-05-09 | Stop reason: HOSPADM

## 2022-05-05 RX ORDER — SODIUM CHLORIDE 9 MG/ML
25 INJECTION, SOLUTION INTRAVENOUS ONCE
Status: COMPLETED | OUTPATIENT
Start: 2022-05-05 | End: 2022-05-05

## 2022-05-05 RX ORDER — DIPHENHYDRAMINE HYDROCHLORIDE 50 MG/ML
12.5-5 INJECTION, SOLUTION INTRAMUSCULAR; INTRAVENOUS ONCE
Status: COMPLETED | OUTPATIENT
Start: 2022-05-05 | End: 2022-05-05

## 2022-05-05 RX ADMIN — LIDOCAINE HYDROCHLORIDE 100 MG: 20 INJECTION, SOLUTION INFILTRATION; PERINEURAL at 13:52

## 2022-05-05 RX ADMIN — FENTANYL CITRATE 25 MCG: 50 INJECTION, SOLUTION INTRAMUSCULAR; INTRAVENOUS at 14:01

## 2022-05-05 RX ADMIN — CEFAZOLIN SODIUM 2 G: 100 INJECTION, POWDER, LYOPHILIZED, FOR SOLUTION INTRAVENOUS at 13:38

## 2022-05-05 RX ADMIN — FENTANYL CITRATE 25 MCG: 50 INJECTION, SOLUTION INTRAMUSCULAR; INTRAVENOUS at 13:53

## 2022-05-05 RX ADMIN — MIDAZOLAM 0.5 MG: 1 INJECTION INTRAMUSCULAR; INTRAVENOUS at 14:01

## 2022-05-05 RX ADMIN — SODIUM CHLORIDE 25 ML/HR: 9 INJECTION, SOLUTION INTRAVENOUS at 13:20

## 2022-05-05 RX ADMIN — MIDAZOLAM 0.5 MG: 1 INJECTION INTRAMUSCULAR; INTRAVENOUS at 13:53

## 2022-05-05 RX ADMIN — FENTANYL CITRATE 50 MCG: 50 INJECTION, SOLUTION INTRAMUSCULAR; INTRAVENOUS at 13:38

## 2022-05-05 RX ADMIN — MIDAZOLAM 1 MG: 1 INJECTION INTRAMUSCULAR; INTRAVENOUS at 13:38

## 2022-05-05 RX ADMIN — DIPHENHYDRAMINE HYDROCHLORIDE 50 MG: 50 INJECTION, SOLUTION INTRAMUSCULAR; INTRAVENOUS at 13:37

## 2022-05-05 RX ADMIN — LIDOCAINE HYDROCHLORIDE,EPINEPHRINE BITARTRATE 30 MG: 20; .01 INJECTION, SOLUTION INFILTRATION; PERINEURAL at 13:56

## 2022-05-05 NOTE — PROCEDURES
Department of Interventional Radiology  [phone]        Interventional Radiology Brief Procedure Note    Patient: Itzel Villaseñor MRN: [id number]  SSN: [id number]    YOB: 1950  Age: 66 y.o.   Sex: female      Date of Procedure: 5/5/2022    Pre-Procedure Diagnosis: ascites    Post-Procedure Diagnosis: SAME    Procedure(s): Image Guided Drain Placement    Brief Description of Procedure: abdominal pleurx catheter placed    Performed By: Agus Rouse PA-C     Assistants: None    Anesthesia:Lidocaine    Estimated Blood Loss: Less than 10ml    Specimens:  None    Implants:  Tunnelled Abdominal drain     Findings: large volume ascites    Complications: None    Recommendations: drain abdomen dry q 2-3 days x 2 weeks then prn comfort     Follow Up: prn    Signed By: Agus Rouse PA-C     May 5, 2022

## 2022-05-05 NOTE — H&P
Department of Interventional Radiology  [phone]    History and Physical    Patient:  Bran Kuhn MRN:  [id number]  SSN:  [id number]    YOB: 1950  Age:  66 y.o. Sex:  female      Primary Care Provider:  vAiva Contreras MD  Referring Physician:  Romel Aviles MD    Subjective:     Chief Complaint: ascites    History of the Present Illness: The patient is a 66 y.o. female with CARABALLO, ascites who presents for placement of tunneled abdominal. NPO. Past Medical History:   Diagnosis Date    Cirrhosis ([address]     Diabetes ([facility] [address]      Past Surgical History:   Procedure Laterality Date    HX HERNIA REPAIR  2022    IR PARACENTESIS ABD W IMAGE  10/4/2021    IR PARACENTESIS ABD W IMAGE  10/28/2021    IR PARACENTESIS ABD W IMAGE  11/22/2021    IR PARACENTESIS ABD W IMAGE  1/31/2022    IR PARACENTESIS ABD W IMAGE  3/16/2022    IR PARACENTESIS ABD W IMAGE  3/25/2022    IR PARACENTESIS ABD W IMAGE  4/6/2022    IR PARACENTESIS ABD W IMAGE  4/12/2022    IR PARACENTESIS ABD W IMAGE  4/20/2022    IR PARACENTESIS ABD W IMAGE  4/28/2022    RI ABDOMEN SURGERY PROC UNLISTED          Review of Systems:    Pertinent items are noted in the History of Present Illness. Prior to Admission medications    Medication Sig Start Date End Date Taking? Authorizing Provider   Myrbetriq 25 mg ER tablet TAKE 1 TABLET BY MOUTH EVERY DAY 4/27/22   Aviva Contreras MD   FLUoxetine (PROzac) 20 mg capsule Take 1 Capsule by mouth daily. Takes in AM, pcp is trying to wean this Medication down   Indications: anxiousness associated with depression 4/18/22   Aviva Contreras MD   bumetanide (BUMEX) 1 mg tablet Take 1 Tablet by mouth three (3) times daily. 4/11/22   Chris Cardoso DO   spironolactone (ALDACTONE) 100 mg tablet Take 1 Tablet by mouth daily. 4/11/22   Chris Cardoso DO   midodrine (PROAMATINE) 5 mg tablet Take 1 Tablet by mouth three (3) times daily (with meals).  4/11/22   Janae Chris THOMAS,    lactulose (CHRONULAC) 10 gram/15 mL solution Take 30 mL by mouth three (3) times daily. Titrate to 3-4 loose stools daily  Patient not taking: Reported on 4/18/2022 4/11/22   Beth THOMAS DO   insulin glargine (LANTUS,BASAGLAR) 100 unit/mL (3 mL) inpn 42 Units by SubCUTAneous route two (2) times a day for 180 days. 3/21/22 9/17/22  Kinza Mehta MD   Insulin [place], Disposable, (Comfort EZ Pen Needles) 32 gauge x 5/16\" ndle Use 4 times daily 2/23/22   Kinza Mehta MD   insulin lispro (HUMALOG) 100 unit/mL kwikpen 20 Units by SubCUTAneous route Before breakfast, lunch, and dinner. Patient taking differently: 20 Units by SubCUTAneous route Before breakfast, lunch, and dinner. Humalog dose increased once daily to 30 units either at lunch or dinner, depending on amount of carbs. 2/16/22   Kinza Mehta MD   acetaminophen (TYLENOL) 500 mg tablet Take 1,000 mg by mouth every six (6) hours as needed for Pain. Provider, Historical   ondansetron (ZOFRAN ODT) 4 mg disintegrating tablet Take 4 mg by mouth every twelve (12) hours as needed for Nausea or Vomiting. Provider, Historical   pravastatin (PRAVACHOL) 40 mg tablet Take 1 Tablet by mouth nightly. 12/9/21   Kinza Mehta MD   magnesium oxide (MAG-OX) 400 mg tablet Take 400 mg by mouth two (2) times a day. Provider, Historical   zinc sulfate 50 mg zinc (220 mg) tablet Take 1 Tablet by mouth daily. Provider, Historical   ferrous sulfate 325 mg (65 mg iron) tablet Take 325 mg by mouth Daily (before breakfast). Provider, Historical   cholecalciferol (VITAMIN D3) (5000 Units/125 mcg) tab tablet Take 5,000 Units by mouth daily. Provider, Historical   propranoloL (INDERAL) 20 mg tablet Take 20 mg by mouth two (2) times a day. Provider, Historical   omega 3-dha-epa-fish oil 100-160-1,000 mg cap Take 1,000 mg by mouth. Provider, Historical   pantoprazole (PROTONIX) 40 mg tablet Take 40 mg by mouth two (2) times a day. Provider, Historical   rifAXIMin (XIFAXAN) 550 mg tablet Take 550 mg by mouth two (2) times a day. Provider, Historical   VITAMIN A PO Take 2,400 mcg by mouth daily. Provider, Historical   aspirin 81 mg chewable tablet Take 81 mg by mouth daily. Provider, Historical        No Known Allergies    No family history on file.   Social History     Tobacco Use    Smoking status: Never Smoker    Smokeless tobacco: Never Used   Substance Use Topics    Alcohol use: Not Currently        Objective:       Physical Examination:    Vitals:    05/05/22 1133 05/05/22 1138   BP: (!) 148/65    Pulse: 71    Resp: 16    Temp: 97.9 °F (36.6 °C)    SpO2: 94%    Weight:  73.9 kg (163 lb)       Pain Assessment   0/10               HEART: regular rate and rhythm  LUNG: clear to auscultation bilaterally  ABDOMEN: normal findings: soft, non-tender  EXTREMITIES: warm, no edema    Laboratory:     Lab Results   Component Value Date/Time    Sodium 136 04/18/2022 04:20 PM    Sodium 135 (L) 04/11/2022 05:31 AM    Potassium 5.1 04/18/2022 04:20 PM    Potassium 3.5 04/11/2022 05:31 AM    Chloride 95 (L) 04/18/2022 04:20 PM    Chloride 104 04/11/2022 05:31 AM    CO2 28 04/18/2022 04:20 PM    CO2 25 04/11/2022 05:31 AM    Anion gap 6 (L) 04/11/2022 05:31 AM    Anion gap 6 (L) 04/10/2022 05:11 AM    Glucose 165 (H) 04/18/2022 04:20 PM    Glucose 170 (H) 04/11/2022 05:31 AM    BUN 12 04/18/2022 04:20 PM    BUN 5 (L) 04/11/2022 05:31 AM    Creatinine 0.86 04/18/2022 04:20 PM    Creatinine 0.60 04/11/2022 05:31 AM    GFR est AA >60 04/11/2022 05:31 AM    GFR est AA >60 04/10/2022 05:11 AM    GFR est non-AA >60 04/11/2022 05:31 AM    GFR est non-AA >60 04/10/2022 05:11 AM    Calcium 8.8 04/18/2022 04:20 PM    Calcium 8.1 (L) 04/11/2022 05:31 AM    Albumin 3.2 (L) 04/18/2022 04:20 PM    Albumin 2.0 (L) 04/11/2022 05:31 AM    Protein, total 6.9 04/18/2022 04:20 PM    Protein, total 6.2 (L) 04/11/2022 05:31 AM    Globulin 4.2 (H) 04/11/2022 05:31 AM    Globulin 3.9 (H) 04/10/2022 05:11 AM    A-G Ratio 0.9 (L) 04/18/2022 04:20 PM    A-G Ratio 0.5 (L) 04/11/2022 05:31 AM    ALT (SGPT) 28 04/18/2022 04:20 PM    ALT (SGPT) 26 04/11/2022 05:31 AM     Lab Results   Component Value Date/Time    WBC 7.6 04/18/2022 04:20 PM    WBC 5.1 04/11/2022 05:31 AM    HGB 12.8 04/18/2022 04:20 PM    HGB 11.8 04/11/2022 05:31 AM    HCT 37.8 04/18/2022 04:20 PM    HCT 34.9 (L) 04/11/2022 05:31 AM    PLATELET 728 93/64/4897 04:20 PM    PLATELET 835 17/01/2797 05:31 AM     Lab Results   Component Value Date/Time    aPTT 30.9 09/24/2021 09:38 AM    Prothrombin time 15.3 (H) 09/24/2021 09:38 AM    INR 1.2 09/24/2021 09:38 AM       Assessment:     CARABALLO, ascites    Hospital Problems  Date Reviewed: 4/18/2022    None          Plan:     Planned Procedure: PeritX drain placement    Risks, benefits, and alternatives reviewed with patient and she agrees to proceed with the procedure.       Signed By: Ragini Rodriguez PA-C     May 5, 2022

## 2022-05-05 NOTE — PROGRESS NOTES
TRANSFER - OUT REPORT:    Verbal report given to [facility] on Santiago Huerta  being transferred to IR prep/recovery room #2 for routine progression of care       Report consisted of patients Situation, Background, Assessment and   Recommendations(SBAR). Information from the following report(s) SBAR, Kardex, Procedure Summary and MAR was reviewed with the receiving nurse. Lines:   Saline Lock 05/05/22 Left;Posterior Forearm (Active)        Opportunity for questions and clarification was provided.       Patient transported with:   Registered Nurse

## 2022-05-05 NOTE — PROGRESS NOTES
Patient to IR procedure room #1/[facility] room for Pleurx placement per CHRISTIE Romero with Dr. Dayne Mcdowell supervising.

## 2022-05-05 NOTE — DISCHARGE INSTRUCTIONS
Daniel [address]  Department of Interventional Radiology  [facility]  [phone] Office  [phone] Fax    GENERAL DRAIN DISCHARGE INSTRUCTIONS    General Information:     A plastic catheter has been inserted through your skin and into area that needs to be drained. Your doctor ordered this procedure to be done in the event of an abscess, or other fluid collection in your body. You will notice a drainage bag attached to the catheter. When the fluid has all been removed, your doctor will send you back to us for the removal of the catheter. If you are going home with the catheter in place, your doctor may order a home health nurse to come to your house and assist with the care of the catheter. Your doctor will most likely order antibiotic tablets for you to take while you have this tube. Home Care Instructions: You can resume your regular diet and medication regimen. Do not drink alcohol, drive, or make any important legal decisions in the next 24 hours. Do not lift anything heavier than a gallon of milk, or do anything strenuous for the next 24 hours. You should not shower for 24 hours. You should cover the tube with plastic wrap and tape to keep it dry when you shower. You can disconnect the drainage bag while showering. The home health nurse or the nurse who discharges from the hospital will teach you how to do this. Do not take a bath, swim or immerse yourself in water as long as you have this drainage tube. You should clean the tube and change the dressing every  48 hours and as needed. Keep the dressing clean and dry. Keep up with how much drainage you get from the tube and report this to your doctor on each visit. Call If:     You should call your Physician and/or the Radiology Nurse if you have any bleeding other than a small spot on your bandage.  Call if you have any signs of infection, fever, or increased pain at the site of the tube. Call if you should have leakage around the tube, an increased yellowing of the skin, increased pain in the abdomen, a change in the amount or appearance of the fluid, or if the tube gets pulled out some or all the way out. Follow-Up Instructions: Please see your ordering doctor as he/she has requested. To Reach Us: If you have any questions about your procedure, please call the Interventional Radiology department at [phone]. After business hours (5pm) and weekends, call the answering service at [phone] and ask for the Radiologist on call to be paged. Si tiene Preguntas acerca del procedimiento, por favor llame al departamento de Radiología Intervencional al [phone]. Después de horas de oficina (5 pm) y los fines de [place], llamar al Arminda Bravo al [phone] y pregunte por el Radiologo de [facility] Monterinani. Interventional Radiology General Nurse Discharge    After general anesthesia or intravenous sedation, for 24 hours or while taking prescription Narcotics:  · Limit your activities  · Do not drive and operate hazardous machinery  · Do not make important personal or business decisions  · Do  not drink alcoholic beverages  · If you have not urinated within 8 hours after discharge, please contact your surgeon on call. * Please give a list of your current medications to your Primary Care Provider. * Please update this list whenever your medications are discontinued, doses are     changed, or new medications (including over-the-counter products) are added. * Please carry medication information at all times in case of emergency situations. These are general instructions for a healthy lifestyle:    No smoking/ No tobacco products/ Avoid exposure to second hand smoke  Surgeon General's Warning:  Quitting smoking now greatly reduces serious risk to your health.     Obesity, smoking, and sedentary lifestyle greatly increases your risk for illness  A healthy diet, regular physical exercise & weight monitoring are important for maintaining a healthy lifestyle    You may be retaining fluid if you have a history of heart failure or if you experience any of the following symptoms:  Weight gain of 3 pounds or more overnight or 5 pounds in a week, increased swelling in our hands or feet or shortness of breath while lying flat in bed. Please call your doctor as soon as you notice any of these symptoms; do not wait until your next office visit. Recognize signs and symptoms of STROKE:  F-face looks uneven    A-arms unable to move or move unevenly    S-speech slurred or non-existent    T-time-call 911 as soon as signs and symptoms begin-DO NOT go       Back to bed or wait to see if you get better-TIME IS BRAIN.

## 2022-05-06 PROCEDURE — 3331090001 HH PPS REVENUE CREDIT

## 2022-05-06 PROCEDURE — 3331090002 HH PPS REVENUE DEBIT

## 2022-05-07 PROBLEM — N39.0 UTI (URINARY TRACT INFECTION): Status: RESOLVED | Noted: 2020-09-29 | Resolved: 2022-05-07

## 2022-05-07 PROCEDURE — 3331090001 HH PPS REVENUE CREDIT

## 2022-05-07 PROCEDURE — 3331090002 HH PPS REVENUE DEBIT

## 2022-05-08 PROCEDURE — 3331090002 HH PPS REVENUE DEBIT

## 2022-05-08 PROCEDURE — 3331090001 HH PPS REVENUE CREDIT

## 2022-05-09 ENCOUNTER — HOME CARE VISIT (OUTPATIENT)
Dept: SCHEDULING | Facility: HOME HEALTH | Age: 72
End: 2022-05-09
Payer: MEDICARE

## 2022-05-09 VITALS
OXYGEN SATURATION: 97 % | TEMPERATURE: 98.2 F | HEART RATE: 72 BPM | RESPIRATION RATE: 14 BRPM | SYSTOLIC BLOOD PRESSURE: 110 MMHG | DIASTOLIC BLOOD PRESSURE: 50 MMHG

## 2022-05-09 PROCEDURE — 3331090002 HH PPS REVENUE DEBIT

## 2022-05-09 PROCEDURE — G0299 HHS/HOSPICE OF RN EA 15 MIN: HCPCS

## 2022-05-09 PROCEDURE — 3331090001 HH PPS REVENUE CREDIT

## 2022-05-10 PROCEDURE — 3331090001 HH PPS REVENUE CREDIT

## 2022-05-10 PROCEDURE — 3331090002 HH PPS REVENUE DEBIT

## 2022-05-11 PROCEDURE — 3331090002 HH PPS REVENUE DEBIT

## 2022-05-11 PROCEDURE — 3331090001 HH PPS REVENUE CREDIT

## 2022-05-12 ENCOUNTER — HOME CARE VISIT (OUTPATIENT)
Dept: HOME HEALTH SERVICES | Facility: HOME HEALTH | Age: 72
End: 2022-05-12
Payer: MEDICARE

## 2022-05-12 ENCOUNTER — HOME CARE VISIT (OUTPATIENT)
Dept: SCHEDULING | Facility: HOME HEALTH | Age: 72
End: 2022-05-12
Payer: MEDICARE

## 2022-05-12 PROCEDURE — 3331090001 HH PPS REVENUE CREDIT

## 2022-05-12 PROCEDURE — 3331090002 HH PPS REVENUE DEBIT

## 2022-05-12 PROCEDURE — A7048 VACUUM DRAIN BOTTLE/TUBE KIT: HCPCS

## 2022-05-12 PROCEDURE — G0299 HHS/HOSPICE OF RN EA 15 MIN: HCPCS

## 2022-05-13 ENCOUNTER — HOME CARE VISIT (OUTPATIENT)
Dept: SCHEDULING | Facility: HOME HEALTH | Age: 72
End: 2022-05-13
Payer: MEDICARE

## 2022-05-13 PROCEDURE — G0299 HHS/HOSPICE OF RN EA 15 MIN: HCPCS

## 2022-05-13 PROCEDURE — 3331090002 HH PPS REVENUE DEBIT

## 2022-05-13 PROCEDURE — 3331090001 HH PPS REVENUE CREDIT

## 2022-05-14 VITALS
DIASTOLIC BLOOD PRESSURE: 60 MMHG | RESPIRATION RATE: 14 BRPM | OXYGEN SATURATION: 97 % | HEART RATE: 67 BPM | TEMPERATURE: 98.1 F | SYSTOLIC BLOOD PRESSURE: 120 MMHG

## 2022-05-14 PROCEDURE — 3331090001 HH PPS REVENUE CREDIT

## 2022-05-14 PROCEDURE — 3331090002 HH PPS REVENUE DEBIT

## 2022-05-15 VITALS
OXYGEN SATURATION: 98 % | SYSTOLIC BLOOD PRESSURE: 110 MMHG | HEART RATE: 70 BPM | TEMPERATURE: 98.3 F | DIASTOLIC BLOOD PRESSURE: 60 MMHG

## 2022-05-15 PROCEDURE — 3331090002 HH PPS REVENUE DEBIT

## 2022-05-15 PROCEDURE — 3331090001 HH PPS REVENUE CREDIT

## 2022-05-16 ENCOUNTER — HOME CARE VISIT (OUTPATIENT)
Dept: SCHEDULING | Facility: HOME HEALTH | Age: 72
End: 2022-05-16
Payer: MEDICARE

## 2022-05-16 VITALS
TEMPERATURE: 98.3 F | SYSTOLIC BLOOD PRESSURE: 110 MMHG | HEART RATE: 71 BPM | RESPIRATION RATE: 16 BRPM | DIASTOLIC BLOOD PRESSURE: 62 MMHG | OXYGEN SATURATION: 96 %

## 2022-05-16 PROCEDURE — G0299 HHS/HOSPICE OF RN EA 15 MIN: HCPCS

## 2022-05-16 PROCEDURE — 3331090002 HH PPS REVENUE DEBIT

## 2022-05-16 PROCEDURE — 3331090001 HH PPS REVENUE CREDIT

## 2022-05-17 ENCOUNTER — HOME CARE VISIT (OUTPATIENT)
Dept: SCHEDULING | Facility: HOME HEALTH | Age: 72
End: 2022-05-17
Payer: MEDICARE

## 2022-05-17 VITALS
RESPIRATION RATE: 16 BRPM | TEMPERATURE: 97.4 F | HEART RATE: 66 BPM | OXYGEN SATURATION: 97 % | SYSTOLIC BLOOD PRESSURE: 112 MMHG | DIASTOLIC BLOOD PRESSURE: 60 MMHG

## 2022-05-17 PROCEDURE — 3331090001 HH PPS REVENUE CREDIT

## 2022-05-17 PROCEDURE — G0299 HHS/HOSPICE OF RN EA 15 MIN: HCPCS

## 2022-05-17 PROCEDURE — 3331090002 HH PPS REVENUE DEBIT

## 2022-05-18 ENCOUNTER — HOME CARE VISIT (OUTPATIENT)
Dept: SCHEDULING | Facility: HOME HEALTH | Age: 72
End: 2022-05-18
Payer: MEDICARE

## 2022-05-18 VITALS
OXYGEN SATURATION: 97 % | TEMPERATURE: 97.9 F | RESPIRATION RATE: 17 BRPM | HEART RATE: 64 BPM | SYSTOLIC BLOOD PRESSURE: 98 MMHG | DIASTOLIC BLOOD PRESSURE: 54 MMHG

## 2022-05-18 PROCEDURE — 3331090001 HH PPS REVENUE CREDIT

## 2022-05-18 PROCEDURE — 3331090002 HH PPS REVENUE DEBIT

## 2022-05-18 PROCEDURE — G0299 HHS/HOSPICE OF RN EA 15 MIN: HCPCS

## 2022-05-19 ENCOUNTER — HOME CARE VISIT (OUTPATIENT)
Dept: SCHEDULING | Facility: HOME HEALTH | Age: 72
End: 2022-05-19
Payer: MEDICARE

## 2022-05-19 PROCEDURE — 3331090002 HH PPS REVENUE DEBIT

## 2022-05-19 PROCEDURE — 3331090001 HH PPS REVENUE CREDIT

## 2022-05-20 ENCOUNTER — HOME CARE VISIT (OUTPATIENT)
Dept: SCHEDULING | Facility: HOME HEALTH | Age: 72
End: 2022-05-20
Payer: MEDICARE

## 2022-05-20 ENCOUNTER — APPOINTMENT (OUTPATIENT)
Dept: LAB | Age: 72
End: 2022-05-20
Payer: MEDICARE

## 2022-05-20 LAB
APPEARANCE FLD: NORMAL
COLOR FLD: NORMAL
LYMPHOCYTES NFR BRONCH MANUAL: 96 %
MACROPHAGES NFR BRONCH MANUAL: 2 %
NEUTROPHILS NFR BRONCH MANUAL: 2 %
NUC CELL # FLD: 234 /CU MM
RBC # FLD: 7000 /CU MM
SPECIMEN SOURCE FLD: NORMAL

## 2022-05-20 PROCEDURE — 87205 SMEAR GRAM STAIN: CPT

## 2022-05-20 PROCEDURE — 400014 HH F/U

## 2022-05-20 PROCEDURE — G0299 HHS/HOSPICE OF RN EA 15 MIN: HCPCS

## 2022-05-20 PROCEDURE — 3331090002 HH PPS REVENUE DEBIT

## 2022-05-20 PROCEDURE — 87070 CULTURE OTHR SPECIMN AEROBIC: CPT

## 2022-05-20 PROCEDURE — 3331090001 HH PPS REVENUE CREDIT

## 2022-05-20 PROCEDURE — 89050 BODY FLUID CELL COUNT: CPT

## 2022-05-21 VITALS
HEART RATE: 72 BPM | OXYGEN SATURATION: 94 % | TEMPERATURE: 97.8 F | SYSTOLIC BLOOD PRESSURE: 78 MMHG | RESPIRATION RATE: 12 BRPM | DIASTOLIC BLOOD PRESSURE: 52 MMHG

## 2022-05-21 PROCEDURE — 3331090002 HH PPS REVENUE DEBIT

## 2022-05-21 PROCEDURE — 3331090001 HH PPS REVENUE CREDIT

## 2022-05-22 LAB
BACTERIA SPEC CULT: NORMAL
GRAM STN SPEC: NORMAL
GRAM STN SPEC: NORMAL
SERVICE CMNT-IMP: NORMAL

## 2022-05-22 PROCEDURE — 3331090002 HH PPS REVENUE DEBIT

## 2022-05-22 PROCEDURE — 3331090001 HH PPS REVENUE CREDIT

## 2022-05-23 ENCOUNTER — HOME CARE VISIT (OUTPATIENT)
Dept: SCHEDULING | Facility: HOME HEALTH | Age: 72
End: 2022-05-23
Payer: MEDICARE

## 2022-05-23 VITALS
DIASTOLIC BLOOD PRESSURE: 58 MMHG | RESPIRATION RATE: 16 BRPM | TEMPERATURE: 98.5 F | HEART RATE: 65 BPM | SYSTOLIC BLOOD PRESSURE: 110 MMHG | OXYGEN SATURATION: 97 %

## 2022-05-23 PROCEDURE — 3331090002 HH PPS REVENUE DEBIT

## 2022-05-23 PROCEDURE — G0299 HHS/HOSPICE OF RN EA 15 MIN: HCPCS

## 2022-05-23 PROCEDURE — 3331090001 HH PPS REVENUE CREDIT

## 2022-05-24 ENCOUNTER — HOME CARE VISIT (OUTPATIENT)
Dept: SCHEDULING | Facility: HOME HEALTH | Age: 72
End: 2022-05-24
Payer: MEDICARE

## 2022-05-24 ENCOUNTER — OFFICE VISIT (OUTPATIENT)
Dept: FAMILY MEDICINE CLINIC | Facility: CLINIC | Age: 72
End: 2022-05-24
Payer: MEDICARE

## 2022-05-24 ENCOUNTER — HOSPITAL ENCOUNTER (EMERGENCY)
Dept: CT IMAGING | Age: 72
Discharge: HOME | DRG: 920 | End: 2022-05-27
Payer: MEDICARE

## 2022-05-24 ENCOUNTER — HOSPITAL ENCOUNTER (INPATIENT)
Age: 72
LOS: 4 days | Discharge: HOME HEALTH | DRG: 920 | End: 2022-05-28
Attending: EMERGENCY MEDICINE | Admitting: FAMILY MEDICINE
Payer: MEDICARE

## 2022-05-24 VITALS
BODY MASS INDEX: 29.53 KG/M2 | HEIGHT: 64 IN | HEART RATE: 86 BPM | OXYGEN SATURATION: 94 % | WEIGHT: 173 LBS | SYSTOLIC BLOOD PRESSURE: 112 MMHG | TEMPERATURE: 97 F | DIASTOLIC BLOOD PRESSURE: 60 MMHG

## 2022-05-24 DIAGNOSIS — L03.311 ABDOMINAL WALL CELLULITIS: Primary | ICD-10-CM

## 2022-05-24 DIAGNOSIS — F32.A DEPRESSION, UNSPECIFIED DEPRESSION TYPE: ICD-10-CM

## 2022-05-24 DIAGNOSIS — R35.0 URINARY FREQUENCY: ICD-10-CM

## 2022-05-24 DIAGNOSIS — K74.60 LIVER CIRRHOSIS SECONDARY TO NASH (NONALCOHOLIC STEATOHEPATITIS) (HCC): ICD-10-CM

## 2022-05-24 DIAGNOSIS — L03.311 CELLULITIS OF ABDOMINAL WALL: Primary | ICD-10-CM

## 2022-05-24 DIAGNOSIS — K75.81 LIVER CIRRHOSIS SECONDARY TO NASH (NONALCOHOLIC STEATOHEPATITIS) (HCC): ICD-10-CM

## 2022-05-24 PROBLEM — K56.609 SBO (SMALL BOWEL OBSTRUCTION) (HCC): Status: RESOLVED | Noted: 2022-04-06 | Resolved: 2022-05-24

## 2022-05-24 PROBLEM — E78.5 DYSLIPIDEMIA: Status: ACTIVE | Noted: 2020-06-30

## 2022-05-24 PROBLEM — R11.2 NAUSEA & VOMITING: Status: RESOLVED | Noted: 2021-04-26 | Resolved: 2022-05-24

## 2022-05-24 PROBLEM — E87.1 HYPONATREMIA: Status: ACTIVE | Noted: 2021-04-26

## 2022-05-24 PROBLEM — E87.6 HYPOKALEMIA: Status: RESOLVED | Noted: 2020-09-29 | Resolved: 2022-05-24

## 2022-05-24 PROBLEM — I25.10 CAD (CORONARY ARTERY DISEASE): Status: ACTIVE | Noted: 2022-04-07

## 2022-05-24 PROBLEM — G43.701 CHRONIC MIGRAINE WITHOUT AURA WITH STATUS MIGRAINOSUS, NOT INTRACTABLE: Status: ACTIVE | Noted: 2017-06-22

## 2022-05-24 PROBLEM — I85.00 ESOPHAGEAL VARICES DETERMINED BY ENDOSCOPY (HCC): Status: ACTIVE | Noted: 2021-12-08

## 2022-05-24 LAB
ALBUMIN SERPL-MCNC: 2.1 G/DL (ref 3.2–4.6)
ALBUMIN/GLOB SERPL: 0.5 {RATIO} (ref 1.2–3.5)
ALP SERPL-CCNC: 214 U/L (ref 50–136)
ALT SERPL-CCNC: 37 U/L (ref 12–65)
ANION GAP SERPL CALC-SCNC: 6 MMOL/L (ref 7–16)
AST SERPL-CCNC: 43 U/L (ref 15–37)
BASOPHILS # BLD: 0 K/UL (ref 0–0.2)
BASOPHILS NFR BLD: 1 % (ref 0–2)
BILIRUB SERPL-MCNC: 0.6 MG/DL (ref 0.2–1.1)
BUN SERPL-MCNC: 26 MG/DL (ref 8–23)
CALCIUM SERPL-MCNC: 8.6 MG/DL (ref 8.3–10.4)
CHLORIDE SERPL-SCNC: 95 MMOL/L (ref 98–107)
CO2 SERPL-SCNC: 29 MMOL/L (ref 21–32)
CREAT SERPL-MCNC: 0.82 MG/DL (ref 0.6–1)
DIFFERENTIAL METHOD BLD: ABNORMAL
EOSINOPHIL # BLD: 0.1 K/UL (ref 0–0.8)
EOSINOPHIL NFR BLD: 2 % (ref 0.5–7.8)
ERYTHROCYTE [DISTWIDTH] IN BLOOD BY AUTOMATED COUNT: 14.6 % (ref 11.9–14.6)
GLOBULIN SER CALC-MCNC: 4.6 G/DL (ref 2.3–3.5)
GLUCOSE BLD STRIP.AUTO-MCNC: 322 MG/DL (ref 65–100)
GLUCOSE SERPL-MCNC: 478 MG/DL (ref 65–100)
HCT VFR BLD AUTO: 33.1 % (ref 35.8–46.3)
HGB BLD-MCNC: 11.1 G/DL (ref 11.7–15.4)
IMM GRANULOCYTES # BLD AUTO: 0.1 K/UL (ref 0–0.5)
IMM GRANULOCYTES NFR BLD AUTO: 1 % (ref 0–5)
LACTATE SERPL-SCNC: 1.4 MMOL/L (ref 0.4–2)
LIPASE SERPL-CCNC: 318 U/L (ref 73–393)
LYMPHOCYTES # BLD: 0.8 K/UL (ref 0.5–4.6)
LYMPHOCYTES NFR BLD: 15 % (ref 13–44)
MCH RBC QN AUTO: 28 PG (ref 26.1–32.9)
MCHC RBC AUTO-ENTMCNC: 33.5 G/DL (ref 31.4–35)
MCV RBC AUTO: 83.4 FL (ref 79.6–97.8)
MONOCYTES # BLD: 0.7 K/UL (ref 0.1–1.3)
MONOCYTES NFR BLD: 15 % (ref 4–12)
NEUTS SEG # BLD: 3.4 K/UL (ref 1.7–8.2)
NEUTS SEG NFR BLD: 67 % (ref 43–78)
NRBC # BLD: 0 K/UL (ref 0–0.2)
PLATELET # BLD AUTO: 188 K/UL (ref 150–450)
PMV BLD AUTO: 10.9 FL (ref 9.4–12.3)
POTASSIUM SERPL-SCNC: 4.5 MMOL/L (ref 3.5–5.1)
PROCALCITONIN SERPL-MCNC: 0.17 NG/ML (ref 0–0.49)
PROT SERPL-MCNC: 6.7 G/DL (ref 6.3–8.2)
RBC # BLD AUTO: 3.97 M/UL (ref 4.05–5.2)
SERVICE CMNT-IMP: ABNORMAL
SODIUM SERPL-SCNC: 130 MMOL/L (ref 136–145)
WBC # BLD AUTO: 5.1 K/UL (ref 4.3–11.1)

## 2022-05-24 PROCEDURE — 80053 COMPREHEN METABOLIC PANEL: CPT

## 2022-05-24 PROCEDURE — 3331090001 HH PPS REVENUE CREDIT

## 2022-05-24 PROCEDURE — G8417 CALC BMI ABV UP PARAM F/U: HCPCS | Performed by: STUDENT IN AN ORGANIZED HEALTH CARE EDUCATION/TRAINING PROGRAM

## 2022-05-24 PROCEDURE — G8400 PT W/DXA NO RESULTS DOC: HCPCS | Performed by: STUDENT IN AN ORGANIZED HEALTH CARE EDUCATION/TRAINING PROGRAM

## 2022-05-24 PROCEDURE — 3331090002 HH PPS REVENUE DEBIT

## 2022-05-24 PROCEDURE — 96375 TX/PRO/DX INJ NEW DRUG ADDON: CPT

## 2022-05-24 PROCEDURE — 1090F PRES/ABSN URINE INCON ASSESS: CPT | Performed by: STUDENT IN AN ORGANIZED HEALTH CARE EDUCATION/TRAINING PROGRAM

## 2022-05-24 PROCEDURE — 2580000003 HC RX 258: Performed by: EMERGENCY MEDICINE

## 2022-05-24 PROCEDURE — 96365 THER/PROPH/DIAG IV INF INIT: CPT

## 2022-05-24 PROCEDURE — 6360000004 HC RX CONTRAST MEDICATION: Performed by: FAMILY MEDICINE

## 2022-05-24 PROCEDURE — 6360000002 HC RX W HCPCS: Performed by: FAMILY MEDICINE

## 2022-05-24 PROCEDURE — 99214 OFFICE O/P EST MOD 30 MIN: CPT | Performed by: STUDENT IN AN ORGANIZED HEALTH CARE EDUCATION/TRAINING PROGRAM

## 2022-05-24 PROCEDURE — 82962 GLUCOSE BLOOD TEST: CPT

## 2022-05-24 PROCEDURE — 83690 ASSAY OF LIPASE: CPT

## 2022-05-24 PROCEDURE — 6370000000 HC RX 637 (ALT 250 FOR IP): Performed by: EMERGENCY MEDICINE

## 2022-05-24 PROCEDURE — G0299 HHS/HOSPICE OF RN EA 15 MIN: HCPCS

## 2022-05-24 PROCEDURE — 96367 TX/PROPH/DG ADDL SEQ IV INF: CPT

## 2022-05-24 PROCEDURE — 1123F ACP DISCUSS/DSCN MKR DOCD: CPT | Performed by: STUDENT IN AN ORGANIZED HEALTH CARE EDUCATION/TRAINING PROGRAM

## 2022-05-24 PROCEDURE — 74177 CT ABD & PELVIS W/CONTRAST: CPT

## 2022-05-24 PROCEDURE — 6360000002 HC RX W HCPCS: Performed by: EMERGENCY MEDICINE

## 2022-05-24 PROCEDURE — 87040 BLOOD CULTURE FOR BACTERIA: CPT

## 2022-05-24 PROCEDURE — 2580000003 HC RX 258: Performed by: FAMILY MEDICINE

## 2022-05-24 PROCEDURE — 85025 COMPLETE CBC W/AUTO DIFF WBC: CPT

## 2022-05-24 PROCEDURE — 1036F TOBACCO NON-USER: CPT | Performed by: STUDENT IN AN ORGANIZED HEALTH CARE EDUCATION/TRAINING PROGRAM

## 2022-05-24 PROCEDURE — 6370000000 HC RX 637 (ALT 250 FOR IP): Performed by: FAMILY MEDICINE

## 2022-05-24 PROCEDURE — 83605 ASSAY OF LACTIC ACID: CPT

## 2022-05-24 PROCEDURE — 3017F COLORECTAL CA SCREEN DOC REV: CPT | Performed by: STUDENT IN AN ORGANIZED HEALTH CARE EDUCATION/TRAINING PROGRAM

## 2022-05-24 PROCEDURE — 84145 PROCALCITONIN (PCT): CPT

## 2022-05-24 PROCEDURE — 3331090003 HH PPS REVENUE ADJ

## 2022-05-24 PROCEDURE — 1100000000 HC RM PRIVATE

## 2022-05-24 PROCEDURE — 99285 EMERGENCY DEPT VISIT HI MDM: CPT

## 2022-05-24 PROCEDURE — G8427 DOCREV CUR MEDS BY ELIG CLIN: HCPCS | Performed by: STUDENT IN AN ORGANIZED HEALTH CARE EDUCATION/TRAINING PROGRAM

## 2022-05-24 RX ORDER — BUMETANIDE 1 MG/1
1 TABLET ORAL 3 TIMES DAILY
Status: DISCONTINUED | OUTPATIENT
Start: 2022-05-24 | End: 2022-05-28 | Stop reason: HOSPADM

## 2022-05-24 RX ORDER — ASPIRIN 81 MG/1
81 TABLET, CHEWABLE ORAL DAILY
Status: DISCONTINUED | OUTPATIENT
Start: 2022-05-25 | End: 2022-05-28 | Stop reason: HOSPADM

## 2022-05-24 RX ORDER — LANOLIN ALCOHOL/MO/W.PET/CERES
400 CREAM (GRAM) TOPICAL 2 TIMES DAILY
Status: DISCONTINUED | OUTPATIENT
Start: 2022-05-24 | End: 2022-05-28 | Stop reason: HOSPADM

## 2022-05-24 RX ORDER — SPIRONOLACTONE 25 MG/1
100 TABLET ORAL DAILY
Status: DISCONTINUED | OUTPATIENT
Start: 2022-05-25 | End: 2022-05-28 | Stop reason: HOSPADM

## 2022-05-24 RX ORDER — PANTOPRAZOLE SODIUM 40 MG/1
40 TABLET, DELAYED RELEASE ORAL 2 TIMES DAILY
Status: DISCONTINUED | OUTPATIENT
Start: 2022-05-24 | End: 2022-05-28 | Stop reason: HOSPADM

## 2022-05-24 RX ORDER — ENOXAPARIN SODIUM 100 MG/ML
40 INJECTION SUBCUTANEOUS EVERY 24 HOURS
Status: DISCONTINUED | OUTPATIENT
Start: 2022-05-25 | End: 2022-05-28 | Stop reason: HOSPADM

## 2022-05-24 RX ORDER — ZINC SULFATE 50(220)MG
50 CAPSULE ORAL DAILY
Status: DISCONTINUED | OUTPATIENT
Start: 2022-05-25 | End: 2022-05-28 | Stop reason: HOSPADM

## 2022-05-24 RX ORDER — ACETAMINOPHEN 325 MG/1
650 TABLET ORAL EVERY 6 HOURS PRN
Status: DISCONTINUED | OUTPATIENT
Start: 2022-05-24 | End: 2022-05-28 | Stop reason: HOSPADM

## 2022-05-24 RX ORDER — ACETAMINOPHEN 500 MG
1000 TABLET ORAL EVERY 6 HOURS PRN
Status: DISCONTINUED | OUTPATIENT
Start: 2022-05-24 | End: 2022-05-28 | Stop reason: HOSPADM

## 2022-05-24 RX ORDER — FERROUS SULFATE 325(65) MG
325 TABLET ORAL
Status: DISCONTINUED | OUTPATIENT
Start: 2022-05-25 | End: 2022-05-28 | Stop reason: HOSPADM

## 2022-05-24 RX ORDER — ONDANSETRON 4 MG/1
4 TABLET, ORALLY DISINTEGRATING ORAL EVERY 8 HOURS PRN
Status: DISCONTINUED | OUTPATIENT
Start: 2022-05-24 | End: 2022-05-28 | Stop reason: HOSPADM

## 2022-05-24 RX ORDER — ESCITALOPRAM OXALATE 10 MG/1
5 TABLET ORAL DAILY
Status: DISCONTINUED | OUTPATIENT
Start: 2022-05-25 | End: 2022-05-28 | Stop reason: HOSPADM

## 2022-05-24 RX ORDER — INSULIN LISPRO 100 [IU]/ML
0-4 INJECTION, SOLUTION INTRAVENOUS; SUBCUTANEOUS NIGHTLY
Status: DISCONTINUED | OUTPATIENT
Start: 2022-05-24 | End: 2022-05-28 | Stop reason: HOSPADM

## 2022-05-24 RX ORDER — INSULIN LISPRO 100 [IU]/ML
20 INJECTION, SOLUTION INTRAVENOUS; SUBCUTANEOUS
Status: DISCONTINUED | OUTPATIENT
Start: 2022-05-25 | End: 2022-05-25 | Stop reason: SDUPTHER

## 2022-05-24 RX ORDER — LACTULOSE 10 G/15ML
30 SOLUTION ORAL 3 TIMES DAILY
Status: DISCONTINUED | OUTPATIENT
Start: 2022-05-24 | End: 2022-05-28 | Stop reason: HOSPADM

## 2022-05-24 RX ORDER — SODIUM CHLORIDE 9 MG/ML
INJECTION, SOLUTION INTRAVENOUS PRN
Status: DISCONTINUED | OUTPATIENT
Start: 2022-05-24 | End: 2022-05-28 | Stop reason: HOSPADM

## 2022-05-24 RX ORDER — ACETAMINOPHEN 650 MG/1
650 SUPPOSITORY RECTAL EVERY 6 HOURS PRN
Status: DISCONTINUED | OUTPATIENT
Start: 2022-05-24 | End: 2022-05-28 | Stop reason: HOSPADM

## 2022-05-24 RX ORDER — PRAVASTATIN SODIUM 20 MG
40 TABLET ORAL NIGHTLY
Status: DISCONTINUED | OUTPATIENT
Start: 2022-05-24 | End: 2022-05-28 | Stop reason: HOSPADM

## 2022-05-24 RX ORDER — ONDANSETRON 2 MG/ML
4 INJECTION INTRAMUSCULAR; INTRAVENOUS EVERY 6 HOURS PRN
Status: DISCONTINUED | OUTPATIENT
Start: 2022-05-24 | End: 2022-05-28 | Stop reason: HOSPADM

## 2022-05-24 RX ORDER — SODIUM CHLORIDE 0.9 % (FLUSH) 0.9 %
5-40 SYRINGE (ML) INJECTION PRN
Status: DISCONTINUED | OUTPATIENT
Start: 2022-05-24 | End: 2022-05-28 | Stop reason: HOSPADM

## 2022-05-24 RX ORDER — DEXTROSE MONOHYDRATE 50 MG/ML
100 INJECTION, SOLUTION INTRAVENOUS PRN
Status: DISCONTINUED | OUTPATIENT
Start: 2022-05-24 | End: 2022-05-24 | Stop reason: SDUPTHER

## 2022-05-24 RX ORDER — DEXTROSE MONOHYDRATE 50 MG/ML
100 INJECTION, SOLUTION INTRAVENOUS PRN
Status: DISCONTINUED | OUTPATIENT
Start: 2022-05-24 | End: 2022-05-28 | Stop reason: HOSPADM

## 2022-05-24 RX ORDER — INSULIN LISPRO 100 [IU]/ML
0-16 INJECTION, SOLUTION INTRAVENOUS; SUBCUTANEOUS
Status: DISCONTINUED | OUTPATIENT
Start: 2022-05-25 | End: 2022-05-28 | Stop reason: HOSPADM

## 2022-05-24 RX ORDER — SODIUM CHLORIDE 0.9 % (FLUSH) 0.9 %
5-40 SYRINGE (ML) INJECTION EVERY 12 HOURS SCHEDULED
Status: DISCONTINUED | OUTPATIENT
Start: 2022-05-24 | End: 2022-05-28 | Stop reason: HOSPADM

## 2022-05-24 RX ORDER — POLYETHYLENE GLYCOL 3350 17 G/17G
17 POWDER, FOR SOLUTION ORAL DAILY PRN
Status: DISCONTINUED | OUTPATIENT
Start: 2022-05-24 | End: 2022-05-28 | Stop reason: HOSPADM

## 2022-05-24 RX ORDER — PROPRANOLOL HYDROCHLORIDE 40 MG/1
20 TABLET ORAL 2 TIMES DAILY
Status: DISCONTINUED | OUTPATIENT
Start: 2022-05-24 | End: 2022-05-28 | Stop reason: HOSPADM

## 2022-05-24 RX ORDER — MIDODRINE HYDROCHLORIDE 5 MG/1
5 TABLET ORAL
Status: DISCONTINUED | OUTPATIENT
Start: 2022-05-25 | End: 2022-05-28 | Stop reason: HOSPADM

## 2022-05-24 RX ORDER — ESCITALOPRAM OXALATE 5 MG/1
5 TABLET ORAL DAILY
Qty: 30 TABLET | Refills: 1 | Status: SHIPPED | OUTPATIENT
Start: 2022-05-24 | End: 2022-07-15

## 2022-05-24 RX ORDER — TROSPIUM CHLORIDE 20 MG/1
20 TABLET, FILM COATED ORAL 2 TIMES DAILY
Status: DISCONTINUED | OUTPATIENT
Start: 2022-05-25 | End: 2022-05-28 | Stop reason: HOSPADM

## 2022-05-24 RX ORDER — SODIUM CHLORIDE 9 MG/ML
100 INJECTION, SOLUTION INTRAVENOUS CONTINUOUS
Status: CANCELLED | OUTPATIENT
Start: 2022-05-24

## 2022-05-24 RX ORDER — INSULIN GLARGINE 100 [IU]/ML
42 INJECTION, SOLUTION SUBCUTANEOUS 2 TIMES DAILY
Status: DISCONTINUED | OUTPATIENT
Start: 2022-05-24 | End: 2022-05-26

## 2022-05-24 RX ADMIN — CEFEPIME HYDROCHLORIDE 2000 MG: 2 INJECTION, POWDER, FOR SOLUTION INTRAVENOUS at 18:39

## 2022-05-24 RX ADMIN — VANCOMYCIN HYDROCHLORIDE 1500 MG: 10 INJECTION, POWDER, LYOPHILIZED, FOR SOLUTION INTRAVENOUS at 19:09

## 2022-05-24 RX ADMIN — SODIUM CHLORIDE, PRESERVATIVE FREE 10 ML: 5 INJECTION INTRAVENOUS at 23:51

## 2022-05-24 RX ADMIN — PIPERACILLIN AND TAZOBACTAM 3375 MG: 3; .375 INJECTION, POWDER, LYOPHILIZED, FOR SOLUTION INTRAVENOUS at 23:49

## 2022-05-24 RX ADMIN — Medication 400 MG: at 23:47

## 2022-05-24 RX ADMIN — PANTOPRAZOLE SODIUM 40 MG: 40 TABLET, DELAYED RELEASE ORAL at 23:47

## 2022-05-24 RX ADMIN — BUMETANIDE 1 MG: 1 TABLET ORAL at 23:47

## 2022-05-24 RX ADMIN — INSULIN LISPRO 4 UNITS: 100 INJECTION, SOLUTION INTRAVENOUS; SUBCUTANEOUS at 23:48

## 2022-05-24 RX ADMIN — RIFAXIMIN 550 MG: 550 TABLET ORAL at 23:47

## 2022-05-24 RX ADMIN — IOPAMIDOL 100 ML: 755 INJECTION, SOLUTION INTRAVENOUS at 20:03

## 2022-05-24 RX ADMIN — INSULIN HUMAN 6 UNITS: 100 INJECTION, SOLUTION PARENTERAL at 19:15

## 2022-05-24 RX ADMIN — INSULIN GLARGINE 42 UNITS: 100 INJECTION, SOLUTION SUBCUTANEOUS at 23:48

## 2022-05-24 RX ADMIN — PRAVASTATIN SODIUM 40 MG: 20 TABLET ORAL at 23:47

## 2022-05-24 ASSESSMENT — PAIN SCALES - GENERAL
PAINLEVEL_OUTOF10: 0

## 2022-05-24 ASSESSMENT — ENCOUNTER SYMPTOMS
EYE REDNESS: 0
WHEEZING: 0
ABDOMINAL DISTENTION: 1
ABDOMINAL PAIN: 1
NAUSEA: 0
SHORTNESS OF BREATH: 0
ABDOMINAL PAIN: 0
SORE THROAT: 0
VOMITING: 0
COLOR CHANGE: 1
COUGH: 0
DIARRHEA: 0
VOMITING: 0

## 2022-05-24 ASSESSMENT — PAIN - FUNCTIONAL ASSESSMENT
PAIN_FUNCTIONAL_ASSESSMENT: NONE - DENIES PAIN
PAIN_FUNCTIONAL_ASSESSMENT: 0-10
PAIN_FUNCTIONAL_ASSESSMENT: NONE - DENIES PAIN

## 2022-05-24 NOTE — ED PROVIDER NOTES
[facility] Emergency Department Provider Note                   PCP:                Sandhya Serna MD               Age: 66 y.o. Sex: female     No diagnosis found. DISPOSITION         New Prescriptions    No medications on file       Orders Placed This Encounter   Procedures    Culture, Blood 1    Culture, Blood 1    CBC with Auto Differential    Comprehensive Metabolic Panel    Lipase    Procalcitonin    Lactic Acid    Pharmacy to Dose: Vancomycin; Skin and Soft Tissue Infection; Other; ??        MDM  Number of Diagnoses or Management Options  Cellulitis of abdominal wall  Diagnosis management comments: I will do a sepsis evaluation empirically start cefepime and Christy Thrasher is a 66 y.o. female who presents to the Emergency Department with chief complaint of    Chief Complaint   Patient presents with    Skin Problem    Abdominal Pain      75-year-old lady with a history of nonalcoholic steatohepatitis presents with concerns about abdominal wall redness. She says she saw her primary care doctor who was concerned about a likely abdominal wall cellulitis so she was sent here for further evaluation. She does have a Pleurx drain in her abdomen to enable easy paracentesis. She says when she is at the doctor they drained out not quite 1 L earlier this afternoon but she remained very distended despite that. She said no recent fevers or chills. She had no nausea vomiting or diarrhea. Says the skin is tender but her abdomen in general does not hurt. No other associated symptoms. Elements of this note were created using speech recognition software. As such, errors of speech recognition may be present. Review of Systems   Constitutional: Negative for activity change, chills and fever. HENT: Negative for congestion and sore throat. Eyes: Negative for redness and visual disturbance. Respiratory: Negative for cough, shortness of breath and wheezing. Cardiovascular: Negative for chest pain and palpitations. Gastrointestinal: Negative for abdominal pain, diarrhea, nausea and vomiting. Endocrine: Negative for polyuria. Genitourinary: Negative for flank pain and hematuria. Skin: Positive for color change. Negative for rash and wound. Allergic/Immunologic: Negative for immunocompromised state. Neurological: Negative for dizziness, light-headedness and headaches. Hematological: Negative for adenopathy. Psychiatric/Behavioral: Negative for confusion. Past Medical History:   Diagnosis Date    Cirrhosis ([facility] [address]     Diabetes ([facility] [address]         Past Surgical History:   Procedure Laterality Date    HERNIA REPAIR  2022    IR INSERT TUNNELED PLEURA CATH W CUFF  5/5/2022    IR INSERT TUNNELED PLEURA CATH W CUFF  5/5/2022    IR INSERT TUNNELED PLEURA CATH W CUFF 5/5/2022 [facility] RADIOLOG[facility] SPECI[facility]    IR US GUIDED PARACENTESIS W IMAGING  11/22/2021    IR US GUIDED PARACENTESIS W IMAGING  10/28/2021    IR US GUIDED PARACENTESIS W IMAGING  10/4/2021    IR US GUIDED PARACENTESIS W IMAGING  4/28/2022    IR US GUIDED PARACENTESIS W IMAGING  4/20/2022    IR US GUIDED PARACENTESIS W IMAGING  4/12/2022    IR US GUIDED PARACENTESIS W IMAGING  4/6/2022    IR US GUIDED PARACENTESIS W IMAGING  3/16/2022    IR US GUIDED PARACENTESIS W IMAGING  1/31/2022    IR US GUIDED PARACENTESIS W IMAGING  3/25/2022    SC ABDOMEN SURGERY [address] UNLISTED          History reviewed. No pertinent family history. Social Connections:     Frequency of Communication with Friends and Family: Not on file    Frequency of Social Gatherings with Friends and Family: Not on file    Attends Scientology Services: Not on file    Active Member of Clubs or Organizations: Not on file    Attends Club or Organization Meetings: Not on file    Marital Status: Not on file        No Known Allergies     Vitals signs and nursing note reviewed.    Patient Vitals for the past 4 hrs:   Temp Pulse Resp BP SpO2   05/24/22 1737 99.4 °F (37.4 °C) 78 18 (!) 115/58 97 %          Physical Exam  Vitals and nursing note reviewed. Constitutional:       Appearance: Normal appearance. HENT:      Head: Normocephalic and atraumatic. Cardiovascular:      Rate and Rhythm: Normal rate. Pulses: Normal pulses. Pulmonary:      Effort: Pulmonary effort is normal.      Breath sounds: Normal breath sounds. Abdominal:      Comments: Fluid-filled distended abdomen with diffuse abdominal wall erythema and mild induration   Musculoskeletal:      Comments: 2 Plus edema in both lower extremities. Neurological:      General: No focal deficit present. Mental Status: She is alert and oriented to person, place, and time. Procedures    Labs Reviewed   CBC WITH AUTO DIFFERENTIAL - Abnormal; Notable for the following components:       Result Value    RBC 3.97 (*)     Hemoglobin 11.1 (*)     Hematocrit 33.1 (*)     Monocytes 15 (*)     All other components within normal limits   CULTURE, BLOOD 1   CULTURE, BLOOD 1   COMPREHENSIVE METABOLIC PANEL   LIPASE   PROCALCITONIN   LACTIC ACID        No orders to display            [place] Coma Scale  Eye Opening: Spontaneous  Best Verbal Response: Oriented  Best Motor Response: Obeys commands  [place] Coma Scale Score: 15               ED Course as of 05/24/22 1945   Tue May 24, 2022   1945 Given her exam findings and her health issues I do think she would benefit from admission for IV antibiotics. I will discuss the case with the hospitalist.    I spoke with the hospitalist who kindly agreed to see the patient. Celeste Rebolledo      ED Course User Index  [AC] Ole Gonzalez MD        Voice dictation software was used during the making of this note. This software is not perfect and grammatical and other typographical errors may be present. This note has not been completely proofread for errors.        Ole Gonzalez MD  05/24/22 1559

## 2022-05-24 NOTE — PROGRESS NOTES
[facility]  Shantelle Alves  Phone [phone]  Fax:  [phone]    Bjorn Lechuga (:  1950) is a 66 y.o. female here for evaluation of the following chief complaint(s):  Depression (discuss prozac), Discuss Medications (who is responsible for meds?), and Abdominal Pain (recent placement of abd pleurx-- redness /itching-- )       ASSESSMENT/PLAN:  1. Abdominal wall cellulitis  2. Liver cirrhosis secondary to LOWERY (nonalcoholic steatohepatitis) ([facility] [address]  -     External Referral To Hepatology  3. Depression, unspecified depression type  4. Urinary frequency    Spreading erythema over abdominal wall. Concern for cellulitis. Patient's sister also reports that her ascites fluid being drained has been more pink and cloudy in appearance. She did have her ascites fluid cultured  and it has shown no growth. Will send patient to ED for further evaluation/management. Likely will need to check ascites fluid again to make sure she does not have SBP. Recently tapered off Prozac but still feeling depressed. Will start low-dose Lexapro 5 mg nightly. Myrbetriq sample provided today for overactive bladder. Will refer to another GI doctor for management of liver cirrhosis per patient/sister request.    Return in about 4 weeks (around 2022) for routine f/u. Subjective   SUBJECTIVE/OBJECTIVE:  HPI   70-year-old female with liver cirrhosis presents for 1 month follow-up of depression and diabetes.  -sister reports ascitic fluid has gotten progressively more pink/cloudy  -sugars have been higher the past week or so  -poor appetite  -tapered off prozac a few days ago, still feeling depressed  -has been off Myrbetriq for past month, has noticed a difference with urinary frequency  -sister and patient would like referral to another GI doc for management of her liver cirrhosis    Review of Systems   Constitutional: Positive for appetite change. Negative for fever. Gastrointestinal: Positive for abdominal distention and abdominal pain. Negative for vomiting. Objective     Vitals:    05/24/22 1434   BP: 112/60   Pulse: 86   Temp: 97 °F (36.1 °C)   SpO2: 94%       Physical Exam  Vitals reviewed. Constitutional:       General: She is not in acute distress. Appearance: She is obese. HENT:      Head: Normocephalic and atraumatic. Cardiovascular:      Rate and Rhythm: Normal rate and regular rhythm. Pulmonary:      Effort: Pulmonary effort is normal.      Breath sounds: Normal breath sounds. Abdominal:      General: There is distension. Tenderness: There is no abdominal tenderness. Musculoskeletal:      Right lower leg: No edema. Left lower leg: No edema. Skin:     General: Skin is warm and dry. Comments: Erythema across lower/mid abdomen   Neurological:      General: No focal deficit present. Mental Status: She is alert and oriented to person, place, and time. An electronic signature was used to authenticate this note.     --Kassi Mcdonald MD

## 2022-05-24 NOTE — ED TRIAGE NOTES
Pt was seen for routine checkup at PCP and was sent here for potential cellulitis. Pt had drain placed for paracentesis at home about 2 weeks ago and has redness across abdomen that PCP was concerned about.  Pt endorses abdominal pain yesterday that is resolved

## 2022-05-25 ENCOUNTER — HOME CARE VISIT (OUTPATIENT)
Dept: HOME HEALTH SERVICES | Facility: HOME HEALTH | Age: 72
End: 2022-05-25
Payer: MEDICARE

## 2022-05-25 LAB
ALBUMIN FLD-MCNC: 0.6 G/DL
ALBUMIN SERPL-MCNC: 2 G/DL (ref 3.2–4.6)
ALBUMIN/GLOB SERPL: 0.4 {RATIO} (ref 1.2–3.5)
ALP SERPL-CCNC: 198 U/L (ref 50–130)
ALT SERPL-CCNC: 31 U/L (ref 12–65)
ANION GAP SERPL CALC-SCNC: 4 MMOL/L (ref 7–16)
AST SERPL-CCNC: 38 U/L (ref 15–37)
BASOPHILS # BLD: 0.1 K/UL (ref 0–0.2)
BASOPHILS NFR BLD: 1 % (ref 0–2)
BILIRUB SERPL-MCNC: 0.8 MG/DL (ref 0.2–1.1)
BUN SERPL-MCNC: 23 MG/DL (ref 8–23)
CALCIUM SERPL-MCNC: 9 MG/DL (ref 8.3–10.4)
CHLORIDE SERPL-SCNC: 99 MMOL/L (ref 98–107)
CO2 SERPL-SCNC: 32 MMOL/L (ref 21–32)
CREAT SERPL-MCNC: 1.02 MG/DL (ref 0.6–1)
DIFFERENTIAL METHOD BLD: ABNORMAL
EOSINOPHIL # BLD: 0.1 K/UL (ref 0–0.8)
EOSINOPHIL NFR BLD: 3 % (ref 0.5–7.8)
ERYTHROCYTE [DISTWIDTH] IN BLOOD BY AUTOMATED COUNT: 14.5 % (ref 11.9–14.6)
EST. AVERAGE GLUCOSE BLD GHB EST-MCNC: 189 MG/DL
GLOBULIN SER CALC-MCNC: 4.7 G/DL (ref 2.3–3.5)
GLUCOSE BLD STRIP.AUTO-MCNC: 130 MG/DL (ref 65–100)
GLUCOSE BLD STRIP.AUTO-MCNC: 266 MG/DL (ref 65–100)
GLUCOSE BLD STRIP.AUTO-MCNC: 343 MG/DL (ref 65–100)
GLUCOSE BLD STRIP.AUTO-MCNC: 93 MG/DL (ref 65–100)
GLUCOSE SERPL-MCNC: 388 MG/DL (ref 65–100)
HBA1C MFR BLD: 8.2 % (ref 4.2–6.3)
HCT VFR BLD AUTO: 33.1 % (ref 35.8–46.3)
HGB BLD-MCNC: 10.8 G/DL (ref 11.7–15.4)
IMM GRANULOCYTES # BLD AUTO: 0.1 K/UL (ref 0–0.5)
IMM GRANULOCYTES NFR BLD AUTO: 1 % (ref 0–5)
LDH FLD L TO P-CCNC: 75 U/L
LYMPHOCYTES # BLD: 0.6 K/UL (ref 0.5–4.6)
LYMPHOCYTES NFR BLD: 12 % (ref 13–44)
MCH RBC QN AUTO: 27.6 PG (ref 26.1–32.9)
MCHC RBC AUTO-ENTMCNC: 32.6 G/DL (ref 31.4–35)
MCV RBC AUTO: 84.4 FL (ref 79.6–97.8)
MONOCYTES # BLD: 0.8 K/UL (ref 0.1–1.3)
MONOCYTES NFR BLD: 16 % (ref 4–12)
NEUTS SEG # BLD: 3.3 K/UL (ref 1.7–8.2)
NEUTS SEG NFR BLD: 67 % (ref 43–78)
NRBC # BLD: 0 K/UL (ref 0–0.2)
PLATELET # BLD AUTO: 171 K/UL (ref 150–450)
PMV BLD AUTO: 10.5 FL (ref 9.4–12.3)
POTASSIUM SERPL-SCNC: 5 MMOL/L (ref 3.5–5.1)
PROT FLD-MCNC: 1.7 G/DL
PROT SERPL-MCNC: 6.7 G/DL (ref 6.3–8.2)
RBC # BLD AUTO: 3.92 M/UL (ref 4.05–5.2)
SERVICE CMNT-IMP: ABNORMAL
SERVICE CMNT-IMP: NORMAL
SODIUM SERPL-SCNC: 135 MMOL/L (ref 136–145)
SPECIMEN SOURCE FLD: NORMAL
WBC # BLD AUTO: 4.9 K/UL (ref 4.3–11.1)

## 2022-05-25 PROCEDURE — 85025 COMPLETE CBC W/AUTO DIFF WBC: CPT

## 2022-05-25 PROCEDURE — 82962 GLUCOSE BLOOD TEST: CPT

## 2022-05-25 PROCEDURE — 2580000003 HC RX 258: Performed by: FAMILY MEDICINE

## 2022-05-25 PROCEDURE — 89050 BODY FLUID CELL COUNT: CPT

## 2022-05-25 PROCEDURE — 82042 OTHER SOURCE ALBUMIN QUAN EA: CPT

## 2022-05-25 PROCEDURE — 3331090001 HH PPS REVENUE CREDIT

## 2022-05-25 PROCEDURE — 36415 COLL VENOUS BLD VENIPUNCTURE: CPT

## 2022-05-25 PROCEDURE — 87205 SMEAR GRAM STAIN: CPT

## 2022-05-25 PROCEDURE — 80053 COMPREHEN METABOLIC PANEL: CPT

## 2022-05-25 PROCEDURE — 6370000000 HC RX 637 (ALT 250 FOR IP): Performed by: FAMILY MEDICINE

## 2022-05-25 PROCEDURE — 1100000000 HC RM PRIVATE

## 2022-05-25 PROCEDURE — 2580000003 HC RX 258: Performed by: INTERNAL MEDICINE

## 2022-05-25 PROCEDURE — 3331090002 HH PPS REVENUE DEBIT

## 2022-05-25 PROCEDURE — 6360000002 HC RX W HCPCS: Performed by: INTERNAL MEDICINE

## 2022-05-25 PROCEDURE — 83615 LACTATE (LD) (LDH) ENZYME: CPT

## 2022-05-25 PROCEDURE — 83036 HEMOGLOBIN GLYCOSYLATED A1C: CPT

## 2022-05-25 PROCEDURE — 82945 GLUCOSE OTHER FLUID: CPT

## 2022-05-25 PROCEDURE — 6360000002 HC RX W HCPCS: Performed by: FAMILY MEDICINE

## 2022-05-25 PROCEDURE — 6370000000 HC RX 637 (ALT 250 FOR IP): Performed by: INTERNAL MEDICINE

## 2022-05-25 PROCEDURE — 84157 ASSAY OF PROTEIN OTHER: CPT

## 2022-05-25 RX ORDER — INSULIN LISPRO 100 [IU]/ML
15 INJECTION, SOLUTION INTRAVENOUS; SUBCUTANEOUS
Status: DISCONTINUED | OUTPATIENT
Start: 2022-05-25 | End: 2022-05-26

## 2022-05-25 RX ORDER — INSULIN LISPRO 100 [IU]/ML
20 INJECTION, SOLUTION INTRAVENOUS; SUBCUTANEOUS
Status: DISCONTINUED | OUTPATIENT
Start: 2022-05-25 | End: 2022-05-25

## 2022-05-25 RX ADMIN — SODIUM CHLORIDE, PRESERVATIVE FREE 10 ML: 5 INJECTION INTRAVENOUS at 08:43

## 2022-05-25 RX ADMIN — INSULIN LISPRO 12 UNITS: 100 INJECTION, SOLUTION INTRAVENOUS; SUBCUTANEOUS at 08:18

## 2022-05-25 RX ADMIN — Medication 400 MG: at 08:16

## 2022-05-25 RX ADMIN — ESCITALOPRAM OXALATE 5 MG: 10 TABLET ORAL at 08:13

## 2022-05-25 RX ADMIN — BUMETANIDE 1 MG: 1 TABLET ORAL at 20:33

## 2022-05-25 RX ADMIN — INSULIN GLARGINE 42 UNITS: 100 INJECTION, SOLUTION SUBCUTANEOUS at 08:17

## 2022-05-25 RX ADMIN — PROPRANOLOL HYDROCHLORIDE 20 MG: 40 TABLET ORAL at 08:16

## 2022-05-25 RX ADMIN — RIFAXIMIN 550 MG: 550 TABLET ORAL at 20:33

## 2022-05-25 RX ADMIN — MIDODRINE HYDROCHLORIDE 5 MG: 5 TABLET ORAL at 17:25

## 2022-05-25 RX ADMIN — BUMETANIDE 1 MG: 1 TABLET ORAL at 15:24

## 2022-05-25 RX ADMIN — INSULIN LISPRO 20 UNITS: 100 INJECTION, SOLUTION INTRAVENOUS; SUBCUTANEOUS at 12:10

## 2022-05-25 RX ADMIN — MIDODRINE HYDROCHLORIDE 5 MG: 5 TABLET ORAL at 12:12

## 2022-05-25 RX ADMIN — INSULIN LISPRO 15 UNITS: 100 INJECTION, SOLUTION INTRAVENOUS; SUBCUTANEOUS at 17:27

## 2022-05-25 RX ADMIN — PRAVASTATIN SODIUM 40 MG: 20 TABLET ORAL at 20:33

## 2022-05-25 RX ADMIN — PIPERACILLIN AND TAZOBACTAM 3375 MG: 3; .375 INJECTION, POWDER, LYOPHILIZED, FOR SOLUTION INTRAVENOUS at 15:24

## 2022-05-25 RX ADMIN — RIFAXIMIN 550 MG: 550 TABLET ORAL at 08:12

## 2022-05-25 RX ADMIN — ASPIRIN 81 MG 81 MG: 81 TABLET ORAL at 08:12

## 2022-05-25 RX ADMIN — Medication 400 MG: at 20:34

## 2022-05-25 RX ADMIN — PANTOPRAZOLE SODIUM 40 MG: 40 TABLET, DELAYED RELEASE ORAL at 08:14

## 2022-05-25 RX ADMIN — PANTOPRAZOLE SODIUM 40 MG: 40 TABLET, DELAYED RELEASE ORAL at 20:33

## 2022-05-25 RX ADMIN — Medication 50 MG: at 08:17

## 2022-05-25 RX ADMIN — ENOXAPARIN SODIUM 40 MG: 100 INJECTION SUBCUTANEOUS at 08:14

## 2022-05-25 RX ADMIN — TROSPIUM CHLORIDE 20 MG: 20 TABLET, FILM COATED ORAL at 08:13

## 2022-05-25 RX ADMIN — ACETAMINOPHEN 650 MG: 325 TABLET ORAL at 01:14

## 2022-05-25 RX ADMIN — BUMETANIDE 1 MG: 1 TABLET ORAL at 08:13

## 2022-05-25 RX ADMIN — TROSPIUM CHLORIDE 20 MG: 20 TABLET, FILM COATED ORAL at 20:33

## 2022-05-25 RX ADMIN — MIDODRINE HYDROCHLORIDE 5 MG: 5 TABLET ORAL at 08:13

## 2022-05-25 RX ADMIN — VANCOMYCIN HYDROCHLORIDE 1500 MG: 10 INJECTION, POWDER, LYOPHILIZED, FOR SOLUTION INTRAVENOUS at 18:43

## 2022-05-25 RX ADMIN — PIPERACILLIN AND TAZOBACTAM 3375 MG: 3; .375 INJECTION, POWDER, LYOPHILIZED, FOR SOLUTION INTRAVENOUS at 08:18

## 2022-05-25 RX ADMIN — SPIRONOLACTONE 100 MG: 25 TABLET ORAL at 08:12

## 2022-05-25 RX ADMIN — PIPERACILLIN AND TAZOBACTAM 3375 MG: 3; .375 INJECTION, POWDER, LYOPHILIZED, FOR SOLUTION INTRAVENOUS at 23:55

## 2022-05-25 RX ADMIN — INSULIN GLARGINE 42 UNITS: 100 INJECTION, SOLUTION SUBCUTANEOUS at 20:44

## 2022-05-25 RX ADMIN — FERROUS SULFATE TAB 325 MG (65 MG ELEMENTAL FE) 325 MG: 325 (65 FE) TAB at 05:30

## 2022-05-25 ASSESSMENT — PAIN SCALES - GENERAL
PAINLEVEL_OUTOF10: 1
PAINLEVEL_OUTOF10: 0
PAINLEVEL_OUTOF10: 0
PAINLEVEL_OUTOF10: 3
PAINLEVEL_OUTOF10: 0

## 2022-05-25 ASSESSMENT — PAIN DESCRIPTION - LOCATION: LOCATION: ABDOMEN;GENERALIZED

## 2022-05-25 ASSESSMENT — PAIN DESCRIPTION - DESCRIPTORS: DESCRIPTORS: ACHING

## 2022-05-25 ASSESSMENT — PAIN - FUNCTIONAL ASSESSMENT: PAIN_FUNCTIONAL_ASSESSMENT: ACTIVITIES ARE NOT PREVENTED

## 2022-05-25 NOTE — ED NOTES
TRANSFER - OUT REPORT:    Verbal report given to Anthony on Chey Jorgensen  being transferred to [id number] for routine progression of patient care       Report consisted of patient's Situation, Background, Assessment and   Recommendations(SBAR). Information from the following report(s) Nurse Handoff Report, Index, ED Encounter Summary, ED SBAR, [facility] and Recent Results was reviewed with the receiving nurse. Lines:   Peripheral IV 05/24/22 Right Arm (Active)   Site Assessment Clean, dry & intact 05/24/22 1839   Line Status Blood return noted 05/24/22 1839   Phlebitis Assessment No symptoms 05/24/22 1839   Infiltration Assessment 0 05/24/22 1839   Alcohol Cap Used Yes 05/24/22 1839        Opportunity for questions and clarification was provided.       Patient transported with:  Registered Nurse     Neptali Santo RN  05/24/22 8582

## 2022-05-25 NOTE — FLOWSHEET NOTE
05/24/22 2220   Dual Clinician Skin Assessment   Dual Skin Assessment (4 Eyes) X   Second Clinical  (First and Last Name) Isa Whiteside RN   Skin Integumentary    Skin Integumentary (WDL) X   Skin Color Pale   Skin Condition/Temp Warm   Skin Integrity Incision (see LDA); Scars (comment); Redness  (Pigtail Catheter in place RLQ, umbilical scar from hernia sx)

## 2022-05-25 NOTE — PROGRESS NOTES
[address] [facility] Pharmacokinetic Monitoring Service - Vancomycin     Arden Deshpande is a 66 y.o. female starting on vancomycin therapy for Cellulitis - abdominal skin and soft tissue infection  Pharmacy consulted by Dr Ren Richards  for monitoring and adjustment. Target Concentration: Goal AUC/YANELIS 400-600 mg*hr/L    Additional Antimicrobials: Piperacillin-Tazobactam   Pertinent Laboratory Values: Wt Readings from Last 1 Encounters:   05/24/22 173 lb 1 oz (78.5 kg)     Temp Readings from Last 1 Encounters:   05/24/22 99.1 °F (37.3 °C) (Oral)     Recent Labs     05/24/22  1819   BUN 26*   CREATININE 0.82   WBC 5.1     Estimated Creatinine Clearance: 61 mL/min (based on SCr of 0.82 mg/dL). No results found for: Catrina Ruggiero    MRSA Nasal Swab: N/A. Non-respiratory infection. .    Plan:  Dosing recommendations based on Bayesian software  Start vancomycin Vancomycin 1500 mg IVPB every 24 hours  Anticipated AUC of 470 and trough concentration of 13.0  at steady state  Renal labs as indicated   Vancomycin concentrations will be ordered as clinically appropriate          By Andria Mc staff  Pharmacy will continue to monitor patient and adjust therapy as indicated    Thank you for the consult,  Debbie Lyons, [facility]

## 2022-05-25 NOTE — H&P
[facility] Hospitalist Initial History and Physical Note    Patient: Calixto Womack Date: 5/24/2022  female, 66 y.o. Admit Date: 5/24/2022  Attending: Shanon Ferguson MD     ASSESSMENT AND PLAN:     Principal Problem:    Abdominal wall cellulitis  Plan: IV Vanc/Zosyn. Follow clinically. Blood cultures pending. Active Problems:    Hyponatremia  Plan: IV NS, follow BMP. Dyslipidemia  Plan: Stable. Continue home meds. Chronic migraine without aura with status migrainosus, not intractable  Plan: stable. Esophageal varices determined by endoscopy ([facility] [address]  Plan: Stable      RADHA (obstructive sleep apnea)  Plan: Stable. Type II diabetes mellitus ([facility] [address]  Plan: Stable. Continue home meds. SSI      Liver cirrhosis secondary to LOWERY (nonalcoholic steatohepatitis) ([facility] [address]  Plan: Stable. Ascites present on CT. Follow clinically. CAD (coronary artery disease)  Plan: Stable. Continue home meds. DVT Prophylaxis: Lovenox      Code Status: FULL CODE      Disposition: Admit to med/surg for evaluation and treatment as per above.       Anticipated discharge: 2-3 days     CHIEF COMPLAINT:  Abdominal wall pain, redness    HISTORY OF PRESENT ILLNESS:      Patient Active Problem List   Diagnosis    Hiatal hernia    DJD (degenerative joint disease) of cervical spine    Presence of drug-eluting stent in left circumflex coronary artery    Intractable pain    Left foot drop    Dyslipidemia    Biceps tendinitis of right shoulder    Chronic migraine without aura with status migrainosus, not intractable    Esophageal varices determined by endoscopy (HCC)    Sciatica of left side    Superior glenoid labrum lesion of right shoulder    Chronic abdominal pain    Complete tear of right rotator cuff    RADHA (obstructive sleep apnea)    Atherosclerosis of coronary artery    Chest pain    Chronic diarrhea    Hyponatremia    Impingement syndrome of right shoulder    Anemia due to blood loss    Visual disturbance mg by mouth every morning (before breakfast)  insulin glargine (LANTUS;BASAGLAR) 100 UNIT/ML injection pen, Inject 42 Units into the skin 2 times daily  insulin lispro, 1 Unit Dial, 100 UNIT/ML SOPN, Inject 20 Units into the skin 3 times daily (before meals)  lactulose (CHRONULAC) 10 GM/15ML solution, Take 30 mLs by mouth 3 times daily (Patient not taking: Reported on 5/24/2022)  magnesium oxide (MAG-OX) 400 (240 Mg) MG tablet, Take 400 mg by mouth 2 times daily  midodrine (PROAMATINE) 5 MG tablet, Take 5 mg by mouth 3 times daily (with meals)  mirabegron (MYRBETRIQ) 25 MG TB24, TAKE 1 TABLET BY MOUTH EVERY DAY  ondansetron (ZOFRAN-ODT) 4 MG disintegrating tablet, Take 4 mg by mouth  pantoprazole (PROTONIX) 40 MG tablet, Take 40 mg by mouth 2 times daily  pravastatin (PRAVACHOL) 40 MG tablet, Take 40 mg by mouth  propranolol (INDERAL) 20 MG tablet, Take 20 mg by mouth 2 times daily  rifAXIMin (XIFAXAN) 550 MG tablet, Take 550 mg by mouth 2 times daily  spironolactone (ALDACTONE) 100 MG tablet, Take 100 mg by mouth daily  Zinc Sulfate 220 (50 Zn) MG TABS, Take 1 tablet by mouth daily     Past Medical History   Past Medical History:   Diagnosis Date    Cirrhosis ([facility] [address]     Diabetes ([facility] [address]        Past Surgical History:   Procedure Laterality Date    HERNIA REPAIR  2022    IR INSERT TUNNELED PLEURA CATH W CUFF  5/5/2022    IR INSERT TUNNELED PLEURA CATH W CUFF  5/5/2022    IR INSERT TUNNELED PLEURA CATH W CUFF 5/5/2022 [facility] SPE[facility]    IR US GUIDED PARACENTESIS W IMAGING  11/22/2021    IR US GUIDED PARACENTESIS W IMAGING  10/28/2021    IR US GUIDED PARACENTESIS W IMAGING  10/4/2021    IR US GUIDED PARACENTESIS W IMAGING  4/28/2022    IR US GUIDED PARACENTESIS W IMAGING  4/20/2022    IR US GUIDED PARACENTESIS W IMAGING  4/12/2022    IR US GUIDED PARACENTESIS W IMAGING  4/6/2022    IR US GUIDED PARACENTESIS W IMAGING  3/16/2022    IR US GUIDED PARACENTESIS W IMAGING  1/31/2022    IR US GUIDED PARACENTESIS W IMAGING  3/25/2022    SC ABDOMEN SURGERY PROC UNLISTED         Social History   Social History     Socioeconomic History    Marital status:      Spouse name: None    Number of children: None    Years of education: None    Highest education level: None   Occupational History    None   Tobacco Use    Smoking status: Never Smoker    Smokeless tobacco: Never Used   Vaping Use    Vaping Use: Never used   Substance and Sexual Activity    Alcohol use: Not Currently    Drug use: Never    Sexual activity: Not Currently   Other Topics Concern    None   Social History Narrative    None     Social Determinants of Health     Financial Resource Strain:     Difficulty of Paying Living Expenses: Not on file   Food Insecurity:     Worried About Running Out of Food in the Last Year: Not on file    Derek of Food in the Last Year: Not on file   Transportation Needs:     Lack of Transportation (Medical): Not on file    Lack of Transportation (Non-Medical): Not on file   Physical Activity:     Days of Exercise per Week: Not on file    Minutes of Exercise per Session: Not on file   Stress:     Feeling of Stress : Not on file   Social Connections:     Frequency of Communication with Friends and Family: Not on file    Frequency of Social Gatherings with Friends and Family: Not on file    Attends [employer] Services: Not on file    Active Member of [address] [place] [address] [employer] or Organizations: Not on file    Attends Club or Organization Meetings: Not on file    Marital Status: Not on file   Intimate Partner Violence:     Fear of Current or Ex-Partner: Not on file    Emotionally Abused: Not on file    Physically Abused: Not on file    Sexually Abused: Not on file   Housing Stability:     Unable to Pay for Housing in the Last Year: Not on file    Number of [place] in the Last Year: Not on file    Unstable Housing in the Last Year: Not on file       Family History:   History reviewed.  No pertinent family history. REVIEW OF SYSTEMS:   A 14 point review of systems was taken and pertinent positive as per HPI. PHYSICAL EXAMINATION:  Vital 24 Hour Range Most Recent Value  Temperature Temp  Min: 97 °F (36.1 °C)  Max: 99.4 °F (37.4 °C) 99.1 °F (37.3 °C)    Pulse Pulse  Min: 74  Max: 86 75  Respiratory Resp  Min: 16  Max: 18 18  Blood Pressure BP  Min: 102/54  Max: 115/58 112/78  Pulse Oximetry SpO2  Min: 94 %  Max: 98 % 98 %  O2 No data recorded      Vital Most Recent Value First Value  Weight 173 lb 1 oz (78.5 kg) Weight: 173 lb (78.5 kg)  Height 5' 3\" (160 cm) Height: 5' 4\" (162.6 cm)  BMI 30.7 N/A    Physical Exam:   General:     No acute distress, speaking in full sentences. Eyes:   No palpebral pallor or scleral icterus. ENT:   External auricular and nasal exam within normal limits. Cardiovascular: No cyanosis or edema of extremities. Respiratory:    No respiratory distress or accessory muscle use. Gastrointestinal:   Not actively vomiting, abdomen non-distended   Skin:      Normal color. Edematous, erythematous abdominal skin. Nontender to touch. Neurologic:  CN II-XII grossly intact and symmetrical.      Moving all four extremities well with no focal deficits. Psychiatric:  Appropriate affect.  Alert       Intake/Output last 3 shifts:    Intake/Output Summary (Last 24 hours) at 5/24/2022 2224  Last data filed at 5/24/2022 1900  Gross per 24 hour   Intake 50 ml   Output --   Net 50 ml        Labs:  Lab Results   Component Value Date     (L) 05/24/2022    K 4.5 05/24/2022    CL 95 (L) 05/24/2022    CO2 29 05/24/2022    BUN 26 (H) 05/24/2022    CREATININE 0.82 05/24/2022    GLUCOSE 478 (HH) 05/24/2022    CALCIUM 8.6 05/24/2022    PROT 6.7 05/24/2022    LABALBU 2.1 (L) 05/24/2022    BILITOT 0.6 05/24/2022    ALKPHOS 214 (H) 05/24/2022    AST 43 (H) 05/24/2022    ALT 37 05/24/2022    LABGLOM >60 05/24/2022    GFRAA >60 05/24/2022    AGRATIO 0.9 (L) 04/18/2022    GLOB 4.6 (H) 05/24/2022      No results found for: MG  Lab Results   Component Value Date    CALCIUM 8.6 05/24/2022        Lab Results   Component Value Date    WBC 5.1 05/24/2022    HGB 11.1 (L) 05/24/2022    HCT 33.1 (L) 05/24/2022    MCV 83.4 05/24/2022     05/24/2022        Lab Results   Component Value Date    INR 1.2 09/24/2021    PROTIME 15.3 (H) 09/24/2021        No results found for: CKTOTAL, CKMB, CKMBINDEX, TROPONINI     Imagining & Other Studies  CT ABDOMEN PELVIS W IV CONTRAST Additional Contrast? None  Narrative: CT of the Abdomen and Pelvis with contrast    CLINICAL INDICATION:  Subacute worsening severe pain and swelling throughout the  abdominal wall with cellulitis, evaluate for abscess. Generalized abdominal  distention. Cirrhosis, ascites, 6 4 hypertension, biliary dilatation. Follow-up  small bowel obstruction and umbilical hernia. COMPARISON: CT 4/6/2022, radiograph 4/7/2022. TECHNIQUE: Dose reduction technique used: Automated exposure Control and/or  adjustment of mA and kV according to patient size. Multiple axial images were  obtained through the abdomen and pelvis after intravenous injection of 125cc of  isovue 370. No oral contrast given per request, limiting evaluation of GI tract  and surrounding structures. Coronal reformatted images obtained for further  evaluation of organs. FINDINGS: Partially included lung bases are unchanged. Partially visualized  heart is enlarged, with scattered calcifications. Hiatal hernia and  paraesophageal varices have not changed. Abdomen: Cirrhosis, splenomegaly, dilatation of portal vein and splenic veins,  extensive upper abdominal varices, cholecystectomy are again evident. No acute  changes in the liver or spleen. The adrenal glands and pancreas appear  unremarkable. There is normal enhancement of the kidneys, no hydronephrosis. Large volume ascites has slightly increased. Diffuse subcutaneous and skin  thickening or noted throughout the body wall.  Patent major vessels. Aorta normal  caliber. No free air. No interval lymphadenopathy. Drainage catheter inserted via right lower quadrant approach with tip in the  left midabdomen. There is no evidence of bowel obstruction. GI evaluation is limited without oral  contrast. Large volume stool compatible with constipation. No bowel hernia. Pelvis:  No acute inflammatory changes or fluid collections in the pelvis. No  lymphadenopathy or mass. Uterus and ovaries are present. Urinary bladder is  unremarkable as seen. Bones: No acute osseous lesions. Impression: 1. Previous ventral hernia and small bowel obstruction have resolved. 2. No evidence of abscess. 3. Ascites and anasarca. 4. Cirrhosis, portal hypertension. 5. Cholecystectomy. 6. Constipation. GI details are limited without oral contrast.       No results found for this or any previous visit (from the past 4464 hour(s)).      Jean Milian MD  5/24/2022 10:24 PM

## 2022-05-25 NOTE — PROGRESS NOTES
Hospitalist Progress Note   Admit Date:  2022  5:38 PM   Name:  Génesis Munoz   Age:  66 y.o. Sex:  female  :  1950   MRN:  [id number]   Room:  [facility]/    Presenting Complaint: Skin Problem and Abdominal Pain     Reason(s) for Admission: Cellulitis of abdominal wall [L03.311]  Abdominal wall cellulitis [N58.726]     Hospital Course & Interval History:   Please refer to the admission H&P for details of presentation. In summary, Génesis Munoz is a 66 y.o. 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 Patient had 800cc of fluids removed from her abdomen via pluerex on . Subjective/24 hr Events (22) : Patient is seen and examined at bedside. No acute events reported overnight by nursing staff. Reports feeling ok. Sister at bedside who reports worsening abdominal distension with redness. Initially thought that it was due to tegaderm around the plurex but the redness had spread across her entire abdomen. Patient denies fever, chills, chest pains, shortness of breath, n/v, abdominal pain. Review of Systems: 10 point review of systems is otherwise negative with the exception of the elements mentioned above. Assessment & Plan:   Abdominal wall cellulitis  In setting of frequent ascitic fluid drainage via pleur-ex drain.   - broad spectrum abx with vancomycin and zosyn  - check blood cultures  - removed 500cc of ascitic fluid via her pleurex drain today. Sent into Lab for analysis. Dyslipidemia: on prvastatin    Type II diabetes mellitus (HCC)  - lantus 42U BID  - ISS  - humalog 20U TIDAC    Liver cirrhosis secondary to LOWERY with Ascites  Esophageal varices   - on bumex 1mg TID, spironolactone, lactulose, propanolol, rifaximin      Diet:  ADULT DIET;  Regular; 3 carb choices (45 gm/meal)  DVT PPx: lovenox  Code status: Full Code      I have reviewed and ordered clinical lab tests and independently visualized images, tracing. I spent 45 minutes of time caring for this patient at bedside or nearby, more than 50 percent of which was spent on coordination of care and/or counseling regarding the disease process, treatment options, and treatment plan. Discussed with patient's sister at bedside. Hospital Problems           Last Modified POA    * (Principal) Abdominal wall cellulitis 5/24/2022 Yes    Dyslipidemia 5/24/2022 Yes    Chronic migraine without aura with status migrainosus, not intractable 5/24/2022 Yes    Esophageal varices determined by endoscopy ([facility] 5/24/2022 Yes    RADHA (obstructive sleep apnea) 5/24/2022 Yes    Hyponatremia 5/24/2022 Yes    Type II diabetes mellitus ([facility] [address] 5/24/2022 Yes    Liver cirrhosis secondary to LOWERY (nonalcoholic steatohepatitis) ([facility] 5/24/2022 Yes    CAD (coronary artery disease) 5/24/2022 Yes            Objective:     Patient Vitals for the past 24 hrs:   Temp Pulse Resp BP SpO2   05/25/22 1511 98.6 °F (37 °C) 75 16 118/71 99 %   05/25/22 1107 97.8 °F (36.6 °C) 72 18 115/72 98 %   05/25/22 0705 97.7 °F (36.5 °C) 71 17 (!) 146/73 --   05/25/22 0336 98.6 °F (37 °C) 67 16 (!) 112/54 98 %   05/24/22 2206 99.1 °F (37.3 °C) 75 18 112/78 98 %   05/24/22 2106 98 °F (36.7 °C) 75 16 (!) 102/54 95 %   05/24/22 1927 99.4 °F (37.4 °C) 74 18 (!) 110/56 98 %   05/24/22 1737 99.4 °F (37.4 °C) 78 18 (!) 115/58 97 %       Estimated body mass index is 30.66 kg/m² as calculated from the following:    Height as of this encounter: 5' 3\" (1.6 m). Weight as of this encounter: 173 lb 1 oz (78.5 kg). Intake/Output Summary (Last 24 hours) at 5/25/2022 1615  Last data filed at 5/25/2022 1302  Gross per 24 hour   Intake 420 ml   Output --   Net 420 ml         Physical Exam:     Blood pressure 118/71, pulse 75, temperature 98.6 °F (37 °C), temperature source Oral, resp.  rate 16, height 5' 3\" (1.6 m), weight 173 lb 1 oz (78.5 kg), SpO2 99 %. General:    Chronically ill appearing. No overt distress  Head:  Normocephalic, atraumatic  Eyes:  Sclerae appear normal.  Pupils equally round. ENT:  Nares appear normal, no drainage. Moist oral mucosa  Neck:  No restricted ROM. Trachea midline   CV:   RRR. No m/r/g. No jugular venous distension. Lungs:   CTAB. No wheezing. Respirations even, unlabored  Abdomen: Bowel sounds present. Non tender, distended and slightly firm. +pleurex drain Warmth/erythema consistent with cellulitis seen on abdominal wall. Extremities: No cyanosis or clubbing. No edema  Skin:     No rashes and normal coloration. Warm and dry. Neuro:  CN II-XII grossly intact. Sensation intact. A&Ox3  Psych:  Normal mood and affect.       I have reviewed ordered lab tests and independently visualized imaging below:    Recent Labs:  Recent Results (from the past 48 hour(s))   CBC with Auto Differential    Collection Time: 05/24/22  6:19 PM   Result Value Ref Range    WBC 5.1 4.3 - 11.1 K/uL    RBC 3.97 (L) 4.05 - 5.2 M/uL    Hemoglobin 11.1 (L) 11.7 - 15.4 g/dL    Hematocrit 33.1 (L) 35.8 - 46.3 %    MCV 83.4 79.6 - 97.8 FL    MCH 28.0 26.1 - 32.9 PG    MCHC 33.5 31.4 - 35.0 g/dL    RDW 14.6 11.9 - 14.6 %    Platelets 623 029 - 977 K/uL    MPV 10.9 9.4 - 12.3 FL    nRBC 0.00 0.0 - 0.2 K/uL    Differential Type AUTOMATED      Seg Neutrophils 67 43 - 78 %    Lymphocytes 15 13 - 44 %    Monocytes 15 (H) 4.0 - 12.0 %    Eosinophils % 2 0.5 - 7.8 %    Basophils 1 0.0 - 2.0 %    Immature Granulocytes 1 0.0 - 5.0 %    Segs Absolute 3.4 1.7 - 8.2 K/UL    Absolute Lymph # 0.8 0.5 - 4.6 K/UL    Absolute Mono # 0.7 0.1 - 1.3 K/UL    Absolute Eos # 0.1 0.0 - 0.8 K/UL    Basophils Absolute 0.0 0.0 - 0.2 K/UL    Absolute Immature Granulocyte 0.1 0.0 - 0.5 K/UL   Comprehensive Metabolic Panel    Collection Time: 05/24/22  6:19 PM   Result Value Ref Range    Sodium 130 (L) 136 - 145 mmol/L Potassium 4.5 3.5 - 5.1 mmol/L    Chloride 95 (L) 98 - 107 mmol/L    CO2 29 21 - 32 mmol/L    Anion Gap 6 (L) 7 - 16 mmol/L    Glucose 478 (HH) 65 - 100 mg/dL    BUN 26 (H) 8 - 23 MG/DL    CREATININE 0.82 0.6 - 1.0 MG/DL    GFR African American >60 >60 ml/min/1.73m2    GFR Non- >60 >60 ml/min/1.73m2    Calcium 8.6 8.3 - 10.4 MG/DL    Total Bilirubin 0.6 0.2 - 1.1 MG/DL    ALT 37 12 - 65 U/L    AST 43 (H) 15 - 37 U/L    Alk Phosphatase 214 (H) 50 - 136 U/L    Total Protein 6.7 6.3 - 8.2 g/dL    Albumin 2.1 (L) 3.2 - 4.6 g/dL    Globulin 4.6 (H) 2.3 - 3.5 g/dL    Albumin/Globulin Ratio 0.5 (L) 1.2 - 3.5     Lipase    Collection Time: 05/24/22  6:19 PM   Result Value Ref Range    Lipase 318 73 - 393 U/L   Procalcitonin    Collection Time: 05/24/22  6:19 PM   Result Value Ref Range    Procalcitonin 0.17 0.00 - 0.49 ng/mL   Lactic Acid    Collection Time: 05/24/22  6:19 PM   Result Value Ref Range    Lactic Acid, Plasma 1.4 0.4 - 2.0 MMOL/L   POCT Glucose    Collection Time: 05/24/22 10:32 PM   Result Value Ref Range    POC Glucose 322 (H) 65 - 100 mg/dL    Performed by: [facility]    Comprehensive Metabolic Panel w/ Reflex to MG    Collection Time: 05/25/22  4:01 AM   Result Value Ref Range    Sodium 135 (L) 136 - 145 mmol/L    Potassium 5.0 3.5 - 5.1 mmol/L    Chloride 99 98 - 107 mmol/L    CO2 32 21 - 32 mmol/L    Anion Gap 4 (L) 7 - 16 mmol/L    Glucose 388 (H) 65 - 100 mg/dL    BUN 23 8 - 23 MG/DL    CREATININE 1.02 (H) 0.6 - 1.0 MG/DL    GFR African American >60 >60 ml/min/1.73m2    GFR Non- 56 (L) >60 ml/min/1.73m2    Calcium 9.0 8.3 - 10.4 MG/DL    Total Bilirubin 0.8 0.2 - 1.1 MG/DL    ALT 31 12 - 65 U/L    AST 38 (H) 15 - 37 U/L    Alk Phosphatase 198 (H) 50 - 130 U/L    Total Protein 6.7 6.3 - 8.2 g/dL    Albumin 2.0 (L) 3.2 - 4.6 g/dL    Globulin 4.7 (H) 2.3 - 3.5 g/dL    Albumin/Globulin Ratio 0.4 (L) 1.2 - 3.5     CBC with Auto Differential    Collection Time: 05/25/22  4:01 AM   Result Value Ref Range    WBC 4.9 4.3 - 11.1 K/uL    RBC 3.92 (L) 4.05 - 5.2 M/uL    Hemoglobin 10.8 (L) 11.7 - 15.4 g/dL    Hematocrit 33.1 (L) 35.8 - 46.3 %    MCV 84.4 79.6 - 97.8 FL    MCH 27.6 26.1 - 32.9 PG    MCHC 32.6 31.4 - 35.0 g/dL    RDW 14.5 11.9 - 14.6 %    Platelets 474 317 - 201 K/uL    MPV 10.5 9.4 - 12.3 FL    nRBC 0.00 0.0 - 0.2 K/uL    Seg Neutrophils 67 43 - 78 %    Lymphocytes 12 (L) 13 - 44 %    Monocytes 16 (H) 4.0 - 12.0 %    Eosinophils % 3 0.5 - 7.8 %    Basophils 1 0.0 - 2.0 %    Immature Granulocytes 1 0.0 - 5.0 %    Segs Absolute 3.3 1.7 - 8.2 K/UL    Absolute Lymph # 0.6 0.5 - 4.6 K/UL    Absolute Mono # 0.8 0.1 - 1.3 K/UL    Absolute Eos # 0.1 0.0 - 0.8 K/UL    Basophils Absolute 0.1 0.0 - 0.2 K/UL    Absolute Immature Granulocyte 0.1 0.0 - 0.5 K/UL    Differential Type AUTOMATED     Hemoglobin A1c    Collection Time: 05/25/22  4:01 AM   Result Value Ref Range    Hemoglobin A1C 8.2 (H) 4.20 - 6.30 %    eAG 189 mg/dL   POCT Glucose    Collection Time: 05/25/22  5:55 AM   Result Value Ref Range    POC Glucose 343 (H) 65 - 100 mg/dL    Performed by: Deann    POCT Glucose    Collection Time: 05/25/22 11:49 AM   Result Value Ref Range    POC Glucose 266 (H) 65 - 100 mg/dL    Performed by: Catina Escobedo          Other Studies:  CT ABDOMEN PELVIS W IV CONTRAST Additional Contrast? None    Result Date: 5/24/2022  CT of the Abdomen and Pelvis with contrast CLINICAL INDICATION:  Subacute worsening severe pain and swelling throughout the abdominal wall with cellulitis, evaluate for abscess. Generalized abdominal distention. Cirrhosis, ascites, 6 4 hypertension, biliary dilatation. Follow-up small bowel obstruction and umbilical hernia. COMPARISON: CT 4/6/2022, radiograph 4/7/2022. TECHNIQUE: Dose reduction technique used: Automated exposure Control and/or adjustment of mA and kV according to patient size.  Multiple axial images were obtained through the abdomen and pelvis after intravenous injection of 125cc of isovue 370. No oral contrast given per request, limiting evaluation of GI tract and surrounding structures. Coronal reformatted images obtained for further evaluation of organs. FINDINGS: Partially included lung bases are unchanged. Partially visualized heart is enlarged, with scattered calcifications. Hiatal hernia and paraesophageal varices have not changed. Abdomen: Cirrhosis, splenomegaly, dilatation of portal vein and splenic veins, extensive upper abdominal varices, cholecystectomy are again evident. No acute changes in the liver or spleen. The adrenal glands and pancreas appear unremarkable. There is normal enhancement of the kidneys, no hydronephrosis. Large volume ascites has slightly increased. Diffuse subcutaneous and skin thickening or noted throughout the body wall. Patent major vessels. Aorta normal caliber. No free air. No interval lymphadenopathy. Drainage catheter inserted via right lower quadrant approach with tip in the left midabdomen. There is no evidence of bowel obstruction. GI evaluation is limited without oral contrast. Large volume stool compatible with constipation. No bowel hernia. Pelvis:  No acute inflammatory changes or fluid collections in the pelvis. No lymphadenopathy or mass. Uterus and ovaries are present. Urinary bladder is unremarkable as seen. Bones: No acute osseous lesions. 1. Previous ventral hernia and small bowel obstruction have resolved. 2. No evidence of abscess. 3. Ascites and anasarca. 4. Cirrhosis, portal hypertension. 5. Cholecystectomy. 6. Constipation.  GI details are limited without oral contrast.      Current Meds:  Current Facility-Administered Medications   Medication Dose Route Frequency    insulin lispro (HUMALOG) injection vial 20 Units  20 Units SubCUTAneous TID     vancomycin (VANCOCIN) 1500 mg in sodium chloride 0.9% 500 mL IVPB  1,500 mg IntraVENous Q24H    acetaminophen (TYLENOL) IntraMUSCular PRN    insulin lispro (HUMALOG) injection vial 0-16 Units  0-16 Units SubCUTAneous TID WC    insulin lispro (HUMALOG) injection vial 0-4 Units  0-4 Units SubCUTAneous Nightly       Signed:  Raymond John MD    Part of this note may have been written by using a voice dictation software. The note has been proof read but may still contain some grammatical/other typographical errors.

## 2022-05-25 NOTE — CARE COORDINATION
Taylor reviewed chart and spoke with pt and her niece. Pt is a 66 yr old female adm yesterday due to abd wall cellulitis. Pt lives with her sister Jean Quintanilla and her brother in law in [place]. Pt was living with another sister in [place]. Pt was not thriving as she was sleeping on the couch and has several falls. Pt moved here in Sept. She loves living with Jean Quintanilla as she manages all her affairs for her. She does the cooking, medication management, and anything else she needs. Pt had a Pleurex drain placed about 2 weeks ago but now has developed cellulitis. Pt is on 2 IV antibiotics. Pt was very talkative and pleasant and informed she loves the food here. Pt is current with [facility] Rn/PT and will need these services resumed at discharge.  Taylor will cont to follow and assist. Mariah Hernandez

## 2022-05-26 VITALS
HEART RATE: 71 BPM | OXYGEN SATURATION: 97 % | RESPIRATION RATE: 16 BRPM | SYSTOLIC BLOOD PRESSURE: 98 MMHG | TEMPERATURE: 97.5 F | DIASTOLIC BLOOD PRESSURE: 54 MMHG

## 2022-05-26 LAB
ALBUMIN SERPL-MCNC: 1.9 G/DL (ref 3.2–4.6)
ALBUMIN/GLOB SERPL: 0.4 {RATIO} (ref 1.2–3.5)
ALP SERPL-CCNC: 166 U/L (ref 50–136)
ALT SERPL-CCNC: 29 U/L (ref 12–65)
ANION GAP SERPL CALC-SCNC: 5 MMOL/L (ref 7–16)
APPEARANCE FLD: NORMAL
AST SERPL-CCNC: 35 U/L (ref 15–37)
BILIRUB SERPL-MCNC: 0.7 MG/DL (ref 0.2–1.1)
BUN SERPL-MCNC: 23 MG/DL (ref 8–23)
CALCIUM SERPL-MCNC: 9 MG/DL (ref 8.3–10.4)
CHLORIDE SERPL-SCNC: 100 MMOL/L (ref 98–107)
CO2 SERPL-SCNC: 32 MMOL/L (ref 21–32)
COLOR FLD: YELLOW
CREAT SERPL-MCNC: 0.92 MG/DL (ref 0.6–1)
ERYTHROCYTE [DISTWIDTH] IN BLOOD BY AUTOMATED COUNT: 14.6 % (ref 11.9–14.6)
GLOBULIN SER CALC-MCNC: 4.9 G/DL (ref 2.3–3.5)
GLUCOSE BLD STRIP.AUTO-MCNC: 107 MG/DL (ref 65–100)
GLUCOSE BLD STRIP.AUTO-MCNC: 129 MG/DL (ref 65–100)
GLUCOSE BLD STRIP.AUTO-MCNC: 155 MG/DL (ref 65–100)
GLUCOSE BLD STRIP.AUTO-MCNC: 183 MG/DL (ref 65–100)
GLUCOSE BLD STRIP.AUTO-MCNC: 94 MG/DL (ref 65–100)
GLUCOSE FLD-MCNC: 214 MG/DL
GLUCOSE SERPL-MCNC: 123 MG/DL (ref 65–100)
HCT VFR BLD AUTO: 33 % (ref 35.8–46.3)
HGB BLD-MCNC: 11 G/DL (ref 11.7–15.4)
LYMPHOCYTES NFR BRONCH MANUAL: 36 %
MACROPHAGES NFR BRONCH MANUAL: 52 %
MCH RBC QN AUTO: 27.8 PG (ref 26.1–32.9)
MCHC RBC AUTO-ENTMCNC: 33.3 G/DL (ref 31.4–35)
MCV RBC AUTO: 83.3 FL (ref 79.6–97.8)
NEUTROPHILS NFR BRONCH MANUAL: 12 %
NRBC # BLD: 0 K/UL (ref 0–0.2)
NUC CELL # FLD: 541 /CU MM
PLATELET # BLD AUTO: 179 K/UL (ref 150–450)
PMV BLD AUTO: 10.5 FL (ref 9.4–12.3)
POTASSIUM SERPL-SCNC: 3.7 MMOL/L (ref 3.5–5.1)
PROT SERPL-MCNC: 6.8 G/DL (ref 6.3–8.2)
RBC # BLD AUTO: 3.96 M/UL (ref 4.05–5.2)
RBC # FLD: 6000 /CU MM
SERVICE CMNT-IMP: ABNORMAL
SERVICE CMNT-IMP: NORMAL
SODIUM SERPL-SCNC: 137 MMOL/L (ref 136–145)
SPECIMEN SOURCE FLD: NORMAL
SPECIMEN SOURCE FLD: NORMAL
VANCOMYCIN SERPL-MCNC: 19.5 UG/ML
WBC # BLD AUTO: 4.5 K/UL (ref 4.3–11.1)

## 2022-05-26 PROCEDURE — 6370000000 HC RX 637 (ALT 250 FOR IP): Performed by: INTERNAL MEDICINE

## 2022-05-26 PROCEDURE — 2500000003 HC RX 250 WO HCPCS: Performed by: INTERNAL MEDICINE

## 2022-05-26 PROCEDURE — 36415 COLL VENOUS BLD VENIPUNCTURE: CPT

## 2022-05-26 PROCEDURE — 2580000003 HC RX 258: Performed by: FAMILY MEDICINE

## 2022-05-26 PROCEDURE — 85027 COMPLETE CBC AUTOMATED: CPT

## 2022-05-26 PROCEDURE — 6360000002 HC RX W HCPCS: Performed by: INTERNAL MEDICINE

## 2022-05-26 PROCEDURE — 3331090001 HH PPS REVENUE CREDIT

## 2022-05-26 PROCEDURE — 3331090002 HH PPS REVENUE DEBIT

## 2022-05-26 PROCEDURE — 1100000000 HC RM PRIVATE

## 2022-05-26 PROCEDURE — 82962 GLUCOSE BLOOD TEST: CPT

## 2022-05-26 PROCEDURE — 80053 COMPREHEN METABOLIC PANEL: CPT

## 2022-05-26 PROCEDURE — 6360000002 HC RX W HCPCS: Performed by: FAMILY MEDICINE

## 2022-05-26 PROCEDURE — 2580000003 HC RX 258: Performed by: INTERNAL MEDICINE

## 2022-05-26 PROCEDURE — P9047 ALBUMIN (HUMAN), 25%, 50ML: HCPCS | Performed by: INTERNAL MEDICINE

## 2022-05-26 PROCEDURE — 80202 ASSAY OF VANCOMYCIN: CPT

## 2022-05-26 PROCEDURE — 6370000000 HC RX 637 (ALT 250 FOR IP): Performed by: FAMILY MEDICINE

## 2022-05-26 RX ORDER — INSULIN LISPRO 100 [IU]/ML
12 INJECTION, SOLUTION INTRAVENOUS; SUBCUTANEOUS
Status: DISCONTINUED | OUTPATIENT
Start: 2022-05-26 | End: 2022-05-28 | Stop reason: HOSPADM

## 2022-05-26 RX ORDER — INSULIN GLARGINE 100 [IU]/ML
38 INJECTION, SOLUTION SUBCUTANEOUS 2 TIMES DAILY
Status: DISCONTINUED | OUTPATIENT
Start: 2022-05-26 | End: 2022-05-28 | Stop reason: HOSPADM

## 2022-05-26 RX ORDER — BUMETANIDE 0.25 MG/ML
2 INJECTION, SOLUTION INTRAMUSCULAR; INTRAVENOUS 2 TIMES DAILY
Status: DISCONTINUED | OUTPATIENT
Start: 2022-05-26 | End: 2022-05-27

## 2022-05-26 RX ORDER — ALBUMIN (HUMAN) 12.5 G/50ML
25 SOLUTION INTRAVENOUS EVERY 6 HOURS
Status: COMPLETED | OUTPATIENT
Start: 2022-05-26 | End: 2022-05-27

## 2022-05-26 RX ORDER — DIPHENHYDRAMINE HCL 25 MG
12.5 TABLET ORAL EVERY 6 HOURS PRN
Status: DISCONTINUED | OUTPATIENT
Start: 2022-05-26 | End: 2022-05-26 | Stop reason: SDUPTHER

## 2022-05-26 RX ORDER — DIPHENHYDRAMINE HCL 25 MG
12.5 TABLET ORAL EVERY 6 HOURS PRN
Status: DISCONTINUED | OUTPATIENT
Start: 2022-05-26 | End: 2022-05-26

## 2022-05-26 RX ADMIN — PANTOPRAZOLE SODIUM 40 MG: 40 TABLET, DELAYED RELEASE ORAL at 09:36

## 2022-05-26 RX ADMIN — MIDODRINE HYDROCHLORIDE 5 MG: 5 TABLET ORAL at 17:31

## 2022-05-26 RX ADMIN — MIDODRINE HYDROCHLORIDE 5 MG: 5 TABLET ORAL at 11:27

## 2022-05-26 RX ADMIN — SPIRONOLACTONE 100 MG: 25 TABLET ORAL at 09:36

## 2022-05-26 RX ADMIN — PIPERACILLIN AND TAZOBACTAM 3375 MG: 3; .375 INJECTION, POWDER, LYOPHILIZED, FOR SOLUTION INTRAVENOUS at 09:39

## 2022-05-26 RX ADMIN — ENOXAPARIN SODIUM 40 MG: 100 INJECTION SUBCUTANEOUS at 09:40

## 2022-05-26 RX ADMIN — BUMETANIDE 2 MG: 0.25 INJECTION, SOLUTION INTRAMUSCULAR; INTRAVENOUS at 20:50

## 2022-05-26 RX ADMIN — TROSPIUM CHLORIDE 20 MG: 20 TABLET, FILM COATED ORAL at 09:37

## 2022-05-26 RX ADMIN — VANCOMYCIN HYDROCHLORIDE 1250 MG: 10 INJECTION, POWDER, LYOPHILIZED, FOR SOLUTION INTRAVENOUS at 17:31

## 2022-05-26 RX ADMIN — ALBUMIN (HUMAN) 25 G: 0.25 INJECTION, SOLUTION INTRAVENOUS at 20:50

## 2022-05-26 RX ADMIN — MIDODRINE HYDROCHLORIDE 5 MG: 5 TABLET ORAL at 09:37

## 2022-05-26 RX ADMIN — FERROUS SULFATE TAB 325 MG (65 MG ELEMENTAL FE) 325 MG: 325 (65 FE) TAB at 05:41

## 2022-05-26 RX ADMIN — RIFAXIMIN 550 MG: 550 TABLET ORAL at 20:51

## 2022-05-26 RX ADMIN — INSULIN GLARGINE 38 UNITS: 100 INJECTION, SOLUTION SUBCUTANEOUS at 21:04

## 2022-05-26 RX ADMIN — PANTOPRAZOLE SODIUM 40 MG: 40 TABLET, DELAYED RELEASE ORAL at 20:51

## 2022-05-26 RX ADMIN — TROSPIUM CHLORIDE 20 MG: 20 TABLET, FILM COATED ORAL at 20:51

## 2022-05-26 RX ADMIN — PRAVASTATIN SODIUM 40 MG: 20 TABLET ORAL at 20:51

## 2022-05-26 RX ADMIN — Medication 50 MG: at 09:38

## 2022-05-26 RX ADMIN — Medication 400 MG: at 20:51

## 2022-05-26 RX ADMIN — ESCITALOPRAM OXALATE 5 MG: 10 TABLET ORAL at 09:37

## 2022-05-26 RX ADMIN — Medication 400 MG: at 09:37

## 2022-05-26 RX ADMIN — ASPIRIN 81 MG 81 MG: 81 TABLET ORAL at 09:36

## 2022-05-26 RX ADMIN — PROPRANOLOL HYDROCHLORIDE 20 MG: 40 TABLET ORAL at 09:38

## 2022-05-26 RX ADMIN — INSULIN LISPRO 15 UNITS: 100 INJECTION, SOLUTION INTRAVENOUS; SUBCUTANEOUS at 09:49

## 2022-05-26 RX ADMIN — DIPHENHYDRAMINE HYDROCHLORIDE 12.5 MG: 12.5 LIQUID ORAL at 20:51

## 2022-05-26 RX ADMIN — INSULIN GLARGINE 42 UNITS: 100 INJECTION, SOLUTION SUBCUTANEOUS at 09:56

## 2022-05-26 RX ADMIN — SODIUM CHLORIDE, PRESERVATIVE FREE 10 ML: 5 INJECTION INTRAVENOUS at 09:51

## 2022-05-26 RX ADMIN — BUMETANIDE 1 MG: 1 TABLET ORAL at 09:38

## 2022-05-26 RX ADMIN — RIFAXIMIN 550 MG: 550 TABLET ORAL at 09:38

## 2022-05-26 RX ADMIN — ALBUMIN (HUMAN) 25 G: 0.25 INJECTION, SOLUTION INTRAVENOUS at 14:15

## 2022-05-26 RX ADMIN — PIPERACILLIN AND TAZOBACTAM 3375 MG: 3; .375 INJECTION, POWDER, LYOPHILIZED, FOR SOLUTION INTRAVENOUS at 17:31

## 2022-05-26 ASSESSMENT — PAIN SCALES - GENERAL
PAINLEVEL_OUTOF10: 0

## 2022-05-26 NOTE — PROGRESS NOTES
VANCO DAILY FOLLOW UP NOTE  [address] [facility] Pharmacokinetic Monitoring Service - Vancomycin    Consulting Provider: Dr Carlos Manuel Monge   Indication: Cellulitis/abdominal SSTI  Target Concentration: Goal trough of 10-15 mg/L and AUC/YANELIS <500 mg*hr/L  Day of Therapy: 2  Additional Antimicrobials: Zosyn    Pertinent Laboratory Values: Wt Readings from Last 1 Encounters:   05/24/22 173 lb 1 oz (78.5 kg)     Temp Readings from Last 1 Encounters:   05/26/22 97.9 °F (36.6 °C) (Oral)     Recent Labs     05/24/22  1819 05/25/22  0401 05/26/22  0502   BUN 26* 23 23   CREATININE 0.82 1.02* 0.92   WBC 5.1 4.9 4.5     Estimated Creatinine Clearance: 54 mL/min (based on SCr of 0.92 mg/dL). Lab Results   Component Value Date    VANCORANDOM 19.5 05/26/2022       MRSA Nasal Swab: N/A. Non-respiratory infection. .      Assessment:  Date/Time Dose Concentration AUC   5/26/22  0502 1500 mg q24h 19.5 579   Note: Serum concentrations collected for AUC dosing may appear elevated if collected in close proximity to the dose administered, this is not necessarily an indication of toxicity    Plan:  Current dosing regimen is supra-therapeutic  Decrease dose to 1250 mg q24h for predicted AUC/Tr of 488/14.0  Repeat vancomycin concentrations will be ordered as clinically appropriate   Pharmacy will continue to monitor patient and adjust therapy as indicated    Thank you for the consult,  Mansi Mcpherson, [facility]

## 2022-05-27 ENCOUNTER — HOME HEALTH ADMISSION (OUTPATIENT)
Dept: HOME HEALTH SERVICES | Facility: HOME HEALTH | Age: 72
End: 2022-05-27

## 2022-05-27 LAB
ALBUMIN SERPL-MCNC: 2.8 G/DL (ref 3.2–4.6)
ALBUMIN/GLOB SERPL: 0.8 {RATIO} (ref 1.2–3.5)
ALP SERPL-CCNC: 156 U/L (ref 50–130)
ALT SERPL-CCNC: 23 U/L (ref 12–65)
ANION GAP SERPL CALC-SCNC: 7 MMOL/L (ref 7–16)
AST SERPL-CCNC: 29 U/L (ref 15–37)
BILIRUB DIRECT SERPL-MCNC: 0.4 MG/DL
BILIRUB SERPL-MCNC: 0.7 MG/DL (ref 0.2–1.1)
BUN SERPL-MCNC: 21 MG/DL (ref 8–23)
CALCIUM SERPL-MCNC: 8.9 MG/DL (ref 8.3–10.4)
CHLORIDE SERPL-SCNC: 100 MMOL/L (ref 98–107)
CO2 SERPL-SCNC: 31 MMOL/L (ref 21–32)
CREAT SERPL-MCNC: 1.08 MG/DL (ref 0.6–1)
ERYTHROCYTE [DISTWIDTH] IN BLOOD BY AUTOMATED COUNT: 15 % (ref 11.9–14.6)
GLOBULIN SER CALC-MCNC: 3.6 G/DL (ref 2.3–3.5)
GLUCOSE BLD STRIP.AUTO-MCNC: 165 MG/DL (ref 65–100)
GLUCOSE BLD STRIP.AUTO-MCNC: 198 MG/DL (ref 65–100)
GLUCOSE BLD STRIP.AUTO-MCNC: 213 MG/DL (ref 65–100)
GLUCOSE BLD STRIP.AUTO-MCNC: 239 MG/DL (ref 65–100)
GLUCOSE SERPL-MCNC: 185 MG/DL (ref 65–100)
HCT VFR BLD AUTO: 30 % (ref 35.8–46.3)
HGB BLD-MCNC: 9.9 G/DL (ref 11.7–15.4)
MCH RBC QN AUTO: 28 PG (ref 26.1–32.9)
MCHC RBC AUTO-ENTMCNC: 33 G/DL (ref 31.4–35)
MCV RBC AUTO: 84.7 FL (ref 79.6–97.8)
NRBC # BLD: 0 K/UL (ref 0–0.2)
PLATELET # BLD AUTO: 160 K/UL (ref 150–450)
PMV BLD AUTO: 10.4 FL (ref 9.4–12.3)
POTASSIUM SERPL-SCNC: 3.8 MMOL/L (ref 3.5–5.1)
PROT SERPL-MCNC: 6.4 G/DL (ref 6.3–8.2)
RBC # BLD AUTO: 3.54 M/UL (ref 4.05–5.2)
SERVICE CMNT-IMP: ABNORMAL
SODIUM SERPL-SCNC: 138 MMOL/L (ref 136–145)
WBC # BLD AUTO: 3.7 K/UL (ref 4.3–11.1)

## 2022-05-27 PROCEDURE — P9047 ALBUMIN (HUMAN), 25%, 50ML: HCPCS | Performed by: INTERNAL MEDICINE

## 2022-05-27 PROCEDURE — 6360000002 HC RX W HCPCS: Performed by: FAMILY MEDICINE

## 2022-05-27 PROCEDURE — 1100000000 HC RM PRIVATE

## 2022-05-27 PROCEDURE — 2500000003 HC RX 250 WO HCPCS: Performed by: INTERNAL MEDICINE

## 2022-05-27 PROCEDURE — 80053 COMPREHEN METABOLIC PANEL: CPT

## 2022-05-27 PROCEDURE — 2580000003 HC RX 258: Performed by: INTERNAL MEDICINE

## 2022-05-27 PROCEDURE — 2580000003 HC RX 258: Performed by: FAMILY MEDICINE

## 2022-05-27 PROCEDURE — 3331090001 HH PPS REVENUE CREDIT

## 2022-05-27 PROCEDURE — 82962 GLUCOSE BLOOD TEST: CPT

## 2022-05-27 PROCEDURE — 6370000000 HC RX 637 (ALT 250 FOR IP): Performed by: FAMILY MEDICINE

## 2022-05-27 PROCEDURE — 3331090002 HH PPS REVENUE DEBIT

## 2022-05-27 PROCEDURE — 6360000002 HC RX W HCPCS: Performed by: INTERNAL MEDICINE

## 2022-05-27 PROCEDURE — 36415 COLL VENOUS BLD VENIPUNCTURE: CPT

## 2022-05-27 PROCEDURE — 85027 COMPLETE CBC AUTOMATED: CPT

## 2022-05-27 PROCEDURE — 6370000000 HC RX 637 (ALT 250 FOR IP): Performed by: INTERNAL MEDICINE

## 2022-05-27 PROCEDURE — 82248 BILIRUBIN DIRECT: CPT

## 2022-05-27 RX ORDER — ALBUMIN (HUMAN) 12.5 G/50ML
25 SOLUTION INTRAVENOUS EVERY 6 HOURS
Status: COMPLETED | OUTPATIENT
Start: 2022-05-27 | End: 2022-05-28

## 2022-05-27 RX ADMIN — PRAVASTATIN SODIUM 40 MG: 20 TABLET ORAL at 21:17

## 2022-05-27 RX ADMIN — ESCITALOPRAM OXALATE 5 MG: 10 TABLET ORAL at 09:01

## 2022-05-27 RX ADMIN — INSULIN LISPRO 12 UNITS: 100 INJECTION, SOLUTION INTRAVENOUS; SUBCUTANEOUS at 17:19

## 2022-05-27 RX ADMIN — PANTOPRAZOLE SODIUM 40 MG: 40 TABLET, DELAYED RELEASE ORAL at 09:01

## 2022-05-27 RX ADMIN — INSULIN LISPRO 4 UNITS: 100 INJECTION, SOLUTION INTRAVENOUS; SUBCUTANEOUS at 21:23

## 2022-05-27 RX ADMIN — RIFAXIMIN 550 MG: 550 TABLET ORAL at 09:01

## 2022-05-27 RX ADMIN — ASPIRIN 81 MG 81 MG: 81 TABLET ORAL at 09:00

## 2022-05-27 RX ADMIN — PANTOPRAZOLE SODIUM 40 MG: 40 TABLET, DELAYED RELEASE ORAL at 21:17

## 2022-05-27 RX ADMIN — INSULIN LISPRO 12 UNITS: 100 INJECTION, SOLUTION INTRAVENOUS; SUBCUTANEOUS at 09:03

## 2022-05-27 RX ADMIN — PIPERACILLIN AND TAZOBACTAM 3375 MG: 3; .375 INJECTION, POWDER, LYOPHILIZED, FOR SOLUTION INTRAVENOUS at 17:19

## 2022-05-27 RX ADMIN — MIDODRINE HYDROCHLORIDE 5 MG: 5 TABLET ORAL at 17:21

## 2022-05-27 RX ADMIN — INSULIN LISPRO 12 UNITS: 100 INJECTION, SOLUTION INTRAVENOUS; SUBCUTANEOUS at 12:16

## 2022-05-27 RX ADMIN — BUMETANIDE 2 MG: 0.25 INJECTION, SOLUTION INTRAMUSCULAR; INTRAVENOUS at 09:02

## 2022-05-27 RX ADMIN — PROPRANOLOL HYDROCHLORIDE 20 MG: 40 TABLET ORAL at 21:15

## 2022-05-27 RX ADMIN — INSULIN GLARGINE 38 UNITS: 100 INJECTION, SOLUTION SUBCUTANEOUS at 21:23

## 2022-05-27 RX ADMIN — SODIUM CHLORIDE, PRESERVATIVE FREE 10 ML: 5 INJECTION INTRAVENOUS at 09:08

## 2022-05-27 RX ADMIN — TROSPIUM CHLORIDE 20 MG: 20 TABLET, FILM COATED ORAL at 21:17

## 2022-05-27 RX ADMIN — VANCOMYCIN HYDROCHLORIDE 1000 MG: 1 INJECTION, POWDER, LYOPHILIZED, FOR SOLUTION INTRAVENOUS at 17:20

## 2022-05-27 RX ADMIN — RIFAXIMIN 550 MG: 550 TABLET ORAL at 21:17

## 2022-05-27 RX ADMIN — FERROUS SULFATE TAB 325 MG (65 MG ELEMENTAL FE) 325 MG: 325 (65 FE) TAB at 05:34

## 2022-05-27 RX ADMIN — ENOXAPARIN SODIUM 40 MG: 100 INJECTION SUBCUTANEOUS at 09:02

## 2022-05-27 RX ADMIN — Medication 400 MG: at 09:01

## 2022-05-27 RX ADMIN — ACETAMINOPHEN 1000 MG: 500 TABLET, FILM COATED ORAL at 17:20

## 2022-05-27 RX ADMIN — BUMETANIDE 1 MG: 1 TABLET ORAL at 21:19

## 2022-05-27 RX ADMIN — BUMETANIDE 1 MG: 1 TABLET ORAL at 14:07

## 2022-05-27 RX ADMIN — ALBUMIN (HUMAN) 25 G: 0.25 INJECTION, SOLUTION INTRAVENOUS at 17:21

## 2022-05-27 RX ADMIN — SODIUM CHLORIDE, PRESERVATIVE FREE 10 ML: 5 INJECTION INTRAVENOUS at 21:17

## 2022-05-27 RX ADMIN — PIPERACILLIN AND TAZOBACTAM 3375 MG: 3; .375 INJECTION, POWDER, LYOPHILIZED, FOR SOLUTION INTRAVENOUS at 01:49

## 2022-05-27 RX ADMIN — PROPRANOLOL HYDROCHLORIDE 20 MG: 40 TABLET ORAL at 09:00

## 2022-05-27 RX ADMIN — TROSPIUM CHLORIDE 20 MG: 20 TABLET, FILM COATED ORAL at 09:01

## 2022-05-27 RX ADMIN — INSULIN GLARGINE 38 UNITS: 100 INJECTION, SOLUTION SUBCUTANEOUS at 09:02

## 2022-05-27 RX ADMIN — INSULIN LISPRO 4 UNITS: 100 INJECTION, SOLUTION INTRAVENOUS; SUBCUTANEOUS at 12:15

## 2022-05-27 RX ADMIN — PIPERACILLIN AND TAZOBACTAM 3375 MG: 3; .375 INJECTION, POWDER, LYOPHILIZED, FOR SOLUTION INTRAVENOUS at 09:06

## 2022-05-27 RX ADMIN — Medication 50 MG: at 09:01

## 2022-05-27 RX ADMIN — ALBUMIN (HUMAN) 25 G: 0.25 INJECTION, SOLUTION INTRAVENOUS at 01:49

## 2022-05-27 RX ADMIN — MIDODRINE HYDROCHLORIDE 5 MG: 5 TABLET ORAL at 09:01

## 2022-05-27 RX ADMIN — Medication 400 MG: at 21:17

## 2022-05-27 RX ADMIN — ALBUMIN (HUMAN) 25 G: 0.25 INJECTION, SOLUTION INTRAVENOUS at 14:07

## 2022-05-27 RX ADMIN — MIDODRINE HYDROCHLORIDE 5 MG: 5 TABLET ORAL at 12:11

## 2022-05-27 RX ADMIN — SPIRONOLACTONE 100 MG: 25 TABLET ORAL at 09:00

## 2022-05-27 ASSESSMENT — PAIN SCALES - GENERAL
PAINLEVEL_OUTOF10: 0
PAINLEVEL_OUTOF10: 4

## 2022-05-27 ASSESSMENT — PAIN DESCRIPTION - DESCRIPTORS: DESCRIPTORS: PRESSURE

## 2022-05-27 ASSESSMENT — PAIN DESCRIPTION - LOCATION: LOCATION: HEAD

## 2022-05-27 ASSESSMENT — PAIN DESCRIPTION - ORIENTATION: ORIENTATION: MID

## 2022-05-27 NOTE — PROGRESS NOTES
VANCO DAILY FOLLOW UP NOTE  [address] [facility] Pharmacokinetic Monitoring Service - Vancomycin    Consulting Provider: Dr. Jerry Chery   Indication: Cellulitis/abdominal SSTI  Target Concentration: Goal trough of 10-15 mg/L and AUC/YANELIS <500 mg*hr/L  Day of Therapy: 3  Additional Antimicrobials: Zosyn    Pertinent Laboratory Values: Wt Readings from Last 1 Encounters:   05/24/22 173 lb 1 oz (78.5 kg)     Temp Readings from Last 1 Encounters:   05/27/22 97.8 °F (36.6 °C)     Recent Labs     05/25/22  0401 05/26/22  0502 05/27/22  0414   BUN 23 23 21   CREATININE 1.02* 0.92 1.08*   WBC 4.9 4.5 3.7*     Estimated Creatinine Clearance: 46 mL/min (A) (based on SCr of 1.08 mg/dL (H)). Lab Results   Component Value Date    VANCORANDOM 19.5 05/26/2022       MRSA Nasal Swab: N/A. Non-respiratory infection. .    Plan:  Current dosing regimen is supra-therapeutic  Decrease dose to 1000 mg q24h for predicted AUC/Tr of 429/12.8  Repeat vancomycin concentration ordered for 5/28 @ 0400    Pharmacy will continue to monitor patient and adjust therapy as indicated    Thank you for the consult,  Joel Pereira, [facility]

## 2022-05-27 NOTE — PROGRESS NOTES
Hospitalist Progress Note   Admit Date:  2022  5:38 PM   Name:  Link Brunner   Age:  66 y.o. Sex:  female  :  1950   MRN:  [id number]   Room:  [facility]/    Presenting Complaint: Skin Problem and Abdominal Pain     Reason(s) for Admission: Cellulitis of abdominal wall [L03.311]  Abdominal wall cellulitis [T09.393]     Hospital Course & Interval History:   Please refer to the admission H&P for details of presentation. In summary, Link Brunner is a 66 y.o. 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 Patient had 800cc of fluids removed from her abdomen via pluerex on . Removed 550cc of asitic fluid via pleurex drain on . Ascitic fluid culture is neg and blood culture has been neg so far. Subjective/24 hr Events (22) : Patient is seen and examined at bedside. No acute events reported overnight by nursing staff. Reports feeling better and slept well. Sister at bedside. No abdominal pain or distension. Patient denies fever, chills, chest pains, shortness of breath, n/v, abdominal pain. Review of Systems: 10 point review of systems is otherwise negative with the exception of the elements mentioned above. Assessment & Plan:   Abdominal wall cellulitis  In setting of frequent ascitic fluid drainage via pleur-ex drain. Removed ~550 cc of ascitic fluids via pleurx drain  at bedside. Ascitic fluid sample obtained  showed WBC of 541, ascitic glucose of 214, LDH of 214.  22: Ascitic fluid culture and BCx culture neg so far. - broad spectrum abx with vancomycin and zosyn for now with plan to send to PO abx    Dyslipidemia: on prvastatin    Type II diabetes mellitus ([facility] [address]  22: FS high this AM.  Sister states she isnt used to low FS and start showing symptoms of hypoglycemia easily.   - decrease lantus to 38U BID  - decrease prandial to 12U [facility]  - ISS    Liver cirrhosis secondary to LOWERY with Ascites  Esophageal varices   Ascitic fluid analysis done 5/25 which showed SAAG 1.3 which signifies portal hypertension as cause of ascites. 05/27/22:   1350L urine output yesterday after bumex 2mg IV. Will change bumex back to oral as she had a slight bump in Cr. Will give another day of albumin 25g q6h today. - on spironolactone, lactulose, propanolol, rifaximin  - add benadryl PO PRN for pruritis   - remove another 550cc of ascitic fluid today. Diet:  ADULT DIET; Regular; 3 carb choices (45 gm/meal)  DVT PPx: lovenox  Code status: Full Code      I have reviewed and ordered clinical lab tests and independently visualized images, tracing. I spent 35 minutes of time caring for this patient at bedside or nearby, more than 50 percent of which was spent on coordination of care and/or counseling regarding the disease process, treatment options, and treatment plan. Discussed with patient's sister at bedside.       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) ([facility] [address] 5/24/2022 Yes    CAD (coronary artery disease) 5/24/2022 Yes            Objective:     Patient Vitals for the past 24 hrs:   Temp Pulse Resp BP SpO2   05/27/22 1107 97.8 °F (36.6 °C) 79 20 120/65 --   05/27/22 0710 98.2 °F (36.8 °C) 77 20 114/66 97 %   05/27/22 0345 98.2 °F (36.8 °C) 75 16 (!) 121/57 95 %   05/26/22 2306 98.6 °F (37 °C) 79 16 124/65 98 %   05/26/22 1838 98.5 °F (36.9 °C) 97 18 (!) 99/59 95 % 05/26/22 1500 98.4 °F (36.9 °C) 77 16 111/65 97 %       Estimated body mass index is 30.66 kg/m² as calculated from the following:    Height as of this encounter: 5' 3\" (1.6 m). Weight as of this encounter: 173 lb 1 oz (78.5 kg). Intake/Output Summary (Last 24 hours) at 5/27/2022 1241  Last data filed at 5/27/2022 0359  Gross per 24 hour   Intake --   Output 1350 ml   Net -1350 ml         Physical Exam:     Blood pressure 120/65, pulse 79, temperature 97.8 °F (36.6 °C), resp. rate 20, height 5' 3\" (1.6 m), weight 173 lb 1 oz (78.5 kg), SpO2 97 %. General:    Chronically ill appearing. No overt distress  Head:  Normocephalic, atraumatic  Eyes:  Sclerae appear normal.  Pupils equally round. ENT:  Nares appear normal, no drainage. Moist oral mucosa  Neck:  No restricted ROM. Trachea midline   CV:   RRR. No m/r/g. No jugular venous distension. Lungs:   CTAB. No wheezing. Respirations even, unlabored  Abdomen: Bowel sounds present. Non tender, distended and slightly firm. +pleurex drain Warmth/erythema consistent with cellulitis seen on abdominal wall. Extremities: No cyanosis or clubbing. No edema  Skin:     No rashes and normal coloration. Warm and dry. Neuro:  CN II-XII grossly intact. Sensation intact. A&Ox3  Psych:  Normal mood and affect.       I have reviewed ordered lab tests and independently visualized imaging below:    Recent Labs:  Recent Results (from the past 48 hour(s))   Body fluid cell count    Collection Time: 05/25/22  4:55 PM   Result Value Ref Range    Specimen ASCITIC FLUID       Color, Fluid YELLOW      Appearance, Fluid CLOUDY      RBC, Fluid 6,000 /cu mm    WBC, Fluid 541 /cu mm    Segmented Neutrophils, BAL 12 %    Lymphocytes, BAL 36 %    Macrophages, BAL 52 %   Protein, Body Fluid    Collection Time: 05/25/22  4:55 PM   Result Value Ref Range    Fluid Type ASCITIC FLUID       Total Protein, Body Fluid 1.7 g/dL   Albumin, Body Fluid    Collection Time: 05/25/22 4:55 PM   Result Value Ref Range    Fluid Type ASCITIC FLUID       Albumin, Fluid 0.6 g/dL   Culture, Body Fluid    Collection Time: 05/25/22  4:55 PM    Specimen: Ascitic Fluid;  Body Fluid   Result Value Ref Range    Special Requests NO SPECIAL REQUESTS      Gram stain NO WBC'S SEEN      Gram stain NO DEFINITE ORGANISM SEEN      Culture NO GROWTH 1 DAY     Glucose, Body Fluid    Collection Time: 05/25/22  4:55 PM   Result Value Ref Range    Fluid Type ASCITIC FLUID       Glucose, Body Fluid 214 MG/DL   Lactate Dehydrogenase, Body Fluid    Collection Time: 05/25/22  4:55 PM   Result Value Ref Range    Fluid Type ASCITIC FLUID       LD, Fluid 75 U/L   POCT Glucose    Collection Time: 05/25/22  5:04 PM   Result Value Ref Range    POC Glucose 130 (H) 65 - 100 mg/dL    Performed by: Mariano Walton    POCT Glucose    Collection Time: 05/25/22  8:37 PM   Result Value Ref Range    POC Glucose 93 65 - 100 mg/dL    Performed by: Deann    Vancomycin Level, Random    Collection Time: 05/26/22  5:02 AM   Result Value Ref Range    Vancomycin Rm 19.5 UG/ML   CBC    Collection Time: 05/26/22  5:02 AM   Result Value Ref Range    WBC 4.5 4.3 - 11.1 K/uL    RBC 3.96 (L) 4.05 - 5.2 M/uL    Hemoglobin 11.0 (L) 11.7 - 15.4 g/dL    Hematocrit 33.0 (L) 35.8 - 46.3 %    MCV 83.3 79.6 - 97.8 FL    MCH 27.8 26.1 - 32.9 PG    MCHC 33.3 31.4 - 35.0 g/dL    RDW 14.6 11.9 - 14.6 %    Platelets 154 253 - 648 K/uL    MPV 10.5 9.4 - 12.3 FL    nRBC 0.00 0.0 - 0.2 K/uL   Comprehensive Metabolic Panel    Collection Time: 05/26/22  5:02 AM   Result Value Ref Range    Sodium 137 136 - 145 mmol/L    Potassium 3.7 3.5 - 5.1 mmol/L    Chloride 100 98 - 107 mmol/L    CO2 32 21 - 32 mmol/L    Anion Gap 5 (L) 7 - 16 mmol/L    Glucose 123 (H) 65 - 100 mg/dL    BUN 23 8 - 23 MG/DL    CREATININE 0.92 0.6 - 1.0 MG/DL    GFR African American >60 >60 ml/min/1.73m2    GFR Non- >60 >60 ml/min/1.73m2    Calcium 9.0 8.3 - 10.4 MG/DL Total Bilirubin 0.7 0.2 - 1.1 MG/DL    ALT 29 12 - 65 U/L    AST 35 15 - 37 U/L    Alk Phosphatase 166 (H) 50 - 136 U/L    Total Protein 6.8 6.3 - 8.2 g/dL    Albumin 1.9 (L) 3.2 - 4.6 g/dL    Globulin 4.9 (H) 2.3 - 3.5 g/dL    Albumin/Globulin Ratio 0.4 (L) 1.2 - 3.5     POCT Glucose    Collection Time: 05/26/22  5:58 AM   Result Value Ref Range    POC Glucose 129 (H) 65 - 100 mg/dL    Performed by: Deann    POCT Glucose    Collection Time: 05/26/22 11:08 AM   Result Value Ref Range    POC Glucose 183 (H) 65 - 100 mg/dL    Performed by: Chilo    POCT Glucose    Collection Time: 05/26/22  1:51 PM   Result Value Ref Range    POC Glucose 94 65 - 100 mg/dL    Performed by: Fidencio    POCT Glucose    Collection Time: 05/26/22  4:51 PM   Result Value Ref Range    POC Glucose 107 (H) 65 - 100 mg/dL    Performed by: Nell Sagastume    POCT Glucose    Collection Time: 05/26/22  8:59 PM   Result Value Ref Range    POC Glucose 155 (H) 65 - 100 mg/dL    Performed by: Tish    CBC    Collection Time: 05/27/22  4:14 AM   Result Value Ref Range    WBC 3.7 (L) 4.3 - 11.1 K/uL    RBC 3.54 (L) 4.05 - 5.2 M/uL    Hemoglobin 9.9 (L) 11.7 - 15.4 g/dL    Hematocrit 30.0 (L) 35.8 - 46.3 %    MCV 84.7 79.6 - 97.8 FL    MCH 28.0 26.1 - 32.9 PG    MCHC 33.0 31.4 - 35.0 g/dL    RDW 15.0 (H) 11.9 - 14.6 %    Platelets 456 068 - 388 K/uL    MPV 10.4 9.4 - 12.3 FL    nRBC 0.00 0.0 - 0.2 K/uL   Comprehensive Metabolic Panel    Collection Time: 05/27/22  4:14 AM   Result Value Ref Range    Sodium 138 136 - 145 mmol/L    Potassium 3.8 3.5 - 5.1 mmol/L    Chloride 100 98 - 107 mmol/L    CO2 31 21 - 32 mmol/L    Anion Gap 7 7 - 16 mmol/L    Glucose 185 (H) 65 - 100 mg/dL    BUN 21 8 - 23 MG/DL    CREATININE 1.08 (H) 0.6 - 1.0 MG/DL    GFR African American >60 >60 ml/min/1.73m2    GFR Non- 53 (L) >60 ml/min/1.73m2    Calcium 8.9 8.3 - 10.4 MG/DL    Total Bilirubin 0.7 0.2 - 1.1 MG/DL    ALT 23 12 - 65 U/L    AST 29 15 - 37 U/L    Alk Phosphatase 156 (H) 50 - 130 U/L    Total Protein 6.4 6.3 - 8.2 g/dL    Albumin 2.8 (L) 3.2 - 4.6 g/dL    Globulin 3.6 (H) 2.3 - 3.5 g/dL    Albumin/Globulin Ratio 0.8 (L) 1.2 - 3.5     Bilirubin, Direct    Collection Time: 05/27/22  4:14 AM   Result Value Ref Range    Bilirubin, Direct 0.4 (H) <0.4 MG/DL   POCT Glucose    Collection Time: 05/27/22  6:02 AM   Result Value Ref Range    POC Glucose 198 (H) 65 - 100 mg/dL    Performed by: Tish    POCT Glucose    Collection Time: 05/27/22 11:33 AM   Result Value Ref Range    POC Glucose 239 (H) 65 - 100 mg/dL    Performed by: Antonino Barnhart          Other Studies:  CT ABDOMEN PELVIS W IV CONTRAST Additional Contrast? None    Result Date: 5/24/2022  CT of the Abdomen and Pelvis with contrast CLINICAL INDICATION:  Subacute worsening severe pain and swelling throughout the abdominal wall with cellulitis, evaluate for abscess. Generalized abdominal distention. Cirrhosis, ascites, 6 4 hypertension, biliary dilatation. Follow-up small bowel obstruction and umbilical hernia. COMPARISON: CT 4/6/2022, radiograph 4/7/2022. TECHNIQUE: Dose reduction technique used: Automated exposure Control and/or adjustment of mA and kV according to patient size. Multiple axial images were obtained through the abdomen and pelvis after intravenous injection of 125cc of isovue 370. No oral contrast given per request, limiting evaluation of GI tract and surrounding structures. Coronal reformatted images obtained for further evaluation of organs. FINDINGS: Partially included lung bases are unchanged. Partially visualized heart is enlarged, with scattered calcifications. Hiatal hernia and paraesophageal varices have not changed. Abdomen: Cirrhosis, splenomegaly, dilatation of portal vein and splenic veins, extensive upper abdominal varices, cholecystectomy are again evident. No acute changes in the liver or spleen.   The adrenal glands and pancreas appear unremarkable. There is normal enhancement of the kidneys, no hydronephrosis. Large volume ascites has slightly increased. Diffuse subcutaneous and skin thickening or noted throughout the body wall. Patent major vessels. Aorta normal caliber. No free air. No interval lymphadenopathy. Drainage catheter inserted via right lower quadrant approach with tip in the left midabdomen. There is no evidence of bowel obstruction. GI evaluation is limited without oral contrast. Large volume stool compatible with constipation. No bowel hernia. Pelvis:  No acute inflammatory changes or fluid collections in the pelvis. No lymphadenopathy or mass. Uterus and ovaries are present. Urinary bladder is unremarkable as seen. Bones: No acute osseous lesions. 1. Previous ventral hernia and small bowel obstruction have resolved. 2. No evidence of abscess. 3. Ascites and anasarca. 4. Cirrhosis, portal hypertension. 5. Cholecystectomy. 6. Constipation.  GI details are limited without oral contrast.      Current Meds:  Current Facility-Administered Medications   Medication Dose Route Frequency    albumin human 25 % IV solution 25 g  25 g IntraVENous Q6H    vancomycin (VANCOCIN) 1250 mg in sodium chloride 0.9% 250 mL IVPB  1,250 mg IntraVENous Q24H    insulin lispro (HUMALOG) injection vial 12 Units  12 Units SubCUTAneous TID WC    insulin glargine (LANTUS) injection vial 38 Units  38 Units SubCUTAneous BID    diphenhydrAMINE (BENYLIN) 12.5 MG/5ML liquid 12.5 mg  12.5 mg Oral Q6H PRN    acetaminophen (TYLENOL) tablet 1,000 mg  1,000 mg Oral Q6H PRN    aspirin chewable tablet 81 mg  81 mg Oral Daily    bumetanide (BUMEX) tablet 1 mg  1 mg Oral TID    escitalopram (LEXAPRO) tablet 5 mg  5 mg Oral Daily    ferrous sulfate (IRON 325) tablet 325 mg  325 mg Oral QAM AC    lactulose (CHRONULAC) 10 GM/15ML solution 20 g  30 mL Oral TID    magnesium oxide (MAG-OX) tablet 400 mg  400 mg Oral BID    midodrine (PROAMATINE) tablet 5 mg  5 mg Oral TID WC    trospium (SANCTURA) tablet 20 mg  20 mg Oral BID    pantoprazole (PROTONIX) tablet 40 mg  40 mg Oral BID    pravastatin (PRAVACHOL) tablet 40 mg  40 mg Oral Nightly    propranolol (INDERAL) tablet 20 mg  20 mg Oral BID    rifAXIMin (XIFAXAN) tablet 550 mg  550 mg Oral BID    spironolactone (ALDACTONE) tablet 100 mg  100 mg Oral Daily    zinc sulfate (ZINCATE) capsule 50 mg  50 mg Oral Daily    sodium chloride flush 0.9 % injection 5-40 mL  5-40 mL IntraVENous 2 times per day    sodium chloride flush 0.9 % injection 5-40 mL  5-40 mL IntraVENous PRN    0.9 % sodium chloride infusion   IntraVENous PRN    ondansetron (ZOFRAN-ODT) disintegrating tablet 4 mg  4 mg Oral Q8H PRN    Or    ondansetron (ZOFRAN) injection 4 mg  4 mg IntraVENous Q6H PRN    polyethylene glycol (GLYCOLAX) packet 17 g  17 g Oral Daily PRN    acetaminophen (TYLENOL) tablet 650 mg  650 mg Oral Q6H PRN    Or    acetaminophen (TYLENOL) suppository 650 mg  650 mg Rectal Q6H PRN    enoxaparin (LOVENOX) injection 40 mg  40 mg SubCUTAneous Q24H    piperacillin-tazobactam (ZOSYN) 3,375 mg in sodium chloride 0.9 % 50 mL IVPB (mini-bag)  3,375 mg IntraVENous q8h    glucose chewable tablet 16 g  4 tablet Oral PRN    dextrose 5 % solution  100 mL/hr IntraVENous PRN    dextrose bolus 10% 125 mL  125 mL IntraVENous PRN    Or    dextrose bolus 10% 250 mL  250 mL IntraVENous PRN    glucagon (rDNA) injection 1 mg  1 mg IntraMUSCular PRN    insulin lispro (HUMALOG) injection vial 0-16 Units  0-16 Units SubCUTAneous TID WC    insulin lispro (HUMALOG) injection vial 0-4 Units  0-4 Units SubCUTAneous Nightly       Signed:  Kimberly Reynoso MD    Part of this note may have been written by using a voice dictation software. The note has been proof read but may still contain some grammatical/other typographical errors.

## 2022-05-27 NOTE — CARE COORDINATION
IM Letters Important Letter from Medicare provided to the patient and representative. Oral explanation provided and all questions answered. Signed document placed in the medical record. Copy to patient / representative.

## 2022-05-27 NOTE — CARE COORDINATION
Patient to discharge home later today or tomorrow. The plan is to resume home health nursing services with [facility]. Patient confirmed agreement with this plan. Resumption orders entered and referral initiated to [facility]. No additional discharge needs noted.

## 2022-05-28 VITALS
SYSTOLIC BLOOD PRESSURE: 128 MMHG | HEIGHT: 63 IN | WEIGHT: 173.06 LBS | BODY MASS INDEX: 30.66 KG/M2 | RESPIRATION RATE: 16 BRPM | TEMPERATURE: 99 F | OXYGEN SATURATION: 95 % | DIASTOLIC BLOOD PRESSURE: 62 MMHG | HEART RATE: 75 BPM

## 2022-05-28 LAB
ALBUMIN SERPL-MCNC: 3.2 G/DL (ref 3.2–4.6)
ALBUMIN/GLOB SERPL: 0.8 {RATIO} (ref 1.2–3.5)
ALP SERPL-CCNC: 156 U/L (ref 50–130)
ALT SERPL-CCNC: 20 U/L (ref 12–65)
ANION GAP SERPL CALC-SCNC: 6 MMOL/L (ref 7–16)
AST SERPL-CCNC: 28 U/L (ref 15–37)
BACTERIA SPEC CULT: NORMAL
BASOPHILS # BLD: 0 K/UL (ref 0–0.2)
BASOPHILS NFR BLD: 1 % (ref 0–2)
BILIRUB SERPL-MCNC: 1.1 MG/DL (ref 0.2–1.1)
BUN SERPL-MCNC: 19 MG/DL (ref 8–23)
CALCIUM SERPL-MCNC: 9.1 MG/DL (ref 8.3–10.4)
CHLORIDE SERPL-SCNC: 102 MMOL/L (ref 98–107)
CO2 SERPL-SCNC: 30 MMOL/L (ref 21–32)
CREAT SERPL-MCNC: 0.99 MG/DL (ref 0.6–1)
DIFFERENTIAL METHOD BLD: ABNORMAL
EOSINOPHIL # BLD: 0.1 K/UL (ref 0–0.8)
EOSINOPHIL NFR BLD: 3 % (ref 0.5–7.8)
ERYTHROCYTE [DISTWIDTH] IN BLOOD BY AUTOMATED COUNT: 14.6 % (ref 11.9–14.6)
GLOBULIN SER CALC-MCNC: 4 G/DL (ref 2.3–3.5)
GLUCOSE BLD STRIP.AUTO-MCNC: 210 MG/DL (ref 65–100)
GLUCOSE BLD STRIP.AUTO-MCNC: 241 MG/DL (ref 65–100)
GLUCOSE BLD STRIP.AUTO-MCNC: 309 MG/DL (ref 65–100)
GLUCOSE SERPL-MCNC: 204 MG/DL (ref 65–100)
GRAM STN SPEC: NORMAL
GRAM STN SPEC: NORMAL
HCT VFR BLD AUTO: 31.8 % (ref 35.8–46.3)
HGB BLD-MCNC: 10.4 G/DL (ref 11.7–15.4)
IMM GRANULOCYTES # BLD AUTO: 0 K/UL (ref 0–0.5)
IMM GRANULOCYTES NFR BLD AUTO: 1 % (ref 0–5)
LYMPHOCYTES # BLD: 0.6 K/UL (ref 0.5–4.6)
LYMPHOCYTES NFR BLD: 13 % (ref 13–44)
MCH RBC QN AUTO: 27.8 PG (ref 26.1–32.9)
MCHC RBC AUTO-ENTMCNC: 32.7 G/DL (ref 31.4–35)
MCV RBC AUTO: 85 FL (ref 79.6–97.8)
MONOCYTES # BLD: 0.7 K/UL (ref 0.1–1.3)
MONOCYTES NFR BLD: 16 % (ref 4–12)
NEUTS SEG # BLD: 3 K/UL (ref 1.7–8.2)
NEUTS SEG NFR BLD: 67 % (ref 43–78)
NRBC # BLD: 0 K/UL (ref 0–0.2)
PLATELET # BLD AUTO: 154 K/UL (ref 150–450)
PMV BLD AUTO: 10.6 FL (ref 9.4–12.3)
POTASSIUM SERPL-SCNC: 3.9 MMOL/L (ref 3.5–5.1)
PROT SERPL-MCNC: 7.2 G/DL (ref 6.3–8.2)
RBC # BLD AUTO: 3.74 M/UL (ref 4.05–5.2)
SERVICE CMNT-IMP: ABNORMAL
SERVICE CMNT-IMP: NORMAL
SODIUM SERPL-SCNC: 138 MMOL/L (ref 136–145)
VANCOMYCIN SERPL-MCNC: 14.7 UG/ML
WBC # BLD AUTO: 4.5 K/UL (ref 4.3–11.1)

## 2022-05-28 PROCEDURE — 6370000000 HC RX 637 (ALT 250 FOR IP): Performed by: FAMILY MEDICINE

## 2022-05-28 PROCEDURE — 85025 COMPLETE CBC W/AUTO DIFF WBC: CPT

## 2022-05-28 PROCEDURE — 6360000002 HC RX W HCPCS: Performed by: FAMILY MEDICINE

## 2022-05-28 PROCEDURE — 3331090002 HH PPS REVENUE DEBIT

## 2022-05-28 PROCEDURE — 80053 COMPREHEN METABOLIC PANEL: CPT

## 2022-05-28 PROCEDURE — 80202 ASSAY OF VANCOMYCIN: CPT

## 2022-05-28 PROCEDURE — 82962 GLUCOSE BLOOD TEST: CPT

## 2022-05-28 PROCEDURE — 2580000003 HC RX 258: Performed by: FAMILY MEDICINE

## 2022-05-28 PROCEDURE — 6360000002 HC RX W HCPCS: Performed by: INTERNAL MEDICINE

## 2022-05-28 PROCEDURE — 36415 COLL VENOUS BLD VENIPUNCTURE: CPT

## 2022-05-28 PROCEDURE — 6370000000 HC RX 637 (ALT 250 FOR IP): Performed by: INTERNAL MEDICINE

## 2022-05-28 PROCEDURE — 3331090001 HH PPS REVENUE CREDIT

## 2022-05-28 PROCEDURE — P9047 ALBUMIN (HUMAN), 25%, 50ML: HCPCS | Performed by: INTERNAL MEDICINE

## 2022-05-28 RX ORDER — CEPHALEXIN 500 MG/1
500 CAPSULE ORAL 2 TIMES DAILY
Qty: 14 CAPSULE | Refills: 0 | Status: SHIPPED | OUTPATIENT
Start: 2022-05-28 | End: 2022-06-04

## 2022-05-28 RX ORDER — SULFAMETHOXAZOLE AND TRIMETHOPRIM 800; 160 MG/1; MG/1
1 TABLET ORAL 2 TIMES DAILY
Qty: 14 TABLET | Refills: 0 | Status: SHIPPED | OUTPATIENT
Start: 2022-05-28 | End: 2022-06-04

## 2022-05-28 RX ADMIN — SPIRONOLACTONE 100 MG: 25 TABLET ORAL at 10:56

## 2022-05-28 RX ADMIN — PIPERACILLIN AND TAZOBACTAM 3375 MG: 3; .375 INJECTION, POWDER, LYOPHILIZED, FOR SOLUTION INTRAVENOUS at 10:58

## 2022-05-28 RX ADMIN — LACTULOSE 20 G: 20 SOLUTION ORAL at 10:56

## 2022-05-28 RX ADMIN — BUMETANIDE 1 MG: 1 TABLET ORAL at 15:01

## 2022-05-28 RX ADMIN — ASPIRIN 81 MG 81 MG: 81 TABLET ORAL at 10:56

## 2022-05-28 RX ADMIN — TROSPIUM CHLORIDE 20 MG: 20 TABLET, FILM COATED ORAL at 10:56

## 2022-05-28 RX ADMIN — BUMETANIDE 1 MG: 1 TABLET ORAL at 10:56

## 2022-05-28 RX ADMIN — PIPERACILLIN AND TAZOBACTAM 3375 MG: 3; .375 INJECTION, POWDER, LYOPHILIZED, FOR SOLUTION INTRAVENOUS at 01:08

## 2022-05-28 RX ADMIN — INSULIN LISPRO 12 UNITS: 100 INJECTION, SOLUTION INTRAVENOUS; SUBCUTANEOUS at 13:36

## 2022-05-28 RX ADMIN — INSULIN LISPRO 12 UNITS: 100 INJECTION, SOLUTION INTRAVENOUS; SUBCUTANEOUS at 13:38

## 2022-05-28 RX ADMIN — INSULIN LISPRO 4 UNITS: 100 INJECTION, SOLUTION INTRAVENOUS; SUBCUTANEOUS at 08:00

## 2022-05-28 RX ADMIN — PROPRANOLOL HYDROCHLORIDE 20 MG: 40 TABLET ORAL at 10:56

## 2022-05-28 RX ADMIN — MIDODRINE HYDROCHLORIDE 5 MG: 5 TABLET ORAL at 10:56

## 2022-05-28 RX ADMIN — ESCITALOPRAM OXALATE 5 MG: 10 TABLET ORAL at 10:57

## 2022-05-28 RX ADMIN — INSULIN LISPRO 12 UNITS: 100 INJECTION, SOLUTION INTRAVENOUS; SUBCUTANEOUS at 08:00

## 2022-05-28 RX ADMIN — FERROUS SULFATE TAB 325 MG (65 MG ELEMENTAL FE) 325 MG: 325 (65 FE) TAB at 06:21

## 2022-05-28 RX ADMIN — PANTOPRAZOLE SODIUM 40 MG: 40 TABLET, DELAYED RELEASE ORAL at 10:56

## 2022-05-28 RX ADMIN — SODIUM CHLORIDE, PRESERVATIVE FREE 10 ML: 5 INJECTION INTRAVENOUS at 10:58

## 2022-05-28 RX ADMIN — ALBUMIN (HUMAN) 25 G: 0.25 INJECTION, SOLUTION INTRAVENOUS at 01:14

## 2022-05-28 RX ADMIN — ENOXAPARIN SODIUM 40 MG: 100 INJECTION SUBCUTANEOUS at 10:57

## 2022-05-28 RX ADMIN — INSULIN GLARGINE 38 UNITS: 100 INJECTION, SOLUTION SUBCUTANEOUS at 09:17

## 2022-05-28 RX ADMIN — Medication 50 MG: at 10:56

## 2022-05-28 RX ADMIN — MIDODRINE HYDROCHLORIDE 5 MG: 5 TABLET ORAL at 12:45

## 2022-05-28 RX ADMIN — RIFAXIMIN 550 MG: 550 TABLET ORAL at 10:56

## 2022-05-28 RX ADMIN — Medication 400 MG: at 10:56

## 2022-05-28 NOTE — DISCHARGE SUMMARY
Hospitalist Discharge Summary   Admit Date:  2022  5:38 PM   DC Note date: 2022  Name:  Juan Pablo Miner   Age:  66 y.o. Sex:  female  :  1950   MRN:  [id number]   Room:  [facility]  PCP:  Suni Mcdonough MD    Presenting Complaint: Skin Problem and Abdominal Pain     Initial Admission Diagnosis: Cellulitis of abdominal wall [L03.311]  Abdominal wall cellulitis [L03.311]     Problem List for this Hospitalization (present on admission unless stated otherwise):  Principal Problem:    Abdominal wall cellulitis  Active Problems:    Dyslipidemia    Chronic migraine without aura with status migrainosus, not intractable    Esophageal varices determined by endoscopy (HCC)    RADHA (obstructive sleep apnea)    Hyponatremia    Type II diabetes mellitus ([facility] [address]    Liver cirrhosis secondary to LOWERY (nonalcoholic steatohepatitis) (HCC)    CAD (coronary artery disease)  Resolved Problems:    * No resolved hospital problems. *    Did Patient have Sepsis (YES OR NO): No    Hospital Course:  Please refer to the admission H&P for details of presentation. In summary, Juan Pablo Miner is a 66 y.o. 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 Patient had 800cc of fluids removed from her abdomen via pluerex on . Patient was started on broad-spectrum antibiotics. Ascitic fluid sample was obtained and analysis did not show any signs of action. Ascitic fluid culture is without any WBCs or bacteria. BCx is neg. Remained afebrile throughout the hospitalization. Approximately 2.2L of ascites fluids were removed during the hospitalization. Patient will need to continue PO antibiotics to complete the course and follow up with her PCP and GI on discharge. Patient is medically stable for discharge.  Patient is to continue taking medications as prescribed and to follow up with PCP on discharge. Patient is instructed to to call a physician or return to ED if any concerns/symptoms worsened. Discharge summary and encounter summary was sent to PCP electronically via \"Comm Mgt\" link in [facility], if possible. Disposition: Home  Diet: ADULT DIET; Regular; 3 carb choices (45 gm/meal)  Code Status: Full Code    Follow Ups:   Follow-up Information     Mia Jenkins MD In 1 week. Specialty: Family Medicine  Why: Post-hospitalization Follow up  Contact information:  [address]  [address] [place] [address]  [phone]                       Time spent in patient discharge and coordination 40 minutes. Plan was discussed with patient and sister. All questions answered. Patient was stable at time of discharge. Instructions given to call a physician or return if any concerns.     Current Discharge Medication List      START taking these medications    Details   cephALEXin (KEFLEX) 500 MG capsule Take 1 capsule by mouth 2 times daily for 7 days  Qty: 14 capsule, Refills: 0      sulfamethoxazole-trimethoprim (BACTRIM DS;SEPTRA DS) 800-160 MG per tablet Take 1 tablet by mouth 2 times daily for 7 days  Qty: 14 tablet, Refills: 0         CONTINUE these medications which have NOT CHANGED    Details   escitalopram (LEXAPRO) 5 MG tablet Take 1 tablet by mouth daily  Qty: 30 tablet, Refills: 1      VITAMIN A PO Take 2,400 mcg by mouth daily      aspirin 81 MG chewable tablet Take 81 mg by mouth daily      bumetanide (BUMEX) 1 MG tablet Take 1 mg by mouth 3 times daily      vitamin D3 (CHOLECALCIFEROL) 125 MCG (5000 UT) TABS tablet Take 5,000 Units by mouth daily      ferrous sulfate (IRON 325) 325 (65 Fe) MG tablet Take 325 mg by mouth every morning (before breakfast)      insulin glargine (LANTUS;BASAGLAR) 100 UNIT/ML injection pen Inject 42 Units into the skin 2 times daily      insulin lispro, 1 Unit Dial, 100 UNIT/ML SOPN Inject 20 Units into the skin 3 times daily (before meals)      lactulose (CHRONULAC) 10 GM/15ML solution Take 30 mLs by mouth 3 times daily      magnesium oxide (MAG-OX) 400 (240 Mg) MG tablet Take 400 mg by mouth 2 times daily      midodrine (PROAMATINE) 5 MG tablet Take 5 mg by mouth 3 times daily (with meals)      mirabegron (MYRBETRIQ) 25 MG TB24 TAKE 1 TABLET BY MOUTH EVERY DAY      ondansetron (ZOFRAN-ODT) 4 MG disintegrating tablet Take 4 mg by mouth      pantoprazole (PROTONIX) 40 MG tablet Take 40 mg by mouth 2 times daily      pravastatin (PRAVACHOL) 40 MG tablet Take 40 mg by mouth      propranolol (INDERAL) 20 MG tablet Take 20 mg by mouth 2 times daily      rifAXIMin (XIFAXAN) 550 MG tablet Take 550 mg by mouth 2 times daily      spironolactone (ALDACTONE) 100 MG tablet Take 100 mg by mouth daily      Zinc Sulfate 220 (50 Zn) MG TABS Take 1 tablet by mouth daily         STOP taking these medications       acetaminophen (TYLENOL) 500 MG tablet Comments:   Reason for Stopping:               Procedures done this admission:  * No surgery found *    Consults this admission:  IP CONSULT TO PHARMACY  IP CONSULT HOME HEALTH    Echocardiogram results:  No results found for this or any previous visit. Diagnostic Imaging/Tests:   CT ABDOMEN PELVIS W IV CONTRAST Additional Contrast? None    Result Date: 5/24/2022  CT of the Abdomen and Pelvis with contrast CLINICAL INDICATION:  Subacute worsening severe pain and swelling throughout the abdominal wall with cellulitis, evaluate for abscess. Generalized abdominal distention. Cirrhosis, ascites, 6 4 hypertension, biliary dilatation. Follow-up small bowel obstruction and umbilical hernia. COMPARISON: CT 4/6/2022, radiograph 4/7/2022. TECHNIQUE: Dose reduction technique used: Automated exposure Control and/or adjustment of mA and kV according to patient size. Multiple axial images were obtained through the abdomen and pelvis after intravenous injection of 125cc of isovue 370.  No oral contrast given per request, limiting evaluation of GI tract and surrounding structures. Coronal reformatted images obtained for further evaluation of organs. FINDINGS: Partially included lung bases are unchanged. Partially visualized heart is enlarged, with scattered calcifications. Hiatal hernia and paraesophageal varices have not changed. Abdomen: Cirrhosis, splenomegaly, dilatation of portal vein and splenic veins, extensive upper abdominal varices, cholecystectomy are again evident. No acute changes in the liver or spleen. The adrenal glands and pancreas appear unremarkable. There is normal enhancement of the kidneys, no hydronephrosis. Large volume ascites has slightly increased. Diffuse subcutaneous and skin thickening or noted throughout the body wall. Patent major vessels. Aorta normal caliber. No free air. No interval lymphadenopathy. Drainage catheter inserted via right lower quadrant approach with tip in the left midabdomen. There is no evidence of bowel obstruction. GI evaluation is limited without oral contrast. Large volume stool compatible with constipation. No bowel hernia. Pelvis:  No acute inflammatory changes or fluid collections in the pelvis. No lymphadenopathy or mass. Uterus and ovaries are present. Urinary bladder is unremarkable as seen. Bones: No acute osseous lesions. 1. Previous ventral hernia and small bowel obstruction have resolved. 2. No evidence of abscess. 3. Ascites and anasarca. 4. Cirrhosis, portal hypertension. 5. Cholecystectomy. 6. Constipation.  GI details are limited without oral contrast.        Labs: Results:       BMP, Mg, Phos Recent Labs     05/26/22  0502 05/27/22 0414 05/28/22  0546    138 138   K 3.7 3.8 3.9    100 102   CO2 32 31 30   BUN 23 21 19      CBC Recent Labs     05/26/22  0502 05/27/22 0414 05/28/22  0546   WBC 4.5 3.7* 4.5   RBC 3.96* 3.54* 3.74*   HGB 11.0* 9.9* 10.4*   HCT 33.0* 30.0* 31.8*    160 154      LFT Recent Labs     05/26/22 0502 05/27/22  0414 05/28/22  0546   ALT 29 23 20   GLOB 4.9* 3.6* 4.0*      Cardiac Testing No results found for: BNP, CPK, RCK1, CKMB   Coagulation Tests Lab Results   Component Value Date    INR 1.2 09/24/2021    APTT 30.9 09/24/2021      A1c No results found for: HBA1C   Lipid Panel Lab Results   Component Value Date    CHOL 172 12/08/2021    HDL 48 12/08/2021    VLDL 18 12/08/2021      Thyroid Panel No results found for: TSH, T4, FT4     Most Recent UA Lab Results   Component Value Date    MUCUS 0 04/06/2022    UCOM Microscopic performed on unspun urine. QNS to spin.  04/06/2022          All Labs from Last 24 Hrs:  Recent Results (from the past 24 hour(s))   POCT Glucose    Collection Time: 05/27/22  4:37 PM   Result Value Ref Range    POC Glucose 165 (H) 65 - 100 mg/dL    Performed by: Kris    POCT Glucose    Collection Time: 05/27/22  9:20 PM   Result Value Ref Range    POC Glucose 213 (H) 65 - 100 mg/dL    Performed by: Shonda Jon    Vancomycin Level, Random    Collection Time: 05/28/22  5:46 AM   Result Value Ref Range    Vancomycin Rm 14.7 UG/ML   CBC with Auto Differential    Collection Time: 05/28/22  5:46 AM   Result Value Ref Range    WBC 4.5 4.3 - 11.1 K/uL    RBC 3.74 (L) 4.05 - 5.2 M/uL    Hemoglobin 10.4 (L) 11.7 - 15.4 g/dL    Hematocrit 31.8 (L) 35.8 - 46.3 %    MCV 85.0 79.6 - 97.8 FL    MCH 27.8 26.1 - 32.9 PG    MCHC 32.7 31.4 - 35.0 g/dL    RDW 14.6 11.9 - 14.6 %    Platelets 931 068 - 273 K/uL    MPV 10.6 9.4 - 12.3 FL    nRBC 0.00 0.0 - 0.2 K/uL    Differential Type AUTOMATED      Seg Neutrophils 67 43 - 78 %    Lymphocytes 13 13 - 44 %    Monocytes 16 (H) 4.0 - 12.0 %    Eosinophils % 3 0.5 - 7.8 %    Basophils 1 0.0 - 2.0 %    Immature Granulocytes 1 0.0 - 5.0 %    Segs Absolute 3.0 1.7 - 8.2 K/UL    Absolute Lymph # 0.6 0.5 - 4.6 K/UL    Absolute Mono # 0.7 0.1 - 1.3 K/UL    Absolute Eos # 0.1 0.0 - 0.8 K/UL    Basophils Absolute 0.0 0.0 - 0.2 K/UL    Absolute Immature Granulocyte 0.0 0.0 - 0.5 K/UL   Comprehensive Metabolic Panel    Collection Time: 05/28/22  5:46 AM   Result Value Ref Range    Sodium 138 136 - 145 mmol/L    Potassium 3.9 3.5 - 5.1 mmol/L    Chloride 102 98 - 107 mmol/L    CO2 30 21 - 32 mmol/L    Anion Gap 6 (L) 7 - 16 mmol/L    Glucose 204 (H) 65 - 100 mg/dL    BUN 19 8 - 23 MG/DL    CREATININE 0.99 0.6 - 1.0 MG/DL    GFR African American >60 >60 ml/min/1.73m2    GFR Non- 58 (L) >60 ml/min/1.73m2    Calcium 9.1 8.3 - 10.4 MG/DL    Total Bilirubin 1.1 0.2 - 1.1 MG/DL    ALT 20 12 - 65 U/L    AST 28 15 - 37 U/L    Alk Phosphatase 156 (H) 50 - 130 U/L    Total Protein 7.2 6.3 - 8.2 g/dL    Albumin 3.2 3.2 - 4.6 g/dL    Globulin 4.0 (H) 2.3 - 3.5 g/dL    Albumin/Globulin Ratio 0.8 (L) 1.2 - 3.5     POCT Glucose    Collection Time: 05/28/22  6:20 AM   Result Value Ref Range    POC Glucose 210 (H) 65 - 100 mg/dL    Performed by: Oscar Knott    POCT Glucose    Collection Time: 05/28/22 11:20 AM   Result Value Ref Range    POC Glucose 309 (H) 65 - 100 mg/dL    Performed by: Geraldo        No Known Allergies  Immunization History   Administered Date(s) Administered    COVID-19, Moderna, Primary or Immunocompromised, PF, 100mcg/0.5mL 01/02/2021, 01/21/2021, 03/01/2021, 10/25/2021    Hepatitis A/Hepatitis B (Twinrix) 02/02/2021    Influenza Whole 10/22/2012    Influenza, High Dose (Fluzone 65 yrs and older) 10/14/2013, 11/30/2016, 11/13/2017, 10/16/2018    Influenza, High-dose, Quadv, 65 yrs +, IM (Fluzone) 11/17/2021    Influenza, Quadv, Recombinant, IM PF (Flublok 18 yrs and older) 11/01/2019    Pneumococcal Conjugate 13-valent ([id number]) 11/30/2016    Pneumococcal Polysaccharide ([id number]) 07/07/2014    Tdap (Boostrix, Adacel) 07/07/2014    Zoster Live (Zostavax) 10/22/2012       Recent Vital Data:  Patient Vitals for the past 24 hrs:   Temp Pulse Resp BP SpO2   05/28/22 1227 98.8 °F (37.1 °C) 75 20 (!) 117/56 97 % 05/28/22 0715 98 °F (36.7 °C) 69 18 (!) 102/57 --   05/28/22 0322 98.2 °F (36.8 °C) 73 20 (!) 117/58 97 %   05/27/22 2309 98.8 °F (37.1 °C) 76 16 118/64 96 %   05/27/22 2120 98.8 °F (37.1 °C) 75 18 (!) 105/51 97 %   05/27/22 1515 98 °F (36.7 °C) 78 20 115/62 96 %       Oxygen Therapy  SpO2: 97 %  Pulse Oximeter Device Mode: Intermittent  O2 Device: None (Room air)    Estimated body mass index is 30.66 kg/m² as calculated from the following:    Height as of this encounter: 5' 3\" (1.6 m). Weight as of this encounter: 173 lb 1 oz (78.5 kg). Intake/Output Summary (Last 24 hours) at 5/28/2022 1315  Last data filed at 5/27/2022 1746  Gross per 24 hour   Intake --   Output 500 ml   Net -500 ml         Physical Exam:    General:          Chronically ill appearing. No overt distress  Head:               Normocephalic, atraumatic  Eyes:               Sclerae appear normal.  Pupils equally round. ENT:                Nares appear normal, no drainage. Moist oral mucosa  Neck:               No restricted ROM. Trachea midline   CV:                  RRR. No m/r/g. No jugular venous distension. Lungs:             CTAB. No wheezing. Respirations even, unlabored  Abdomen: Bowel sounds present. Non tender, distended and slightly firm. +pleurex drain Warmth/erythema consistent with cellulitis seen on abdominal wall. Extremities:     No cyanosis or clubbing. No edema  Skin:                No rashes and normal coloration. Warm and dry. Neuro:             CN II-XII grossly intact. Sensation intact. A&Ox3  Psych:             Normal mood and affect. Signed:  Zoie Richardson MD    Part of this note may have been written by using a voice dictation software. The note has been proof read but may still contain some grammatical/other typographical errors.

## 2022-05-28 NOTE — PROGRESS NOTES
Discharge medications reviewed with the patient and appropriate educational materials and side effects teaching were provided.

## 2022-05-29 LAB
BACTERIA SPEC CULT: NORMAL
BACTERIA SPEC CULT: NORMAL
SERVICE CMNT-IMP: NORMAL
SERVICE CMNT-IMP: NORMAL

## 2022-05-29 PROCEDURE — 3331090001 HH PPS REVENUE CREDIT

## 2022-05-29 PROCEDURE — 3331090002 HH PPS REVENUE DEBIT

## 2022-05-29 NOTE — CARE COORDINATION
05/28/22 0885   Social/Functional History   Lives With Family  (sister, brother in law)   Type of [address] One level   Receives Help From [address] Needs assistance   Meal Prep Moderate   Laundry Moderate   Vacuuming Moderate   Cleaning Moderate   Shopping Moderate   Other (Comment) Other (comment)  (sister organizes her meds)   Homemaking Responsibilities No   Ambulation Assistance Independent   Transfer Assistance Independent   Active  No   Mode of Transportation Car   Occupation Retired   Condition of Participation: Discharge Planning   Freedom of Choice list was provided with basic dialogue that supports the patient's individualized plan of care/goals, treatment preferences, and shares the quality data associated with the providers? Yes   Pt. Discharged to home with [facility].

## 2022-05-30 ENCOUNTER — HOME CARE VISIT (OUTPATIENT)
Dept: SCHEDULING | Facility: HOME HEALTH | Age: 72
End: 2022-05-30
Payer: MEDICARE

## 2022-05-30 VITALS
TEMPERATURE: 97.7 F | RESPIRATION RATE: 16 BRPM | DIASTOLIC BLOOD PRESSURE: 82 MMHG | OXYGEN SATURATION: 97 % | SYSTOLIC BLOOD PRESSURE: 132 MMHG | HEART RATE: 80 BPM

## 2022-05-30 PROCEDURE — 3331090002 HH PPS REVENUE DEBIT

## 2022-05-30 PROCEDURE — G0299 HHS/HOSPICE OF RN EA 15 MIN: HCPCS

## 2022-05-30 PROCEDURE — 3331090001 HH PPS REVENUE CREDIT

## 2022-05-31 ENCOUNTER — HOME CARE VISIT (OUTPATIENT)
Dept: SCHEDULING | Facility: HOME HEALTH | Age: 72
End: 2022-05-31
Payer: MEDICARE

## 2022-05-31 VITALS
TEMPERATURE: 97.7 F | OXYGEN SATURATION: 98 % | SYSTOLIC BLOOD PRESSURE: 102 MMHG | HEART RATE: 71 BPM | RESPIRATION RATE: 14 BRPM | DIASTOLIC BLOOD PRESSURE: 50 MMHG

## 2022-05-31 PROCEDURE — 3331090002 HH PPS REVENUE DEBIT

## 2022-05-31 PROCEDURE — G0299 HHS/HOSPICE OF RN EA 15 MIN: HCPCS

## 2022-05-31 PROCEDURE — 3331090001 HH PPS REVENUE CREDIT

## 2022-05-31 RX ORDER — PRAVASTATIN SODIUM 40 MG
TABLET ORAL
Qty: 90 TABLET | Refills: 1 | Status: SHIPPED | OUTPATIENT
Start: 2022-05-31 | End: 2022-08-11 | Stop reason: SINTOL

## 2022-06-01 PROCEDURE — 3331090002 HH PPS REVENUE DEBIT

## 2022-06-01 PROCEDURE — 3331090001 HH PPS REVENUE CREDIT

## 2022-06-02 PROCEDURE — 3331090001 HH PPS REVENUE CREDIT

## 2022-06-02 PROCEDURE — 3331090002 HH PPS REVENUE DEBIT

## 2022-06-03 ENCOUNTER — HOME CARE VISIT (OUTPATIENT)
Dept: SCHEDULING | Facility: HOME HEALTH | Age: 72
End: 2022-06-03
Payer: MEDICARE

## 2022-06-03 VITALS
SYSTOLIC BLOOD PRESSURE: 102 MMHG | TEMPERATURE: 97.5 F | DIASTOLIC BLOOD PRESSURE: 54 MMHG | OXYGEN SATURATION: 97 % | RESPIRATION RATE: 16 BRPM | HEART RATE: 65 BPM

## 2022-06-03 PROCEDURE — G0299 HHS/HOSPICE OF RN EA 15 MIN: HCPCS

## 2022-06-03 PROCEDURE — 3331090001 HH PPS REVENUE CREDIT

## 2022-06-03 PROCEDURE — 3331090002 HH PPS REVENUE DEBIT

## 2022-06-04 PROCEDURE — 3331090001 HH PPS REVENUE CREDIT

## 2022-06-04 PROCEDURE — 3331090002 HH PPS REVENUE DEBIT

## 2022-06-05 PROCEDURE — 3331090002 HH PPS REVENUE DEBIT

## 2022-06-05 PROCEDURE — 3331090001 HH PPS REVENUE CREDIT

## 2022-06-06 ENCOUNTER — HOME CARE VISIT (OUTPATIENT)
Dept: SCHEDULING | Facility: HOME HEALTH | Age: 72
End: 2022-06-06
Payer: MEDICARE

## 2022-06-06 ENCOUNTER — OFFICE VISIT (OUTPATIENT)
Dept: FAMILY MEDICINE CLINIC | Facility: CLINIC | Age: 72
End: 2022-06-06
Payer: MEDICARE

## 2022-06-06 VITALS
HEART RATE: 74 BPM | DIASTOLIC BLOOD PRESSURE: 52 MMHG | HEIGHT: 63 IN | BODY MASS INDEX: 29.46 KG/M2 | SYSTOLIC BLOOD PRESSURE: 114 MMHG | TEMPERATURE: 97.7 F | OXYGEN SATURATION: 97 % | WEIGHT: 166.25 LBS

## 2022-06-06 VITALS
SYSTOLIC BLOOD PRESSURE: 90 MMHG | RESPIRATION RATE: 16 BRPM | OXYGEN SATURATION: 97 % | TEMPERATURE: 97.8 F | HEART RATE: 66 BPM | DIASTOLIC BLOOD PRESSURE: 54 MMHG

## 2022-06-06 DIAGNOSIS — R18.8 OTHER ASCITES: ICD-10-CM

## 2022-06-06 DIAGNOSIS — L30.9 DERMATITIS: ICD-10-CM

## 2022-06-06 DIAGNOSIS — N18.30 STAGE 3 CHRONIC KIDNEY DISEASE, UNSPECIFIED WHETHER STAGE 3A OR 3B CKD (HCC): ICD-10-CM

## 2022-06-06 DIAGNOSIS — K75.81 LIVER CIRRHOSIS SECONDARY TO NASH (HCC): ICD-10-CM

## 2022-06-06 DIAGNOSIS — Z09 HOSPITAL DISCHARGE FOLLOW-UP: Primary | ICD-10-CM

## 2022-06-06 DIAGNOSIS — K74.60 LIVER CIRRHOSIS SECONDARY TO NASH (HCC): ICD-10-CM

## 2022-06-06 PROCEDURE — 3331090001 HH PPS REVENUE CREDIT

## 2022-06-06 PROCEDURE — 1090F PRES/ABSN URINE INCON ASSESS: CPT | Performed by: PHYSICIAN ASSISTANT

## 2022-06-06 PROCEDURE — G8427 DOCREV CUR MEDS BY ELIG CLIN: HCPCS | Performed by: PHYSICIAN ASSISTANT

## 2022-06-06 PROCEDURE — 99213 OFFICE O/P EST LOW 20 MIN: CPT | Performed by: PHYSICIAN ASSISTANT

## 2022-06-06 PROCEDURE — 1123F ACP DISCUSS/DSCN MKR DOCD: CPT | Performed by: PHYSICIAN ASSISTANT

## 2022-06-06 PROCEDURE — 3331090002 HH PPS REVENUE DEBIT

## 2022-06-06 PROCEDURE — G8417 CALC BMI ABV UP PARAM F/U: HCPCS | Performed by: PHYSICIAN ASSISTANT

## 2022-06-06 PROCEDURE — 1036F TOBACCO NON-USER: CPT | Performed by: PHYSICIAN ASSISTANT

## 2022-06-06 PROCEDURE — G0299 HHS/HOSPICE OF RN EA 15 MIN: HCPCS

## 2022-06-06 PROCEDURE — 1111F DSCHRG MED/CURRENT MED MERGE: CPT | Performed by: PHYSICIAN ASSISTANT

## 2022-06-06 PROCEDURE — G8400 PT W/DXA NO RESULTS DOC: HCPCS | Performed by: PHYSICIAN ASSISTANT

## 2022-06-06 PROCEDURE — 3017F COLORECTAL CA SCREEN DOC REV: CPT | Performed by: PHYSICIAN ASSISTANT

## 2022-06-06 RX ORDER — TRIAMCINOLONE ACETONIDE 5 MG/G
CREAM TOPICAL 2 TIMES DAILY PRN
Qty: 454 G | Refills: 0 | Status: SHIPPED | OUTPATIENT
Start: 2022-06-06 | End: 2022-08-11 | Stop reason: SINTOL

## 2022-06-06 NOTE — PROGRESS NOTES
Physician Progress Note      PATIENT:               Mary Velazquez  CSN #:                  [id number]  :                       1950  ADMIT DATE:       2022 5:38 PM  [address] [place] DATE:        2022 5:52 PM  RESPONDING  PROVIDER #:        Nathalie Schulte MD          QUERY TEXT:    Pt admitted with cellulitis. Pt noted to have pleur-ex drain. If possible,   please document in progress notes and discharge summary the relationship, if   any, between cellulitis and pleur-ex drain. The medical record reflects the following:  Risk Factors: pleur-ex drain  Clinical Indicators: \"Abdominal wall cellulitis; In setting of frequent   ascitic fluid drainage via pleur-ex drain. \"  Treatment: broad spectrum abx with vancomycin and zosyn, cultures  Options provided:  -- Abdominal wall cellulitis due to pleur-ex drain  -- Abdominal wall cellulitis unrelated to pleur-ex drain  -- Other - I will add my own diagnosis  -- Disagree - Not applicable / Not valid  -- Disagree - Clinically unable to determine / Unknown  -- Refer to Clinical Documentation Reviewer    PROVIDER RESPONSE TEXT:    This patient has abdominal wall cellulitis due to pleur-ex drain.     Query created by: Nessa Salinas on 2022 9:52 AM      Electronically signed by:  Nathalie Schulte MD 2022 7:23 AM

## 2022-06-06 NOTE — PROGRESS NOTES
Chief Complaint   Patient presents with    Follow-up     hospital discharge 5/28/22, itching on abdomin       HISTORY OF PRESENT ILLNESS:  Georgina Valencia is a very pleasant 66 y.o. female who presents for a hospital discharge follow up. she was admitted to [facility]] on 05/24/22 for cellulitis of her abdominal wall after placement of pleurax for management of ascites due to cirrhosis from LOWERY. She was started on broad-spectrum antibiotics. Ascitic fluid culture was without any WBCs or bacteria. She was afebrile during hospitalization, and approximately 2.2L of ascites fluids were removed during the hospitalization. She was discharged home on 05/28/22 and has completed bactrim and keflex. Her abdominal skin redness has resolved, although the skin is very itchy. She is accompanied by her two sisters today. She has not experienced fevers, cough, recurrence of cellulitis, diarrhea since returning home. She is undergoing paracentesis with pleurax 2-3 times per week.       Discharged home on 05/28/22. TCV phone call: not performed    PAST MEDICAL HISTORY  Current Outpatient Medications   Medication Sig    triamcinolone (ARISTOCORT) 0.5 % cream Apply topically 2 times daily as needed (itching) Apply topically 3 times daily.     pravastatin (PRAVACHOL) 40 MG tablet TAKE 1 TABLET BY MOUTH EVERY DAY AT NIGHT    escitalopram (LEXAPRO) 5 MG tablet Take 1 tablet by mouth daily    VITAMIN A PO Take 2,400 mcg by mouth daily    aspirin 81 MG chewable tablet Take 81 mg by mouth daily    bumetanide (BUMEX) 1 MG tablet Take 1 mg by mouth 3 times daily    vitamin D3 (CHOLECALCIFEROL) 125 MCG (5000 UT) TABS tablet Take 5,000 Units by mouth daily    ferrous sulfate (IRON 325) 325 (65 Fe) MG tablet Take 325 mg by mouth every morning (before breakfast)    insulin glargine (LANTUS;BASAGLAR) 100 UNIT/ML injection pen Inject 42 Units into the skin 2 times daily    insulin lispro, 1 Unit Dial, 100 UNIT/ML SOPN Inject 20 Units into the skin 3 times daily (before meals)    magnesium oxide (MAG-OX) 400 (240 Mg) MG tablet Take 400 mg by mouth 2 times daily    midodrine (PROAMATINE) 5 MG tablet Take 5 mg by mouth 3 times daily (with meals)    mirabegron (MYRBETRIQ) 25 MG TB24 TAKE 1 TABLET BY MOUTH EVERY DAY    ondansetron (ZOFRAN-ODT) 4 MG disintegrating tablet Take 4 mg by mouth    pantoprazole (PROTONIX) 40 MG tablet Take 40 mg by mouth 2 times daily    propranolol (INDERAL) 20 MG tablet Take 20 mg by mouth 2 times daily    rifAXIMin (XIFAXAN) 550 MG tablet Take 550 mg by mouth 2 times daily    spironolactone (ALDACTONE) 100 MG tablet Take 100 mg by mouth daily    Zinc Sulfate 220 (50 Zn) MG TABS Take 1 tablet by mouth daily     No current facility-administered medications for this visit. No Known Allergies   Past Medical History:   Diagnosis Date    [facility]     Diabetes ([facility]      No family history on file. Social History     Socioeconomic History    Marital status:      Spouse name: Not on file    Number of children: Not on file    Years of education: Not on file    Highest education level: Not on file   Occupational History    Not on file   Tobacco Use    Smoking status: Never Smoker    Smokeless tobacco: Never Used   Vaping Use    Vaping Use: Never used   Substance and Sexual Activity    Alcohol use: Not Currently    Drug use: Never    Sexual activity: Not Currently   Other Topics Concern    Not on file   Social History Narrative    Not on file     Social Determinants of Health     Financial Resource Strain:     Difficulty of Paying Living Expenses: Not on file   Food Insecurity:     Worried About Running Out of Food in the Last Year: Not on file    Derek of Food in the Last Year: Not on file   Transportation Needs:     Lack of Transportation (Medical): Not on file    Lack of Transportation (Non-Medical):  Not on file   Physical Activity:     Days of Exercise per Week: Not on file   [employer] of Exercise per Session: Not on file   Stress:     Feeling of Stress : Not on file   Social Connections:     Frequency of Communication with Friends and Family: Not on file    Frequency of Social Gatherings with Friends and Family: Not on file    Attends Pentec[employer]al Services: Not on file    Active Member of Clubs or Organizations: Not on file    Attends Club or Organization Meetings: Not on file    Marital Status: Not on file   Intimate Partner Violence:     Fear of Current or Ex-Partner: Not on file    Emotionally Abused: Not on file    Physically Abused: Not on file    Sexually Abused: Not on file   Housing Stability:     Unable to Pay for Housing in the Last Year: Not on file    Number of [place] in the Last Year: Not on file    Unstable Housing in the Last Year: Not on file     Past Surgical History:   Procedure Laterality Date    HERNIA REPAIR  2022    IR INSERT TUNNELED PLEURA CATH W CUFF  5/5/2022    IR INSERT TUNNELED PLEURA CATH W CUFF  5/5/2022    IR INSERT TUNNELED PLEURA CATH W CUFF 5/5/2022 [facility] SPECI[facility]    IR US GUIDED PARACENTESIS W IMAGING  11/22/2021    IR US GUIDED PARACENTESIS W IMAGING  10/28/2021    [address]  10/4/2021    IR US GUIDED PARACENTESIS W IMAGING  4/28/2022    IR US GUIDED PARACENTESIS W IMAGING  4/20/2022    IR US GUIDED PARACENTESIS W IMAGING  4/12/2022    IR US GUIDED PARACENTESIS W IMAGING  4/6/2022    IR US GUIDED PARACENTESIS W IMAGING  3/16/2022    IR US GUIDED PARACENTESIS W IMAGING  1/31/2022    IR US GUIDED PARACENTESIS W IMAGING  3/25/2022    AK ABDOMEN SURGERY [address] UNLISTED         Review of Systems   Constitutional: Positive for fatigue. Negative for activity change, appetite change, fever and unexpected weight change. Gastrointestinal: Positive for abdominal distention. Negative for abdominal pain, blood in stool, constipation, diarrhea, nausea and vomiting.    Genitourinary: Positive for frequency. Skin:        Abdominal skin itching        VITAL SIGNS:  Blood pressure (!) 114/52, pulse 74, temperature 97.7 °F (36.5 °C), temperature source Temporal, height 5' 3\" (1.6 m), weight 166 lb 4 oz (75.4 kg), SpO2 97 %. Body mass index is 29.45 kg/m². Physical Exam  Vitals reviewed. Constitutional:       Appearance: Normal appearance. She is obese. HENT:      Head: Normocephalic and atraumatic. Right Ear: External ear normal.      Left Ear: External ear normal.   Eyes:      Conjunctiva/sclera: Conjunctivae normal.   Cardiovascular:      Rate and Rhythm: Normal rate and regular rhythm. Heart sounds: Normal heart sounds. No murmur heard. Pulmonary:      Effort: Pulmonary effort is normal.      Breath sounds: Normal breath sounds. Abdominal:      General: Abdomen is protuberant. Bowel sounds are normal.      Palpations: Abdomen is soft. Tenderness: There is no abdominal tenderness. Skin:     General: Skin is warm and dry. Comments: No erythema of abdomen, there is some residual hyperpigmentation and skin dryness. Pleurax in place   Neurological:      Mental Status: She is alert and oriented to person, place, and time. Psychiatric:         Mood and Affect: Mood normal.         Behavior: Behavior normal.        ASSESSMENT AND PLAN:   Nehal Doyle was seen today for follow-up. Diagnoses and all orders for this visit:    Hospital discharge follow-up    Liver cirrhosis secondary to LOWERY ([facility] [address]    Dermatitis  -     triamcinolone (ARISTOCORT) 0.5 % cream; Apply topically 2 times daily as needed (itching) Apply topically 3 times daily. Other ascites    Stage 3 chronic kidney disease, unspecified whether stage 3a or 3b CKD (HCC)    Continue current medications as prescribed. She may apply steroid cream to itchy areas on abdomen for up to 2 weeks. She should monitor for recurrence of redness, skin induration, fever/chills, and call or go to ED if these symptoms develop.  Follow up with GI as scheduled.      Reagan Duvall PA-C

## 2022-06-07 PROCEDURE — 3331090002 HH PPS REVENUE DEBIT

## 2022-06-07 PROCEDURE — 3331090001 HH PPS REVENUE CREDIT

## 2022-06-08 ENCOUNTER — HOME CARE VISIT (OUTPATIENT)
Dept: SCHEDULING | Facility: HOME HEALTH | Age: 72
End: 2022-06-08
Payer: MEDICARE

## 2022-06-08 PROBLEM — N18.30 CHRONIC RENAL DISEASE, STAGE III (HCC): Status: ACTIVE | Noted: 2022-06-08

## 2022-06-08 PROCEDURE — 3331090002 HH PPS REVENUE DEBIT

## 2022-06-08 PROCEDURE — G0299 HHS/HOSPICE OF RN EA 15 MIN: HCPCS

## 2022-06-08 PROCEDURE — 3331090001 HH PPS REVENUE CREDIT

## 2022-06-08 ASSESSMENT — ENCOUNTER SYMPTOMS
NAUSEA: 0
VOMITING: 0
DIARRHEA: 0
CONSTIPATION: 0
ABDOMINAL PAIN: 0
ABDOMINAL DISTENTION: 1
BLOOD IN STOOL: 0

## 2022-06-09 ENCOUNTER — HOSPITAL ENCOUNTER (OUTPATIENT)
Dept: INTERVENTIONAL RADIOLOGY/VASCULAR | Age: 72
Discharge: HOME | End: 2022-06-12
Payer: MEDICARE

## 2022-06-09 VITALS
HEART RATE: 65 BPM | OXYGEN SATURATION: 97 % | DIASTOLIC BLOOD PRESSURE: 60 MMHG | TEMPERATURE: 97.5 F | SYSTOLIC BLOOD PRESSURE: 88 MMHG | RESPIRATION RATE: 16 BRPM

## 2022-06-09 DIAGNOSIS — R18.8 OTHER ASCITES: ICD-10-CM

## 2022-06-09 PROCEDURE — 87205 SMEAR GRAM STAIN: CPT

## 2022-06-09 PROCEDURE — 3331090001 HH PPS REVENUE CREDIT

## 2022-06-09 PROCEDURE — 3331090002 HH PPS REVENUE DEBIT

## 2022-06-09 PROCEDURE — C1729 CATH, DRAINAGE: HCPCS

## 2022-06-10 ENCOUNTER — HOME CARE VISIT (OUTPATIENT)
Dept: SCHEDULING | Facility: HOME HEALTH | Age: 72
End: 2022-06-10
Payer: MEDICARE

## 2022-06-10 PROCEDURE — 3331090002 HH PPS REVENUE DEBIT

## 2022-06-10 PROCEDURE — 89050 BODY FLUID CELL COUNT: CPT

## 2022-06-10 PROCEDURE — 3331090001 HH PPS REVENUE CREDIT

## 2022-06-10 PROCEDURE — G0299 HHS/HOSPICE OF RN EA 15 MIN: HCPCS

## 2022-06-11 VITALS
SYSTOLIC BLOOD PRESSURE: 98 MMHG | DIASTOLIC BLOOD PRESSURE: 56 MMHG | TEMPERATURE: 98.1 F | OXYGEN SATURATION: 97 % | HEART RATE: 64 BPM | RESPIRATION RATE: 16 BRPM

## 2022-06-11 LAB
APPEARANCE FLD: NORMAL
COLOR FLD: NORMAL
LYMPHOCYTES NFR BRONCH MANUAL: 84 %
MACROPHAGES NFR BRONCH MANUAL: 15 %
NEUTROPHILS NFR BRONCH MANUAL: 1 %
NUC CELL # FLD: 248 /CU MM
RBC # FLD: 6000 /CU MM
SPECIMEN SOURCE FLD: NORMAL

## 2022-06-11 PROCEDURE — 3331090002 HH PPS REVENUE DEBIT

## 2022-06-11 PROCEDURE — 3331090001 HH PPS REVENUE CREDIT

## 2022-06-12 LAB
BACTERIA SPEC CULT: NORMAL
GRAM STN SPEC: NORMAL
GRAM STN SPEC: NORMAL
SERVICE CMNT-IMP: NORMAL

## 2022-06-12 PROCEDURE — 3331090002 HH PPS REVENUE DEBIT

## 2022-06-12 PROCEDURE — 3331090001 HH PPS REVENUE CREDIT

## 2022-06-13 ENCOUNTER — HOME CARE VISIT (OUTPATIENT)
Dept: SCHEDULING | Facility: HOME HEALTH | Age: 72
End: 2022-06-13
Payer: MEDICARE

## 2022-06-13 VITALS
OXYGEN SATURATION: 97 % | DIASTOLIC BLOOD PRESSURE: 56 MMHG | TEMPERATURE: 98.2 F | SYSTOLIC BLOOD PRESSURE: 106 MMHG | HEART RATE: 65 BPM | RESPIRATION RATE: 16 BRPM

## 2022-06-13 PROCEDURE — G0299 HHS/HOSPICE OF RN EA 15 MIN: HCPCS

## 2022-06-13 PROCEDURE — 3331090001 HH PPS REVENUE CREDIT

## 2022-06-13 PROCEDURE — 3331090002 HH PPS REVENUE DEBIT

## 2022-06-14 PROCEDURE — 3331090002 HH PPS REVENUE DEBIT

## 2022-06-14 PROCEDURE — 3331090001 HH PPS REVENUE CREDIT

## 2022-06-15 ENCOUNTER — HOME CARE VISIT (OUTPATIENT)
Dept: SCHEDULING | Facility: HOME HEALTH | Age: 72
End: 2022-06-15
Payer: MEDICARE

## 2022-06-15 VITALS
TEMPERATURE: 97.7 F | DIASTOLIC BLOOD PRESSURE: 64 MMHG | RESPIRATION RATE: 17 BRPM | HEART RATE: 73 BPM | OXYGEN SATURATION: 97 % | SYSTOLIC BLOOD PRESSURE: 110 MMHG

## 2022-06-15 PROCEDURE — 3331090001 HH PPS REVENUE CREDIT

## 2022-06-15 PROCEDURE — 3331090002 HH PPS REVENUE DEBIT

## 2022-06-15 PROCEDURE — G0299 HHS/HOSPICE OF RN EA 15 MIN: HCPCS

## 2022-06-16 PROCEDURE — 3331090002 HH PPS REVENUE DEBIT

## 2022-06-16 PROCEDURE — 3331090001 HH PPS REVENUE CREDIT

## 2022-06-17 ENCOUNTER — HOME CARE VISIT (OUTPATIENT)
Dept: SCHEDULING | Facility: HOME HEALTH | Age: 72
End: 2022-06-17
Payer: MEDICARE

## 2022-06-17 VITALS
RESPIRATION RATE: 16 BRPM | HEART RATE: 71 BPM | TEMPERATURE: 98.5 F | OXYGEN SATURATION: 95 % | SYSTOLIC BLOOD PRESSURE: 100 MMHG | DIASTOLIC BLOOD PRESSURE: 58 MMHG

## 2022-06-17 PROCEDURE — 3331090002 HH PPS REVENUE DEBIT

## 2022-06-17 PROCEDURE — 3331090001 HH PPS REVENUE CREDIT

## 2022-06-17 PROCEDURE — G0299 HHS/HOSPICE OF RN EA 15 MIN: HCPCS

## 2022-06-18 PROCEDURE — 3331090003 HH PPS REVENUE ADJ

## 2022-06-18 PROCEDURE — 3331090001 HH PPS REVENUE CREDIT

## 2022-06-18 PROCEDURE — 3331090002 HH PPS REVENUE DEBIT

## 2022-06-19 PROCEDURE — 3331090002 HH PPS REVENUE DEBIT

## 2022-06-19 PROCEDURE — 3331090001 HH PPS REVENUE CREDIT

## 2022-06-20 ENCOUNTER — HOME CARE VISIT (OUTPATIENT)
Dept: SCHEDULING | Facility: HOME HEALTH | Age: 72
End: 2022-06-20
Payer: MEDICARE

## 2022-06-20 VITALS
RESPIRATION RATE: 17 BRPM | SYSTOLIC BLOOD PRESSURE: 96 MMHG | DIASTOLIC BLOOD PRESSURE: 56 MMHG | TEMPERATURE: 98 F | OXYGEN SATURATION: 97 % | HEART RATE: 66 BPM

## 2022-06-20 PROCEDURE — 3331090001 HH PPS REVENUE CREDIT

## 2022-06-20 PROCEDURE — G0299 HHS/HOSPICE OF RN EA 15 MIN: HCPCS

## 2022-06-20 PROCEDURE — 3331090002 HH PPS REVENUE DEBIT

## 2022-06-20 PROCEDURE — 400014 HH F/U

## 2022-06-21 ENCOUNTER — OFFICE VISIT (OUTPATIENT)
Dept: FAMILY MEDICINE CLINIC | Facility: CLINIC | Age: 72
End: 2022-06-21
Payer: MEDICARE

## 2022-06-21 VITALS
DIASTOLIC BLOOD PRESSURE: 62 MMHG | TEMPERATURE: 97 F | OXYGEN SATURATION: 96 % | HEIGHT: 63 IN | BODY MASS INDEX: 29.06 KG/M2 | WEIGHT: 164 LBS | SYSTOLIC BLOOD PRESSURE: 102 MMHG | HEART RATE: 70 BPM

## 2022-06-21 DIAGNOSIS — E11.9 TYPE 2 DIABETES MELLITUS WITHOUT COMPLICATION, WITH LONG-TERM CURRENT USE OF INSULIN (HCC): ICD-10-CM

## 2022-06-21 DIAGNOSIS — Z79.4 TYPE 2 DIABETES MELLITUS WITHOUT COMPLICATION, WITH LONG-TERM CURRENT USE OF INSULIN (HCC): ICD-10-CM

## 2022-06-21 DIAGNOSIS — R35.0 URINARY FREQUENCY: ICD-10-CM

## 2022-06-21 DIAGNOSIS — K75.81 LIVER CIRRHOSIS SECONDARY TO NASH (HCC): Primary | ICD-10-CM

## 2022-06-21 DIAGNOSIS — F32.A DEPRESSION, UNSPECIFIED DEPRESSION TYPE: ICD-10-CM

## 2022-06-21 DIAGNOSIS — K74.60 LIVER CIRRHOSIS SECONDARY TO NASH (HCC): Primary | ICD-10-CM

## 2022-06-21 PROCEDURE — 3052F HG A1C>EQUAL 8.0%<EQUAL 9.0%: CPT | Performed by: STUDENT IN AN ORGANIZED HEALTH CARE EDUCATION/TRAINING PROGRAM

## 2022-06-21 PROCEDURE — 3017F COLORECTAL CA SCREEN DOC REV: CPT | Performed by: STUDENT IN AN ORGANIZED HEALTH CARE EDUCATION/TRAINING PROGRAM

## 2022-06-21 PROCEDURE — 3331090001 HH PPS REVENUE CREDIT

## 2022-06-21 PROCEDURE — G8417 CALC BMI ABV UP PARAM F/U: HCPCS | Performed by: STUDENT IN AN ORGANIZED HEALTH CARE EDUCATION/TRAINING PROGRAM

## 2022-06-21 PROCEDURE — 1090F PRES/ABSN URINE INCON ASSESS: CPT | Performed by: STUDENT IN AN ORGANIZED HEALTH CARE EDUCATION/TRAINING PROGRAM

## 2022-06-21 PROCEDURE — 1036F TOBACCO NON-USER: CPT | Performed by: STUDENT IN AN ORGANIZED HEALTH CARE EDUCATION/TRAINING PROGRAM

## 2022-06-21 PROCEDURE — 3331090002 HH PPS REVENUE DEBIT

## 2022-06-21 PROCEDURE — 1123F ACP DISCUSS/DSCN MKR DOCD: CPT | Performed by: STUDENT IN AN ORGANIZED HEALTH CARE EDUCATION/TRAINING PROGRAM

## 2022-06-21 PROCEDURE — 1111F DSCHRG MED/CURRENT MED MERGE: CPT | Performed by: STUDENT IN AN ORGANIZED HEALTH CARE EDUCATION/TRAINING PROGRAM

## 2022-06-21 PROCEDURE — G8427 DOCREV CUR MEDS BY ELIG CLIN: HCPCS | Performed by: STUDENT IN AN ORGANIZED HEALTH CARE EDUCATION/TRAINING PROGRAM

## 2022-06-21 PROCEDURE — 99214 OFFICE O/P EST MOD 30 MIN: CPT | Performed by: STUDENT IN AN ORGANIZED HEALTH CARE EDUCATION/TRAINING PROGRAM

## 2022-06-21 PROCEDURE — 2022F DILAT RTA XM EVC RTNOPTHY: CPT | Performed by: STUDENT IN AN ORGANIZED HEALTH CARE EDUCATION/TRAINING PROGRAM

## 2022-06-21 PROCEDURE — G8400 PT W/DXA NO RESULTS DOC: HCPCS | Performed by: STUDENT IN AN ORGANIZED HEALTH CARE EDUCATION/TRAINING PROGRAM

## 2022-06-21 RX ORDER — SPIRONOLACTONE 100 MG/1
100 TABLET, FILM COATED ORAL DAILY
Qty: 30 TABLET | Refills: 5 | Status: SHIPPED | OUTPATIENT
Start: 2022-06-21 | End: 2022-08-11 | Stop reason: SINTOL

## 2022-06-21 RX ORDER — TOLTERODINE TARTRATE 1 MG/1
1 TABLET, EXTENDED RELEASE ORAL 2 TIMES DAILY
Qty: 60 TABLET | Refills: 2 | Status: SHIPPED | OUTPATIENT
Start: 2022-06-21 | End: 2022-06-23

## 2022-06-21 ASSESSMENT — ANXIETY QUESTIONNAIRES
GAD7 TOTAL SCORE: 0
IF YOU CHECKED OFF ANY PROBLEMS ON THIS QUESTIONNAIRE, HOW DIFFICULT HAVE THESE PROBLEMS MADE IT FOR YOU TO DO YOUR WORK, TAKE CARE OF THINGS AT HOME, OR GET ALONG WITH OTHER PEOPLE: NOT DIFFICULT AT ALL
2. NOT BEING ABLE TO STOP OR CONTROL WORRYING: 0
7. FEELING AFRAID AS IF SOMETHING AWFUL MIGHT HAPPEN: 0
5. BEING SO RESTLESS THAT IT IS HARD TO SIT STILL: 0
3. WORRYING TOO MUCH ABOUT DIFFERENT THINGS: 0
4. TROUBLE RELAXING: 0
1. FEELING NERVOUS, ANXIOUS, OR ON EDGE: 0
6. BECOMING EASILY ANNOYED OR IRRITABLE: 0

## 2022-06-21 ASSESSMENT — PATIENT HEALTH QUESTIONNAIRE - PHQ9
5. POOR APPETITE OR OVEREATING: 0
SUM OF ALL RESPONSES TO PHQ QUESTIONS 1-9: 0
SUM OF ALL RESPONSES TO PHQ QUESTIONS 1-9: 0
1. LITTLE INTEREST OR PLEASURE IN DOING THINGS: 0
2. FEELING DOWN, DEPRESSED OR HOPELESS: 0
9. THOUGHTS THAT YOU WOULD BE BETTER OFF DEAD, OR OF HURTING YOURSELF: 0
4. FEELING TIRED OR HAVING LITTLE ENERGY: 0
SUM OF ALL RESPONSES TO PHQ QUESTIONS 1-9: 0
6. FEELING BAD ABOUT YOURSELF - OR THAT YOU ARE A FAILURE OR HAVE LET YOURSELF OR YOUR FAMILY DOWN: 0
3. TROUBLE FALLING OR STAYING ASLEEP: 0
8. MOVING OR SPEAKING SO SLOWLY THAT OTHER PEOPLE COULD HAVE NOTICED. OR THE OPPOSITE, BEING SO FIGETY OR RESTLESS THAT YOU HAVE BEEN MOVING AROUND A LOT MORE THAN USUAL: 0
SUM OF ALL RESPONSES TO PHQ QUESTIONS 1-9: 0
10. IF YOU CHECKED OFF ANY PROBLEMS, HOW DIFFICULT HAVE THESE PROBLEMS MADE IT FOR YOU TO DO YOUR WORK, TAKE CARE OF THINGS AT HOME, OR GET ALONG WITH OTHER PEOPLE: 0
7. TROUBLE CONCENTRATING ON THINGS, SUCH AS READING THE NEWSPAPER OR WATCHING TELEVISION: 0
SUM OF ALL RESPONSES TO PHQ9 QUESTIONS 1 & 2: 0

## 2022-06-21 NOTE — PROGRESS NOTES
Pulmonary effort is normal.      Breath sounds: Normal breath sounds. Abdominal:      General: There is distension. Musculoskeletal:      Right lower leg: No edema. Left lower leg: No edema. Skin:     General: Skin is warm and dry. Neurological:      General: No focal deficit present. Mental Status: She is alert and oriented to person, place, and time. An electronic signature was used to authenticate this note.     --Devante Sheldon MD

## 2022-06-22 ENCOUNTER — HOME CARE VISIT (OUTPATIENT)
Dept: SCHEDULING | Facility: HOME HEALTH | Age: 72
End: 2022-06-22
Payer: MEDICARE

## 2022-06-22 ENCOUNTER — TELEPHONE (OUTPATIENT)
Dept: FAMILY MEDICINE CLINIC | Facility: CLINIC | Age: 72
End: 2022-06-22

## 2022-06-22 VITALS
TEMPERATURE: 98.1 F | DIASTOLIC BLOOD PRESSURE: 52 MMHG | SYSTOLIC BLOOD PRESSURE: 110 MMHG | HEART RATE: 70 BPM | OXYGEN SATURATION: 70 % | RESPIRATION RATE: 12 BRPM

## 2022-06-22 DIAGNOSIS — R53.81 DEBILITY: ICD-10-CM

## 2022-06-22 DIAGNOSIS — K75.81 LIVER CIRRHOSIS SECONDARY TO NASH (NONALCOHOLIC STEATOHEPATITIS) (HCC): Primary | ICD-10-CM

## 2022-06-22 DIAGNOSIS — K74.60 LIVER CIRRHOSIS SECONDARY TO NASH (NONALCOHOLIC STEATOHEPATITIS) (HCC): Primary | ICD-10-CM

## 2022-06-22 PROCEDURE — 3331090002 HH PPS REVENUE DEBIT

## 2022-06-22 PROCEDURE — G0299 HHS/HOSPICE OF RN EA 15 MIN: HCPCS

## 2022-06-22 PROCEDURE — 3331090001 HH PPS REVENUE CREDIT

## 2022-06-22 NOTE — TELEPHONE ENCOUNTER
Call from [facility] home care stating that Danielito Owens and sister ,Camila Glenna are wanting to get order for Palliative care. If approve please place an order.

## 2022-06-23 ENCOUNTER — TELEPHONE (OUTPATIENT)
Dept: FAMILY MEDICINE CLINIC | Facility: CLINIC | Age: 72
End: 2022-06-23

## 2022-06-23 DIAGNOSIS — R53.81 DEBILITY: ICD-10-CM

## 2022-06-23 DIAGNOSIS — K75.81 LIVER CIRRHOSIS SECONDARY TO NASH (NONALCOHOLIC STEATOHEPATITIS) (HCC): Primary | ICD-10-CM

## 2022-06-23 DIAGNOSIS — K74.60 LIVER CIRRHOSIS SECONDARY TO NASH (NONALCOHOLIC STEATOHEPATITIS) (HCC): Primary | ICD-10-CM

## 2022-06-23 PROCEDURE — 3331090002 HH PPS REVENUE DEBIT

## 2022-06-23 PROCEDURE — 3331090001 HH PPS REVENUE CREDIT

## 2022-06-23 RX ORDER — SOLIFENACIN SUCCINATE 5 MG/1
5 TABLET, FILM COATED ORAL DAILY
Qty: 30 TABLET | Refills: 5 | Status: SHIPPED | OUTPATIENT
Start: 2022-06-23 | End: 2022-08-11 | Stop reason: SINTOL

## 2022-06-24 ENCOUNTER — HOME CARE VISIT (OUTPATIENT)
Dept: SCHEDULING | Facility: HOME HEALTH | Age: 72
End: 2022-06-24
Payer: MEDICARE

## 2022-06-24 PROCEDURE — 3331090002 HH PPS REVENUE DEBIT

## 2022-06-24 PROCEDURE — G0299 HHS/HOSPICE OF RN EA 15 MIN: HCPCS

## 2022-06-24 PROCEDURE — 3331090001 HH PPS REVENUE CREDIT

## 2022-06-25 PROCEDURE — 3331090002 HH PPS REVENUE DEBIT

## 2022-06-25 PROCEDURE — 3331090001 HH PPS REVENUE CREDIT

## 2022-06-26 VITALS
SYSTOLIC BLOOD PRESSURE: 98 MMHG | OXYGEN SATURATION: 98 % | TEMPERATURE: 97.3 F | HEART RATE: 73 BPM | DIASTOLIC BLOOD PRESSURE: 56 MMHG | RESPIRATION RATE: 16 BRPM

## 2022-06-26 PROCEDURE — 3331090001 HH PPS REVENUE CREDIT

## 2022-06-26 PROCEDURE — 3331090002 HH PPS REVENUE DEBIT

## 2022-06-27 ENCOUNTER — HOSPITAL ENCOUNTER (OUTPATIENT)
Dept: LAB | Age: 72
Discharge: HOME | End: 2022-06-30
Payer: MEDICARE

## 2022-06-27 ENCOUNTER — HOME CARE VISIT (OUTPATIENT)
Dept: SCHEDULING | Facility: HOME HEALTH | Age: 72
End: 2022-06-27
Payer: MEDICARE

## 2022-06-27 VITALS
DIASTOLIC BLOOD PRESSURE: 52 MMHG | OXYGEN SATURATION: 96 % | TEMPERATURE: 98.1 F | HEART RATE: 70 BPM | SYSTOLIC BLOOD PRESSURE: 94 MMHG | RESPIRATION RATE: 16 BRPM

## 2022-06-27 PROCEDURE — 87205 SMEAR GRAM STAIN: CPT

## 2022-06-27 PROCEDURE — G0299 HHS/HOSPICE OF RN EA 15 MIN: HCPCS

## 2022-06-27 PROCEDURE — 3331090002 HH PPS REVENUE DEBIT

## 2022-06-27 PROCEDURE — 89050 BODY FLUID CELL COUNT: CPT

## 2022-06-27 PROCEDURE — 3331090001 HH PPS REVENUE CREDIT

## 2022-06-28 LAB
APPEARANCE FLD: NORMAL
COLOR FLD: NORMAL
EOSINOPHIL NFR BRONCH MANUAL: 1 %
LYMPHOCYTES NFR BRONCH MANUAL: 82 %
MACROPHAGES NFR BRONCH MANUAL: 17 %
NUC CELL # FLD: 167 /CU MM
OTHER CELLS NFR BLD MANUAL: 12 %
RBC # FLD: NORMAL /CU MM
SPECIMEN SOURCE FLD: NORMAL

## 2022-06-28 PROCEDURE — 3331090001 HH PPS REVENUE CREDIT

## 2022-06-28 PROCEDURE — 3331090002 HH PPS REVENUE DEBIT

## 2022-06-29 ENCOUNTER — HOME CARE VISIT (OUTPATIENT)
Dept: SCHEDULING | Facility: HOME HEALTH | Age: 72
End: 2022-06-29
Payer: MEDICARE

## 2022-06-29 VITALS
HEART RATE: 74 BPM | OXYGEN SATURATION: 97 % | DIASTOLIC BLOOD PRESSURE: 60 MMHG | TEMPERATURE: 98.5 F | SYSTOLIC BLOOD PRESSURE: 108 MMHG | RESPIRATION RATE: 16 BRPM

## 2022-06-29 PROCEDURE — 3331090002 HH PPS REVENUE DEBIT

## 2022-06-29 PROCEDURE — 3331090001 HH PPS REVENUE CREDIT

## 2022-06-29 PROCEDURE — G0299 HHS/HOSPICE OF RN EA 15 MIN: HCPCS

## 2022-06-30 LAB
BACTERIA SPEC CULT: NORMAL
GRAM STN SPEC: NORMAL
GRAM STN SPEC: NORMAL
SERVICE CMNT-IMP: NORMAL

## 2022-06-30 PROCEDURE — 3331090001 HH PPS REVENUE CREDIT

## 2022-06-30 PROCEDURE — 3331090002 HH PPS REVENUE DEBIT

## 2022-07-01 ENCOUNTER — TELEPHONE (OUTPATIENT)
Dept: FAMILY MEDICINE CLINIC | Facility: CLINIC | Age: 72
End: 2022-07-01

## 2022-07-01 ENCOUNTER — HOME CARE VISIT (OUTPATIENT)
Dept: SCHEDULING | Facility: HOME HEALTH | Age: 72
End: 2022-07-01
Payer: MEDICARE

## 2022-07-01 PROCEDURE — G0299 HHS/HOSPICE OF RN EA 15 MIN: HCPCS

## 2022-07-01 PROCEDURE — 3331090001 HH PPS REVENUE CREDIT

## 2022-07-01 PROCEDURE — 3331090002 HH PPS REVENUE DEBIT

## 2022-07-01 NOTE — TELEPHONE ENCOUNTER
Rhett Cook calling as pt abd is red and angry looking again. She has had cellulitis in past. Her drain is draining pink thick drainage. Advised pt to go to ER. Inquired as a Palliative referral was placed. Pt declined at this time.

## 2022-07-02 PROCEDURE — 3331090001 HH PPS REVENUE CREDIT

## 2022-07-02 PROCEDURE — 3331090002 HH PPS REVENUE DEBIT

## 2022-07-03 PROCEDURE — 3331090002 HH PPS REVENUE DEBIT

## 2022-07-03 PROCEDURE — 3331090001 HH PPS REVENUE CREDIT

## 2022-07-04 ENCOUNTER — HOME CARE VISIT (OUTPATIENT)
Dept: SCHEDULING | Facility: HOME HEALTH | Age: 72
End: 2022-07-04
Payer: MEDICARE

## 2022-07-04 VITALS
HEART RATE: 67 BPM | TEMPERATURE: 98.1 F | DIASTOLIC BLOOD PRESSURE: 58 MMHG | RESPIRATION RATE: 16 BRPM | OXYGEN SATURATION: 99 % | SYSTOLIC BLOOD PRESSURE: 104 MMHG

## 2022-07-04 VITALS
RESPIRATION RATE: 16 BRPM | SYSTOLIC BLOOD PRESSURE: 94 MMHG | TEMPERATURE: 98.3 F | HEART RATE: 64 BPM | OXYGEN SATURATION: 98 % | DIASTOLIC BLOOD PRESSURE: 54 MMHG

## 2022-07-04 PROCEDURE — 3331090002 HH PPS REVENUE DEBIT

## 2022-07-04 PROCEDURE — G0299 HHS/HOSPICE OF RN EA 15 MIN: HCPCS

## 2022-07-04 PROCEDURE — 3331090001 HH PPS REVENUE CREDIT

## 2022-07-05 PROCEDURE — 3331090001 HH PPS REVENUE CREDIT

## 2022-07-05 PROCEDURE — 3331090002 HH PPS REVENUE DEBIT

## 2022-07-06 ENCOUNTER — HOME CARE VISIT (OUTPATIENT)
Dept: SCHEDULING | Facility: HOME HEALTH | Age: 72
End: 2022-07-06
Payer: MEDICARE

## 2022-07-06 VITALS
DIASTOLIC BLOOD PRESSURE: 60 MMHG | RESPIRATION RATE: 18 BRPM | OXYGEN SATURATION: 96 % | TEMPERATURE: 97.6 F | SYSTOLIC BLOOD PRESSURE: 103 MMHG | HEART RATE: 68 BPM

## 2022-07-06 PROCEDURE — 3331090001 HH PPS REVENUE CREDIT

## 2022-07-06 PROCEDURE — G0299 HHS/HOSPICE OF RN EA 15 MIN: HCPCS

## 2022-07-06 PROCEDURE — 3331090002 HH PPS REVENUE DEBIT

## 2022-07-07 PROCEDURE — 3331090002 HH PPS REVENUE DEBIT

## 2022-07-07 PROCEDURE — 3331090001 HH PPS REVENUE CREDIT

## 2022-07-08 ENCOUNTER — HOME CARE VISIT (OUTPATIENT)
Dept: SCHEDULING | Facility: HOME HEALTH | Age: 72
End: 2022-07-08
Payer: MEDICARE

## 2022-07-08 VITALS
DIASTOLIC BLOOD PRESSURE: 64 MMHG | TEMPERATURE: 97.1 F | RESPIRATION RATE: 17 BRPM | HEART RATE: 62 BPM | SYSTOLIC BLOOD PRESSURE: 118 MMHG | OXYGEN SATURATION: 96 %

## 2022-07-08 PROCEDURE — 3331090002 HH PPS REVENUE DEBIT

## 2022-07-08 PROCEDURE — G0299 HHS/HOSPICE OF RN EA 15 MIN: HCPCS

## 2022-07-08 PROCEDURE — 3331090001 HH PPS REVENUE CREDIT

## 2022-07-09 PROCEDURE — 3331090002 HH PPS REVENUE DEBIT

## 2022-07-09 PROCEDURE — 3331090001 HH PPS REVENUE CREDIT

## 2022-07-10 PROCEDURE — 3331090002 HH PPS REVENUE DEBIT

## 2022-07-10 PROCEDURE — 3331090001 HH PPS REVENUE CREDIT

## 2022-07-11 ENCOUNTER — HOME CARE VISIT (OUTPATIENT)
Dept: SCHEDULING | Facility: HOME HEALTH | Age: 72
End: 2022-07-11
Payer: MEDICARE

## 2022-07-11 VITALS
DIASTOLIC BLOOD PRESSURE: 54 MMHG | HEART RATE: 67 BPM | OXYGEN SATURATION: 98 % | TEMPERATURE: 97.8 F | RESPIRATION RATE: 12 BRPM | SYSTOLIC BLOOD PRESSURE: 96 MMHG

## 2022-07-11 PROCEDURE — 3331090002 HH PPS REVENUE DEBIT

## 2022-07-11 PROCEDURE — 3331090001 HH PPS REVENUE CREDIT

## 2022-07-11 PROCEDURE — G0299 HHS/HOSPICE OF RN EA 15 MIN: HCPCS

## 2022-07-12 PROCEDURE — 3331090002 HH PPS REVENUE DEBIT

## 2022-07-12 PROCEDURE — 3331090001 HH PPS REVENUE CREDIT

## 2022-07-13 ENCOUNTER — HOME CARE VISIT (OUTPATIENT)
Dept: SCHEDULING | Facility: HOME HEALTH | Age: 72
End: 2022-07-13
Payer: MEDICARE

## 2022-07-13 ENCOUNTER — HOME HEALTH ADMISSION (OUTPATIENT)
Dept: HOME HEALTH SERVICES | Facility: HOME HEALTH | Age: 72
End: 2022-07-13
Payer: MEDICARE

## 2022-07-13 ENCOUNTER — NURSE ONLY (OUTPATIENT)
Dept: FAMILY MEDICINE CLINIC | Facility: CLINIC | Age: 72
End: 2022-07-13
Payer: MEDICARE

## 2022-07-13 VITALS
TEMPERATURE: 98.2 F | RESPIRATION RATE: 16 BRPM | HEART RATE: 66 BPM | DIASTOLIC BLOOD PRESSURE: 56 MMHG | SYSTOLIC BLOOD PRESSURE: 98 MMHG | OXYGEN SATURATION: 98 %

## 2022-07-13 DIAGNOSIS — R31.9 HEMATURIA, UNSPECIFIED TYPE: Primary | ICD-10-CM

## 2022-07-13 LAB
BILIRUBIN, URINE, POC: NEGATIVE
BLOOD URINE, POC: ABNORMAL
GLUCOSE URINE, POC: 250
KETONES, URINE, POC: NEGATIVE
LEUKOCYTE ESTERASE, URINE, POC: ABNORMAL
NITRITE, URINE, POC: NEGATIVE
PH, URINE, POC: 5 (ref 4.6–8)
PROTEIN,URINE, POC: ABNORMAL
SPECIFIC GRAVITY, URINE, POC: 1 (ref 1–1.03)
URINALYSIS CLARITY, POC: ABNORMAL
URINALYSIS COLOR, POC: YELLOW
UROBILINOGEN, POC: ABNORMAL

## 2022-07-13 PROCEDURE — 3331090002 HH PPS REVENUE DEBIT

## 2022-07-13 PROCEDURE — 81003 URINALYSIS AUTO W/O SCOPE: CPT | Performed by: STUDENT IN AN ORGANIZED HEALTH CARE EDUCATION/TRAINING PROGRAM

## 2022-07-13 PROCEDURE — G0299 HHS/HOSPICE OF RN EA 15 MIN: HCPCS

## 2022-07-13 PROCEDURE — 3331090001 HH PPS REVENUE CREDIT

## 2022-07-13 RX ORDER — NITROFURANTOIN 25; 75 MG/1; MG/1
100 CAPSULE ORAL 2 TIMES DAILY
Qty: 10 CAPSULE | Refills: 0 | Status: SHIPPED | OUTPATIENT
Start: 2022-07-13 | End: 2022-07-18

## 2022-07-14 PROCEDURE — 3331090002 HH PPS REVENUE DEBIT

## 2022-07-14 PROCEDURE — 3331090001 HH PPS REVENUE CREDIT

## 2022-07-15 ENCOUNTER — HOME CARE VISIT (OUTPATIENT)
Dept: SCHEDULING | Facility: HOME HEALTH | Age: 72
End: 2022-07-15
Payer: MEDICARE

## 2022-07-15 ENCOUNTER — HOME CARE VISIT (OUTPATIENT)
Dept: HOME HEALTH SERVICES | Facility: HOME HEALTH | Age: 72
End: 2022-07-15
Payer: MEDICARE

## 2022-07-15 PROCEDURE — G0299 HHS/HOSPICE OF RN EA 15 MIN: HCPCS

## 2022-07-15 PROCEDURE — 3331090001 HH PPS REVENUE CREDIT

## 2022-07-15 PROCEDURE — 3331090002 HH PPS REVENUE DEBIT

## 2022-07-15 RX ORDER — ESCITALOPRAM OXALATE 5 MG/1
TABLET ORAL
Qty: 30 TABLET | Refills: 1 | Status: SHIPPED | OUTPATIENT
Start: 2022-07-15 | End: 2022-08-11 | Stop reason: SINTOL

## 2022-07-16 LAB
BACTERIA SPEC CULT: ABNORMAL
BACTERIA SPEC CULT: ABNORMAL
SERVICE CMNT-IMP: ABNORMAL

## 2022-07-16 PROCEDURE — 3331090001 HH PPS REVENUE CREDIT

## 2022-07-16 PROCEDURE — 3331090002 HH PPS REVENUE DEBIT

## 2022-07-17 VITALS
HEART RATE: 62 BPM | RESPIRATION RATE: 16 BRPM | DIASTOLIC BLOOD PRESSURE: 52 MMHG | SYSTOLIC BLOOD PRESSURE: 96 MMHG | TEMPERATURE: 98.6 F | OXYGEN SATURATION: 97 %

## 2022-07-17 PROCEDURE — 3331090001 HH PPS REVENUE CREDIT

## 2022-07-17 PROCEDURE — 3331090002 HH PPS REVENUE DEBIT

## 2022-07-17 ASSESSMENT — ENCOUNTER SYMPTOMS
STOOL DESCRIPTION: SOFT FORMED
PAIN LOCATION - PAIN QUALITY: ACHE
ABDOMINAL PAIN: 1

## 2022-07-18 ENCOUNTER — HOME CARE VISIT (OUTPATIENT)
Dept: SCHEDULING | Facility: HOME HEALTH | Age: 72
End: 2022-07-18
Payer: MEDICARE

## 2022-07-18 ENCOUNTER — TELEPHONE (OUTPATIENT)
Dept: FAMILY MEDICINE CLINIC | Facility: CLINIC | Age: 72
End: 2022-07-18

## 2022-07-18 VITALS
SYSTOLIC BLOOD PRESSURE: 98 MMHG | DIASTOLIC BLOOD PRESSURE: 56 MMHG | RESPIRATION RATE: 16 BRPM | OXYGEN SATURATION: 96 % | HEART RATE: 66 BPM | TEMPERATURE: 97.9 F

## 2022-07-18 PROCEDURE — 3331090001 HH PPS REVENUE CREDIT

## 2022-07-18 PROCEDURE — G0299 HHS/HOSPICE OF RN EA 15 MIN: HCPCS

## 2022-07-18 PROCEDURE — 3331090002 HH PPS REVENUE DEBIT

## 2022-07-18 RX ORDER — PROPRANOLOL HYDROCHLORIDE 20 MG/1
20 TABLET ORAL 2 TIMES DAILY
Qty: 180 TABLET | Refills: 1 | Status: SHIPPED | OUTPATIENT
Start: 2022-07-18 | End: 2022-08-11 | Stop reason: SINTOL

## 2022-07-18 RX ORDER — ESCITALOPRAM OXALATE 5 MG/1
TABLET ORAL
Qty: 30 TABLET | Refills: 1 | OUTPATIENT
Start: 2022-07-18

## 2022-07-18 NOTE — TELEPHONE ENCOUNTER
----- Message from Jing Martinez MD sent at 7/16/2022  1:53 PM EDT -----  Urine culture grew E. coli that is sensitive to Macrobid.

## 2022-07-19 PROCEDURE — 1090000002 HH PPS REVENUE DEBIT

## 2022-07-19 PROCEDURE — 1090000001 HH PPS REVENUE CREDIT

## 2022-07-20 ENCOUNTER — HOME CARE VISIT (OUTPATIENT)
Dept: SCHEDULING | Facility: HOME HEALTH | Age: 72
End: 2022-07-20
Payer: MEDICARE

## 2022-07-20 VITALS
HEART RATE: 62 BPM | SYSTOLIC BLOOD PRESSURE: 92 MMHG | RESPIRATION RATE: 16 BRPM | TEMPERATURE: 98.4 F | DIASTOLIC BLOOD PRESSURE: 52 MMHG | OXYGEN SATURATION: 97 %

## 2022-07-20 PROCEDURE — 1090000001 HH PPS REVENUE CREDIT

## 2022-07-20 PROCEDURE — G0299 HHS/HOSPICE OF RN EA 15 MIN: HCPCS

## 2022-07-20 PROCEDURE — 1090000002 HH PPS REVENUE DEBIT

## 2022-07-20 PROCEDURE — 400014 HH F/U

## 2022-07-20 ASSESSMENT — ENCOUNTER SYMPTOMS
PAIN LOCATION - PAIN QUALITY: ACHY
ABDOMINAL PAIN: 1
STOOL DESCRIPTION: SOFT FORMED

## 2022-07-21 PROCEDURE — 1090000001 HH PPS REVENUE CREDIT

## 2022-07-21 PROCEDURE — 1090000002 HH PPS REVENUE DEBIT

## 2022-07-22 ENCOUNTER — HOME CARE VISIT (OUTPATIENT)
Dept: SCHEDULING | Facility: HOME HEALTH | Age: 72
End: 2022-07-22
Payer: MEDICARE

## 2022-07-22 ENCOUNTER — NURSE ONLY (OUTPATIENT)
Dept: FAMILY MEDICINE CLINIC | Facility: CLINIC | Age: 72
End: 2022-07-22
Payer: MEDICARE

## 2022-07-22 DIAGNOSIS — R31.9 HEMATURIA, UNSPECIFIED TYPE: Primary | ICD-10-CM

## 2022-07-22 LAB
BILIRUBIN, URINE, POC: NEGATIVE
BLOOD URINE, POC: ABNORMAL
GLUCOSE URINE, POC: ABNORMAL
KETONES, URINE, POC: NEGATIVE
LEUKOCYTE ESTERASE, URINE, POC: NEGATIVE
NITRITE, URINE, POC: NEGATIVE
PH, URINE, POC: 6 (ref 4.6–8)
PROTEIN,URINE, POC: NEGATIVE
SPECIFIC GRAVITY, URINE, POC: 1.01 (ref 1–1.03)
URINALYSIS CLARITY, POC: CLEAR
URINALYSIS COLOR, POC: YELLOW
UROBILINOGEN, POC: 0.2

## 2022-07-22 PROCEDURE — 81003 URINALYSIS AUTO W/O SCOPE: CPT | Performed by: STUDENT IN AN ORGANIZED HEALTH CARE EDUCATION/TRAINING PROGRAM

## 2022-07-22 PROCEDURE — 1090000002 HH PPS REVENUE DEBIT

## 2022-07-22 PROCEDURE — 1090000001 HH PPS REVENUE CREDIT

## 2022-07-22 PROCEDURE — G0299 HHS/HOSPICE OF RN EA 15 MIN: HCPCS

## 2022-07-23 VITALS
HEART RATE: 67 BPM | OXYGEN SATURATION: 97 % | DIASTOLIC BLOOD PRESSURE: 60 MMHG | RESPIRATION RATE: 16 BRPM | TEMPERATURE: 98.5 F | SYSTOLIC BLOOD PRESSURE: 100 MMHG

## 2022-07-23 PROCEDURE — 1090000002 HH PPS REVENUE DEBIT

## 2022-07-23 PROCEDURE — 1090000001 HH PPS REVENUE CREDIT

## 2022-07-23 ASSESSMENT — ENCOUNTER SYMPTOMS
PAIN LOCATION - PAIN QUALITY: ACHE
ABDOMINAL PAIN: 1
STOOL DESCRIPTION: SOFT FORMED

## 2022-07-24 PROCEDURE — 1090000002 HH PPS REVENUE DEBIT

## 2022-07-24 PROCEDURE — 1090000001 HH PPS REVENUE CREDIT

## 2022-07-25 ENCOUNTER — HOME CARE VISIT (OUTPATIENT)
Dept: SCHEDULING | Facility: HOME HEALTH | Age: 72
End: 2022-07-25
Payer: MEDICARE

## 2022-07-25 ENCOUNTER — TELEPHONE (OUTPATIENT)
Dept: FAMILY MEDICINE CLINIC | Facility: CLINIC | Age: 72
End: 2022-07-25

## 2022-07-25 VITALS
OXYGEN SATURATION: 95 % | HEART RATE: 70 BPM | SYSTOLIC BLOOD PRESSURE: 104 MMHG | DIASTOLIC BLOOD PRESSURE: 56 MMHG | RESPIRATION RATE: 12 BRPM | TEMPERATURE: 96.8 F

## 2022-07-25 LAB
BACTERIA SPEC CULT: ABNORMAL
BACTERIA SPEC CULT: ABNORMAL
SERVICE CMNT-IMP: ABNORMAL

## 2022-07-25 PROCEDURE — 1090000002 HH PPS REVENUE DEBIT

## 2022-07-25 PROCEDURE — 1090000001 HH PPS REVENUE CREDIT

## 2022-07-25 PROCEDURE — G0299 HHS/HOSPICE OF RN EA 15 MIN: HCPCS

## 2022-07-25 RX ORDER — NITROFURANTOIN 25; 75 MG/1; MG/1
100 CAPSULE ORAL 2 TIMES DAILY
Qty: 10 CAPSULE | Refills: 0 | Status: SHIPPED | OUTPATIENT
Start: 2022-07-25 | End: 2022-08-11 | Stop reason: SINTOL

## 2022-07-25 ASSESSMENT — ENCOUNTER SYMPTOMS: PAIN LOCATION - PAIN QUALITY: SHARP

## 2022-07-26 ENCOUNTER — HOME CARE VISIT (OUTPATIENT)
Dept: HOME HEALTH SERVICES | Facility: HOME HEALTH | Age: 72
End: 2022-07-26
Payer: MEDICARE

## 2022-07-26 PROCEDURE — 1090000001 HH PPS REVENUE CREDIT

## 2022-07-26 PROCEDURE — 1090000002 HH PPS REVENUE DEBIT

## 2022-07-27 ENCOUNTER — HOME CARE VISIT (OUTPATIENT)
Dept: SCHEDULING | Facility: HOME HEALTH | Age: 72
End: 2022-07-27
Payer: MEDICARE

## 2022-07-27 VITALS
SYSTOLIC BLOOD PRESSURE: 110 MMHG | RESPIRATION RATE: 16 BRPM | HEART RATE: 71 BPM | TEMPERATURE: 98.3 F | DIASTOLIC BLOOD PRESSURE: 60 MMHG | OXYGEN SATURATION: 99 %

## 2022-07-27 PROCEDURE — 1090000002 HH PPS REVENUE DEBIT

## 2022-07-27 PROCEDURE — G0299 HHS/HOSPICE OF RN EA 15 MIN: HCPCS

## 2022-07-27 PROCEDURE — 1090000001 HH PPS REVENUE CREDIT

## 2022-07-27 ASSESSMENT — ENCOUNTER SYMPTOMS
PAIN LOCATION - PAIN QUALITY: ACHE
STOOL DESCRIPTION: SOFT FORMED
ABDOMINAL PAIN: 1

## 2022-07-28 PROCEDURE — 1090000002 HH PPS REVENUE DEBIT

## 2022-07-28 PROCEDURE — 1090000001 HH PPS REVENUE CREDIT

## 2022-07-29 ENCOUNTER — HOME CARE VISIT (OUTPATIENT)
Dept: SCHEDULING | Facility: HOME HEALTH | Age: 72
End: 2022-07-29
Payer: MEDICARE

## 2022-07-29 PROCEDURE — 1090000002 HH PPS REVENUE DEBIT

## 2022-07-29 PROCEDURE — 1090000001 HH PPS REVENUE CREDIT

## 2022-07-29 PROCEDURE — G0299 HHS/HOSPICE OF RN EA 15 MIN: HCPCS

## 2022-07-30 PROCEDURE — 1090000001 HH PPS REVENUE CREDIT

## 2022-07-30 PROCEDURE — 1090000002 HH PPS REVENUE DEBIT

## 2022-07-31 VITALS
TEMPERATURE: 97.2 F | DIASTOLIC BLOOD PRESSURE: 70 MMHG | HEART RATE: 70 BPM | OXYGEN SATURATION: 97 % | SYSTOLIC BLOOD PRESSURE: 116 MMHG | RESPIRATION RATE: 16 BRPM

## 2022-07-31 PROCEDURE — 1090000001 HH PPS REVENUE CREDIT

## 2022-07-31 PROCEDURE — 1090000002 HH PPS REVENUE DEBIT

## 2022-08-01 ENCOUNTER — HOME CARE VISIT (OUTPATIENT)
Dept: SCHEDULING | Facility: HOME HEALTH | Age: 72
End: 2022-08-01
Payer: MEDICARE

## 2022-08-01 VITALS
TEMPERATURE: 97.9 F | OXYGEN SATURATION: 97 % | RESPIRATION RATE: 16 BRPM | SYSTOLIC BLOOD PRESSURE: 98 MMHG | DIASTOLIC BLOOD PRESSURE: 56 MMHG | HEART RATE: 66 BPM

## 2022-08-01 PROCEDURE — 1090000002 HH PPS REVENUE DEBIT

## 2022-08-01 PROCEDURE — 1090000001 HH PPS REVENUE CREDIT

## 2022-08-01 PROCEDURE — G0299 HHS/HOSPICE OF RN EA 15 MIN: HCPCS

## 2022-08-01 ASSESSMENT — ENCOUNTER SYMPTOMS
STOOL DESCRIPTION: SOFT FORMED
PAIN LOCATION - PAIN QUALITY: ACHE

## 2022-08-02 PROCEDURE — 1090000002 HH PPS REVENUE DEBIT

## 2022-08-02 PROCEDURE — 1090000001 HH PPS REVENUE CREDIT

## 2022-08-03 ENCOUNTER — HOME CARE VISIT (OUTPATIENT)
Dept: SCHEDULING | Facility: HOME HEALTH | Age: 72
End: 2022-08-03
Payer: MEDICARE

## 2022-08-03 VITALS
OXYGEN SATURATION: 98 % | RESPIRATION RATE: 16 BRPM | TEMPERATURE: 97.5 F | HEART RATE: 70 BPM | DIASTOLIC BLOOD PRESSURE: 60 MMHG | SYSTOLIC BLOOD PRESSURE: 102 MMHG

## 2022-08-03 PROCEDURE — 1090000002 HH PPS REVENUE DEBIT

## 2022-08-03 PROCEDURE — G0299 HHS/HOSPICE OF RN EA 15 MIN: HCPCS

## 2022-08-03 PROCEDURE — 1090000001 HH PPS REVENUE CREDIT

## 2022-08-03 ASSESSMENT — ENCOUNTER SYMPTOMS
ABDOMINAL PAIN: 1
STOOL DESCRIPTION: SOFT FORMED

## 2022-08-04 PROCEDURE — 1090000001 HH PPS REVENUE CREDIT

## 2022-08-04 PROCEDURE — 1090000002 HH PPS REVENUE DEBIT

## 2022-08-04 RX ORDER — BUMETANIDE 1 MG/1
1 TABLET ORAL 3 TIMES DAILY
Qty: 90 TABLET | Refills: 5 | Status: SHIPPED | OUTPATIENT
Start: 2022-08-04 | End: 2022-08-11 | Stop reason: SINTOL

## 2022-08-04 RX ORDER — MIDODRINE HYDROCHLORIDE 5 MG/1
5 TABLET ORAL
Qty: 90 TABLET | Refills: 5 | Status: SHIPPED | OUTPATIENT
Start: 2022-08-04 | End: 2022-08-11 | Stop reason: SINTOL

## 2022-08-05 ENCOUNTER — HOME CARE VISIT (OUTPATIENT)
Dept: SCHEDULING | Facility: HOME HEALTH | Age: 72
End: 2022-08-05
Payer: MEDICARE

## 2022-08-05 PROCEDURE — 1090000002 HH PPS REVENUE DEBIT

## 2022-08-05 PROCEDURE — 1090000001 HH PPS REVENUE CREDIT

## 2022-08-05 PROCEDURE — G0299 HHS/HOSPICE OF RN EA 15 MIN: HCPCS

## 2022-08-06 VITALS
OXYGEN SATURATION: 98 % | SYSTOLIC BLOOD PRESSURE: 106 MMHG | RESPIRATION RATE: 16 BRPM | DIASTOLIC BLOOD PRESSURE: 60 MMHG | HEART RATE: 68 BPM | TEMPERATURE: 99.1 F

## 2022-08-06 PROCEDURE — 1090000001 HH PPS REVENUE CREDIT

## 2022-08-06 PROCEDURE — 1090000002 HH PPS REVENUE DEBIT

## 2022-08-06 ASSESSMENT — ENCOUNTER SYMPTOMS
PAIN LOCATION - PAIN QUALITY: ACHE
STOOL DESCRIPTION: SOFT FORMED

## 2022-08-07 PROCEDURE — 1090000002 HH PPS REVENUE DEBIT

## 2022-08-07 PROCEDURE — 1090000001 HH PPS REVENUE CREDIT

## 2022-08-08 ENCOUNTER — HOME CARE VISIT (OUTPATIENT)
Dept: SCHEDULING | Facility: HOME HEALTH | Age: 72
End: 2022-08-08
Payer: MEDICARE

## 2022-08-08 VITALS
DIASTOLIC BLOOD PRESSURE: 54 MMHG | HEART RATE: 62 BPM | SYSTOLIC BLOOD PRESSURE: 92 MMHG | RESPIRATION RATE: 16 BRPM | TEMPERATURE: 98.1 F | OXYGEN SATURATION: 97 %

## 2022-08-08 PROCEDURE — G0299 HHS/HOSPICE OF RN EA 15 MIN: HCPCS

## 2022-08-08 PROCEDURE — 1090000002 HH PPS REVENUE DEBIT

## 2022-08-08 PROCEDURE — 1090000001 HH PPS REVENUE CREDIT

## 2022-08-08 ASSESSMENT — ENCOUNTER SYMPTOMS
STOOL DESCRIPTION: SOFT FORMED
ABDOMINAL PAIN: 1
PAIN LOCATION - PAIN QUALITY: ACHE

## 2022-08-09 ENCOUNTER — HOSPICE ADMISSION (OUTPATIENT)
Dept: HOSPICE | Facility: HOSPICE | Age: 72
End: 2022-08-09
Payer: MEDICARE

## 2022-08-09 DIAGNOSIS — Z79.4 TYPE 2 DIABETES MELLITUS WITH HYPERGLYCEMIA, WITH LONG-TERM CURRENT USE OF INSULIN (HCC): ICD-10-CM

## 2022-08-09 DIAGNOSIS — E11.65 TYPE 2 DIABETES MELLITUS WITH HYPERGLYCEMIA, WITH LONG-TERM CURRENT USE OF INSULIN (HCC): ICD-10-CM

## 2022-08-09 DIAGNOSIS — K74.60 LIVER CIRRHOSIS SECONDARY TO NASH (NONALCOHOLIC STEATOHEPATITIS) (HCC): Primary | ICD-10-CM

## 2022-08-09 DIAGNOSIS — K75.81 LIVER CIRRHOSIS SECONDARY TO NASH (NONALCOHOLIC STEATOHEPATITIS) (HCC): Primary | ICD-10-CM

## 2022-08-09 DIAGNOSIS — R53.81 DEBILITY: ICD-10-CM

## 2022-08-09 DIAGNOSIS — G93.40 ENCEPHALOPATHY: ICD-10-CM

## 2022-08-09 PROCEDURE — 1090000002 HH PPS REVENUE DEBIT

## 2022-08-09 PROCEDURE — 1090000001 HH PPS REVENUE CREDIT

## 2022-08-10 ENCOUNTER — HOME CARE VISIT (OUTPATIENT)
Dept: HOSPICE | Facility: HOSPICE | Age: 72
End: 2022-08-10
Payer: MEDICARE

## 2022-08-10 ENCOUNTER — HOME CARE VISIT (OUTPATIENT)
Dept: SCHEDULING | Facility: HOME HEALTH | Age: 72
End: 2022-08-10
Payer: MEDICARE

## 2022-08-10 VITALS
DIASTOLIC BLOOD PRESSURE: 58 MMHG | OXYGEN SATURATION: 99 % | RESPIRATION RATE: 18 BRPM | SYSTOLIC BLOOD PRESSURE: 96 MMHG | TEMPERATURE: 98.3 F | HEART RATE: 60 BPM

## 2022-08-10 VITALS
HEART RATE: 70 BPM | BODY MASS INDEX: 29.06 KG/M2 | OXYGEN SATURATION: 98 % | TEMPERATURE: 98 F | HEIGHT: 63 IN | DIASTOLIC BLOOD PRESSURE: 67 MMHG | WEIGHT: 164 LBS | SYSTOLIC BLOOD PRESSURE: 116 MMHG | RESPIRATION RATE: 18 BRPM

## 2022-08-10 PROCEDURE — 1090000002 HH PPS REVENUE DEBIT

## 2022-08-10 PROCEDURE — G0299 HHS/HOSPICE OF RN EA 15 MIN: HCPCS

## 2022-08-10 PROCEDURE — 6500000001 HSPC ELECTION

## 2022-08-10 PROCEDURE — 1090000001 HH PPS REVENUE CREDIT

## 2022-08-10 PROCEDURE — 0651 HSPC ROUTINE HOME CARE

## 2022-08-10 ASSESSMENT — ENCOUNTER SYMPTOMS
STOOL DESCRIPTION: SOFT FORMED
PAIN LOCATION - PAIN QUALITY: SHARP
ABDOMINAL PAIN: 1
STOOL DESCRIPTION: FORMED

## 2022-08-10 NOTE — HOSPICE
time. RN educated patient and family on hospice process and philosophy, medication management, pain control, skin care and protection, fall precautions, home safety, pleurex catheter care, and social distancing due to COVID-19. Pt history, assessment, meds and status reviewed with patient's hospice attending MD, Dr. Iris Barnes. Latia Gaspar and Katherine TAYLOR  made aware of volunteer services but pending at this time due to COVID-19; plans to reassess volunteer needs once volunteer services become available. Patient is appropriate for hospice services at this time. HHA none at this time. SW and SC services accepted. Caregiver made aware that an RN will be completing a follow up visit within 24 hours. Handwritten medication list, Brad Abbot book and magnet with [facility] phone number left in home. RN educated caregiver to call [facility] 24/7 phone line with any changes, concerns or falls with verbalized understanding.

## 2022-08-11 ENCOUNTER — HOME CARE VISIT (OUTPATIENT)
Dept: HOME HEALTH SERVICES | Facility: HOME HEALTH | Age: 72
End: 2022-08-11
Payer: MEDICARE

## 2022-08-11 ENCOUNTER — HOME CARE VISIT (OUTPATIENT)
Dept: SCHEDULING | Facility: HOME HEALTH | Age: 72
End: 2022-08-11
Payer: MEDICARE

## 2022-08-11 VITALS
TEMPERATURE: 98.2 F | DIASTOLIC BLOOD PRESSURE: 92 MMHG | RESPIRATION RATE: 16 BRPM | SYSTOLIC BLOOD PRESSURE: 142 MMHG | HEART RATE: 83 BPM

## 2022-08-11 PROCEDURE — 2500000001 HSPC NON INJECTABLE MED

## 2022-08-11 PROCEDURE — 1090000001 HH PPS REVENUE CREDIT

## 2022-08-11 PROCEDURE — 1090000002 HH PPS REVENUE DEBIT

## 2022-08-11 PROCEDURE — 0651 HSPC ROUTINE HOME CARE

## 2022-08-11 PROCEDURE — G0299 HHS/HOSPICE OF RN EA 15 MIN: HCPCS

## 2022-08-12 ENCOUNTER — HOME CARE VISIT (OUTPATIENT)
Dept: SCHEDULING | Facility: HOME HEALTH | Age: 72
End: 2022-08-12
Payer: MEDICARE

## 2022-08-12 ENCOUNTER — HOME CARE VISIT (OUTPATIENT)
Dept: HOSPICE | Facility: HOSPICE | Age: 72
End: 2022-08-12
Payer: MEDICARE

## 2022-08-12 VITALS
HEART RATE: 66 BPM | TEMPERATURE: 97.5 F | OXYGEN SATURATION: 98 % | SYSTOLIC BLOOD PRESSURE: 108 MMHG | DIASTOLIC BLOOD PRESSURE: 70 MMHG | RESPIRATION RATE: 16 BRPM

## 2022-08-12 PROCEDURE — 1090000002 HH PPS REVENUE DEBIT

## 2022-08-12 PROCEDURE — 0651 HSPC ROUTINE HOME CARE

## 2022-08-12 PROCEDURE — G0299 HHS/HOSPICE OF RN EA 15 MIN: HCPCS

## 2022-08-12 PROCEDURE — G0155 HHCP-SVS OF CSW,EA 15 MIN: HCPCS

## 2022-08-12 PROCEDURE — 1090000001 HH PPS REVENUE CREDIT

## 2022-08-12 ASSESSMENT — ENCOUNTER SYMPTOMS: STOOL DESCRIPTION: SOFT FORMED

## 2022-08-12 NOTE — HOSPICE
Patient was sitting in living room in recliner, alert and awake. She was having no complaints or signs of pain or distress. Writer drained 750 from pleurex drain of pink tinged fluid. Patient tolerated well. Dressing was changed, sit is free from signs of infection. No concerns or issues. BP post draining was 100/58.

## 2022-08-13 PROCEDURE — 1090000002 HH PPS REVENUE DEBIT

## 2022-08-13 PROCEDURE — 0651 HSPC ROUTINE HOME CARE

## 2022-08-13 PROCEDURE — 1090000001 HH PPS REVENUE CREDIT

## 2022-08-13 ASSESSMENT — ENCOUNTER SYMPTOMS: HEMOPTYSIS: 0

## 2022-08-13 NOTE — HOSPICE
Mary Null is a 66year oldcaucasian female admitted to [facility] with a diagnosis of liver failure. She is  and her only son  of liver failure within the last couple of years. She lived in [place] for the most part. Prior to coming to [place], she lived with a sister in [place] after the son . She has been staying with Martha Sandhu now for about a year. Mary Null is pleasant and interactive but struggles to maintain on subject. She did say she was an  for her career and said she enjoyed that position. They have the sister in [place] who id expected to visit in the next few days. Roxana Encarnacion and Martha Sandhu are the [facility]. IVONNE reviewed resources with Katherine for some respite for her. Mary Null finances are adequate for her needs.  They are of the [employer]

## 2022-08-14 PROCEDURE — 1090000002 HH PPS REVENUE DEBIT

## 2022-08-14 PROCEDURE — 0651 HSPC ROUTINE HOME CARE

## 2022-08-14 PROCEDURE — 1090000001 HH PPS REVENUE CREDIT

## 2022-08-15 ENCOUNTER — HOME CARE VISIT (OUTPATIENT)
Dept: SCHEDULING | Facility: HOME HEALTH | Age: 72
End: 2022-08-15
Payer: MEDICARE

## 2022-08-15 VITALS
RESPIRATION RATE: 16 BRPM | SYSTOLIC BLOOD PRESSURE: 105 MMHG | TEMPERATURE: 98.5 F | DIASTOLIC BLOOD PRESSURE: 73 MMHG | HEART RATE: 67 BPM

## 2022-08-15 PROCEDURE — G0299 HHS/HOSPICE OF RN EA 15 MIN: HCPCS

## 2022-08-15 PROCEDURE — 0651 HSPC ROUTINE HOME CARE

## 2022-08-15 NOTE — HOSPICE
RN arrived to home to find patient sitting up in the living room in her recliner. She lives with her sister. She is alert and oriented to person, but has significant confusion   She is pleasant and calm with assessment   She denies pain at this time and states itching has improved a lot  Sister states appetite is very good. She is eating 3 meals a day and constantly snacking. Her abdominal drain is intact and patent. She tolerated draining very well and without discomfort. Skin remains intact. She is unable to remember her last BM. She is ambulatory without DME and goes to the bathroom on her own at this time. Gait is fairly steady. She states she sleeps well  No new needs verb. at this time  RN will return Wednesday to drain again. MWF draining schedule. Sister verb. understanding to call [facility] with any new needs, changes or concerns.

## 2022-08-16 ENCOUNTER — HOME CARE VISIT (OUTPATIENT)
Dept: SCHEDULING | Facility: HOME HEALTH | Age: 72
End: 2022-08-16
Payer: MEDICARE

## 2022-08-16 PROCEDURE — 0651 HSPC ROUTINE HOME CARE

## 2022-08-16 PROCEDURE — G0299 HHS/HOSPICE OF RN EA 15 MIN: HCPCS

## 2022-08-17 ENCOUNTER — HOME CARE VISIT (OUTPATIENT)
Dept: SCHEDULING | Facility: HOME HEALTH | Age: 72
End: 2022-08-17
Payer: MEDICARE

## 2022-08-17 ENCOUNTER — HOME CARE VISIT (OUTPATIENT)
Dept: HOSPICE | Facility: HOSPICE | Age: 72
End: 2022-08-17
Payer: MEDICARE

## 2022-08-17 VITALS
OXYGEN SATURATION: 95 % | SYSTOLIC BLOOD PRESSURE: 104 MMHG | TEMPERATURE: 97.7 F | RESPIRATION RATE: 16 BRPM | DIASTOLIC BLOOD PRESSURE: 69 MMHG | HEART RATE: 66 BPM

## 2022-08-17 VITALS
RESPIRATION RATE: 16 BRPM | HEART RATE: 60 BPM | SYSTOLIC BLOOD PRESSURE: 106 MMHG | TEMPERATURE: 99.5 F | DIASTOLIC BLOOD PRESSURE: 60 MMHG

## 2022-08-17 PROCEDURE — 0651 HSPC ROUTINE HOME CARE

## 2022-08-17 PROCEDURE — 2500000001 HSPC NON INJECTABLE MED

## 2022-08-17 PROCEDURE — G0299 HHS/HOSPICE OF RN EA 15 MIN: HCPCS

## 2022-08-17 ASSESSMENT — ENCOUNTER SYMPTOMS
PAIN LOCATION - PAIN QUALITY: BITING
STOOL DESCRIPTION: SOFT FORMED
CONSTIPATION: 1

## 2022-08-17 NOTE — HOSPICE
RN made 2nd visit this week to assist MD with his initial visit. Pt. is very confused today and unable to identify objects or answer simple math/spelling questions  She denies pain currently and sister states she rarely has pain   Her blood sugar was HI today and she is non compliant with eating habits  MD made adjustments to her insulin. New order on MAR. She has been having urinary frequency per sister. MD ordered UA C&S to be obtained at tomorrow's visit. MD also decreased her draining schedule from 3 x weekly to 2 x weekly   She will be drained tomorrow and then again Monday 8/22/22  No other changes made at this time.    Family remains in agreement with poc  Understanding verb. to call [facility] with any new needs, changes or concerns

## 2022-08-17 NOTE — HOSPICE
Writer arrived and patient was awake and sitting in her recliner chair. She was in the same clothing and sister reported she has been refusing to shower or change clothes and has been having urine and bowel accidents over the past two days which are new. Patient was more confused and memory was poor with immediate conversation, as well as she was unable to follow simple directions this visit which is new. She has been more tired per sister, and was falling asleep on and off during visit today. She has some noted garbled speech, nonsensical words as well. Writer drained 850cc of pink colored fluid from pleurex drain but needed to stop draining at one point due to patient yelling out in pain. Writer had sister administer pain medication per MD order, had patient rest for a few minutes and then continued. Patient then tolerated it better. She described the pain as \"biting\". Writer attempted to obtain a UA as MD ordered but was unsuccessful as patient was unable to follow directions with writer assisting. Sister and patient refused a straight catheterization, so writer did not attempt. By end of visit patient was reclined in chair, with heating pad to abdomen, she appeared with no further signs of pain or distress and reported pain was 2/10. Writer instructed sister to call with any further concerns and discussed medications with her. Writer updated Dr. Scott Finley of findings of visit and further instructions were provided, including antibiotics if sister in agreement. Writer called patient's sister and she is in agreement with Dr. Adore Rader suggestions of Keflex 500mg PO TID x 8 days, and CBC and BMP to be obtained on Friday.  Arthur Quintanilla was updated on this as well.

## 2022-08-18 ENCOUNTER — HOSPITAL ENCOUNTER (OUTPATIENT)
Dept: LAB | Age: 72
Discharge: HOME | End: 2022-08-21
Payer: COMMERCIAL

## 2022-08-18 LAB
ANION GAP SERPL CALC-SCNC: 8 MMOL/L (ref 7–16)
BASOPHILS # BLD: 0 K/UL (ref 0–0.2)
BASOPHILS NFR BLD: 1 % (ref 0–2)
BUN SERPL-MCNC: 45 MG/DL (ref 8–23)
CALCIUM SERPL-MCNC: 8.4 MG/DL (ref 8.3–10.4)
CHLORIDE SERPL-SCNC: 99 MMOL/L (ref 98–107)
CO2 SERPL-SCNC: 28 MMOL/L (ref 21–32)
CREAT SERPL-MCNC: 1 MG/DL (ref 0.6–1)
DIFFERENTIAL METHOD BLD: ABNORMAL
EOSINOPHIL # BLD: 0.1 K/UL (ref 0–0.8)
EOSINOPHIL NFR BLD: 2 % (ref 0.5–7.8)
ERYTHROCYTE [DISTWIDTH] IN BLOOD BY AUTOMATED COUNT: 15.5 % (ref 11.9–14.6)
GLUCOSE SERPL-MCNC: 214 MG/DL (ref 65–100)
HCT VFR BLD AUTO: 33.1 % (ref 35.8–46.3)
HGB BLD-MCNC: 10.9 G/DL (ref 11.7–15.4)
IMM GRANULOCYTES # BLD AUTO: 0 K/UL (ref 0–0.5)
IMM GRANULOCYTES NFR BLD AUTO: 1 % (ref 0–5)
LYMPHOCYTES # BLD: 0.7 K/UL (ref 0.5–4.6)
LYMPHOCYTES NFR BLD: 15 % (ref 13–44)
MCH RBC QN AUTO: 27.7 PG (ref 26.1–32.9)
MCHC RBC AUTO-ENTMCNC: 32.9 G/DL (ref 31.4–35)
MCV RBC AUTO: 84.2 FL (ref 79.6–97.8)
MONOCYTES # BLD: 0.5 K/UL (ref 0.1–1.3)
MONOCYTES NFR BLD: 12 % (ref 4–12)
NEUTS SEG # BLD: 3.1 K/UL (ref 1.7–8.2)
NEUTS SEG NFR BLD: 69 % (ref 43–78)
NRBC # BLD: 0 K/UL (ref 0–0.2)
PLATELET # BLD AUTO: 174 K/UL (ref 150–450)
PMV BLD AUTO: 11.3 FL (ref 9.4–12.3)
POTASSIUM SERPL-SCNC: 4.1 MMOL/L (ref 3.5–5.1)
RBC # BLD AUTO: 3.93 M/UL (ref 4.05–5.2)
SODIUM SERPL-SCNC: 135 MMOL/L (ref 136–145)
WBC # BLD AUTO: 4.3 K/UL (ref 4.3–11.1)

## 2022-08-18 PROCEDURE — 85025 COMPLETE CBC W/AUTO DIFF WBC: CPT

## 2022-08-18 PROCEDURE — 0651 HSPC ROUTINE HOME CARE

## 2022-08-18 PROCEDURE — 80048 BASIC METABOLIC PNL TOTAL CA: CPT

## 2022-08-19 PROCEDURE — 0651 HSPC ROUTINE HOME CARE

## 2022-08-20 PROCEDURE — 0651 HSPC ROUTINE HOME CARE

## 2022-08-21 PROCEDURE — 0651 HSPC ROUTINE HOME CARE

## 2022-08-22 ENCOUNTER — HOME CARE VISIT (OUTPATIENT)
Dept: SCHEDULING | Facility: HOME HEALTH | Age: 72
End: 2022-08-22
Payer: MEDICARE

## 2022-08-22 VITALS
TEMPERATURE: 98.7 F | SYSTOLIC BLOOD PRESSURE: 100 MMHG | RESPIRATION RATE: 16 BRPM | DIASTOLIC BLOOD PRESSURE: 58 MMHG | HEART RATE: 64 BPM

## 2022-08-22 PROCEDURE — 0651 HSPC ROUTINE HOME CARE

## 2022-08-22 PROCEDURE — G0299 HHS/HOSPICE OF RN EA 15 MIN: HCPCS

## 2022-08-22 ASSESSMENT — ENCOUNTER SYMPTOMS
PAIN LOCATION - PAIN QUALITY: SHARP
STOOL DESCRIPTION: SOFT FORMED

## 2022-08-22 NOTE — HOSPICE
RN arrived to home to find patient sitting up in her recliner in living room   She appears very alert today upon first look  She was able to identify \"watch\" when RN pointed to it and asked what it is  She was alejandro to respond to RN questions with a small delay, but much clearer than last week  She has been on keflex since last week for possible UTI and she seems to be better   Sister said she has had a few moments of bad confusion, but overall she sees improvement as well  She went to the toilet and put the lid down before urinating and urinated all over the seat/floor. This happened over the weekend. This morning her sister found her pull up in the toilet. Sister reports her appetite has improved. Her bowels are moving well again also. She had one this a.m. She denies any pain or discomfort at this time  RN drained her today and got 800ml pink tinged fluid. She tolerated very well and without pain or discomfort. Her skin remains intact. Her BS was 155 this morning and sister reports not having to give 4th dose of insulin since order written. They have remained well below 300. No new needs verb. at this time  Sister remains in agreement with poc  Understanding verb. to call [facility] with any new needs, changes or concerns.

## 2022-08-23 PROCEDURE — 0651 HSPC ROUTINE HOME CARE

## 2022-08-24 ENCOUNTER — HOME CARE VISIT (OUTPATIENT)
Dept: SCHEDULING | Facility: HOME HEALTH | Age: 72
End: 2022-08-24
Payer: MEDICARE

## 2022-08-24 PROBLEM — Z51.5 HOSPICE CARE: Status: ACTIVE | Noted: 2022-01-01

## 2022-08-24 PROBLEM — D50.0 BLOOD LOSS ANEMIA: Status: ACTIVE | Noted: 2022-08-24

## 2022-08-24 PROBLEM — E60 ZINC DEFICIENCY: Status: ACTIVE | Noted: 2021-03-12

## 2022-08-24 PROBLEM — D50.9 IRON DEFICIENCY ANEMIA: Status: ACTIVE | Noted: 2021-03-12

## 2022-08-24 PROBLEM — K20.90 ESOPHAGITIS: Status: ACTIVE | Noted: 2022-08-24

## 2022-08-24 PROBLEM — K75.81 NASH (NONALCOHOLIC STEATOHEPATITIS): Status: ACTIVE | Noted: 2021-04-26

## 2022-08-24 PROBLEM — E11.65 HYPERGLYCEMIA DUE TO DIABETES MELLITUS (HCC): Status: ACTIVE | Noted: 2021-04-25

## 2022-08-24 PROBLEM — G43.719 INTRACTABLE CHRONIC MIGRAINE WITHOUT AURA AND WITHOUT STATUS MIGRAINOSUS: Status: ACTIVE | Noted: 2018-06-30

## 2022-08-24 PROCEDURE — G0156 HHCP-SVS OF AIDE,EA 15 MIN: HCPCS

## 2022-08-24 PROCEDURE — 0651 HSPC ROUTINE HOME CARE

## 2022-08-25 ENCOUNTER — HOME CARE VISIT (OUTPATIENT)
Dept: SCHEDULING | Facility: HOME HEALTH | Age: 72
End: 2022-08-25
Payer: MEDICARE

## 2022-08-25 VITALS — SYSTOLIC BLOOD PRESSURE: 110 MMHG | DIASTOLIC BLOOD PRESSURE: 58 MMHG

## 2022-08-25 PROCEDURE — G0299 HHS/HOSPICE OF RN EA 15 MIN: HCPCS

## 2022-08-25 PROCEDURE — 2500000001 HSPC NON INJECTABLE MED

## 2022-08-25 PROCEDURE — 0651 HSPC ROUTINE HOME CARE

## 2022-08-25 NOTE — HOSPICE
RN 2nd weekly visit to assess/drain patient's pleurx drain. She is very alert today and the clearest I've seen her. She was able to answer all questions appropriately and sister reports less confusion. Her BP today is 110/58. RN drained 850ml pink tinged fluid from pleurx. She tolerated procedure very well and without discomfort. New dressing applied. Sister requested refills today on Iron and Lexapro. Refills sent to [facility]. No other need voiced at this time. Sister verb. understanding to call [facility] with any further needs, changes or concerns.

## 2022-08-26 PROCEDURE — 0651 HSPC ROUTINE HOME CARE

## 2022-08-27 PROCEDURE — 0651 HSPC ROUTINE HOME CARE

## 2022-08-28 PROCEDURE — 0651 HSPC ROUTINE HOME CARE

## 2022-08-29 ENCOUNTER — HOME CARE VISIT (OUTPATIENT)
Dept: HOSPICE | Facility: HOSPICE | Age: 72
End: 2022-08-29
Payer: MEDICARE

## 2022-08-29 ENCOUNTER — HOME CARE VISIT (OUTPATIENT)
Dept: SCHEDULING | Facility: HOME HEALTH | Age: 72
End: 2022-08-29
Payer: MEDICARE

## 2022-08-29 VITALS
HEART RATE: 65 BPM | DIASTOLIC BLOOD PRESSURE: 58 MMHG | TEMPERATURE: 97.9 F | SYSTOLIC BLOOD PRESSURE: 102 MMHG | RESPIRATION RATE: 16 BRPM

## 2022-08-29 PROCEDURE — G0299 HHS/HOSPICE OF RN EA 15 MIN: HCPCS

## 2022-08-29 PROCEDURE — 0651 HSPC ROUTINE HOME CARE

## 2022-08-29 ASSESSMENT — ENCOUNTER SYMPTOMS
PAIN LOCATION - PAIN QUALITY: ACHE
STOOL DESCRIPTION: SOFT FORMED

## 2022-08-30 VITALS — RESPIRATION RATE: 16 BRPM | SYSTOLIC BLOOD PRESSURE: 120 MMHG | HEART RATE: 72 BPM | DIASTOLIC BLOOD PRESSURE: 70 MMHG

## 2022-08-30 PROCEDURE — 0651 HSPC ROUTINE HOME CARE

## 2022-08-30 ASSESSMENT — ENCOUNTER SYMPTOMS: PAIN LOCATION - PAIN QUALITY: SHARP, STABBING

## 2022-08-30 NOTE — HOSPICE
PRN visit; pts sister called and said pt more confused post abdominal pleurex  drain. She thinks pt is wet and pt is uncooperative. Instructed her to give pt Ativan 0.5mg now and RN on way. Sister said pt wont take her Ativan tab. Pt up in recliner chair, calm, she followed commands quite well; some confusion, she got up from her recliner with assist and walked almost unassisted to bathroom. Voided good amounts. Assisted back to chair, Ativan 0.5 mg po given with applesauce and taken well. Abdomen grossly distended, left foot +2 non pitting edema, warm. Sole of left foot cold, pink color. Pt appears to have polydipsia, having great thirst and drinking sprite as if she is extremely thirsty. Sister said pt drinks 6 bottles of sprite a day. Blood sugar 309. Insulin given. Pain across abdomen and lower back relieved with oxycodone. VS within normal range. Instructed on fall prevention.

## 2022-08-31 ENCOUNTER — HOME CARE VISIT (OUTPATIENT)
Dept: SCHEDULING | Facility: HOME HEALTH | Age: 72
End: 2022-08-31
Payer: MEDICARE

## 2022-08-31 ENCOUNTER — HOME CARE VISIT (OUTPATIENT)
Dept: HOSPICE | Facility: HOSPICE | Age: 72
End: 2022-08-31
Payer: MEDICARE

## 2022-08-31 PROCEDURE — G0156 HHCP-SVS OF AIDE,EA 15 MIN: HCPCS

## 2022-08-31 PROCEDURE — G0155 HHCP-SVS OF CSW,EA 15 MIN: HCPCS

## 2022-08-31 PROCEDURE — 0651 HSPC ROUTINE HOME CARE

## 2022-09-01 ENCOUNTER — HOME CARE VISIT (OUTPATIENT)
Dept: SCHEDULING | Facility: HOME HEALTH | Age: 72
End: 2022-09-01
Payer: MEDICARE

## 2022-09-01 PROCEDURE — 0651 HSPC ROUTINE HOME CARE

## 2022-09-01 PROCEDURE — G0299 HHS/HOSPICE OF RN EA 15 MIN: HCPCS

## 2022-09-02 VITALS — SYSTOLIC BLOOD PRESSURE: 104 MMHG | DIASTOLIC BLOOD PRESSURE: 60 MMHG

## 2022-09-02 PROCEDURE — 0651 HSPC ROUTINE HOME CARE

## 2022-09-02 ASSESSMENT — ENCOUNTER SYMPTOMS: HEMOPTYSIS: 0

## 2022-09-03 PROCEDURE — 0651 HSPC ROUTINE HOME CARE

## 2022-09-03 NOTE — HOSPICE
Second RN visit this week for scheduled pleurx draining  Pt. tolerated procedure well. 900 ml fluid return. Pt. denies pain currently. She has taken pain meds a few times this week for abdominal pain and lower back pain at times. No other needs verb. at this time. Family will call [facility] with any needs or concerns.

## 2022-09-04 PROCEDURE — 0651 HSPC ROUTINE HOME CARE

## 2022-09-05 ENCOUNTER — HOME CARE VISIT (OUTPATIENT)
Dept: SCHEDULING | Facility: HOME HEALTH | Age: 72
End: 2022-09-05
Payer: MEDICARE

## 2022-09-05 VITALS
SYSTOLIC BLOOD PRESSURE: 102 MMHG | DIASTOLIC BLOOD PRESSURE: 60 MMHG | HEART RATE: 74 BPM | TEMPERATURE: 97.5 F | RESPIRATION RATE: 18 BRPM

## 2022-09-05 PROCEDURE — G0299 HHS/HOSPICE OF RN EA 15 MIN: HCPCS

## 2022-09-05 PROCEDURE — 0651 HSPC ROUTINE HOME CARE

## 2022-09-05 ASSESSMENT — ENCOUNTER SYMPTOMS: HEMOPTYSIS: 0

## 2022-09-05 NOTE — HOSPICE
Routine Visit. Pt Sitting in recliner awake. Pt is Ambulatory with assistance. Pt's family denies any pt falls. Instructed pts cg on falls preventions. Pts cg verbalized understanding to teaching. Pts  sister  is  present. Pt is a&ox2 but forgetful with some confusion. pt appears sluggish with delayed responses. Pt has no Skin breakdown. Pt has no Sob today. Pt has abdominal edema. pt also has slight right ankle edema today. sn drained pts abdominal pleurx. sn drained 900 ml and it stopped. pt tolerated procedure well. Pt is having regular Bms. Pts Appetite is fair per sister. Pts cg denies any pt Nausea / vomiting. Pt is urinating well several times daily but pt is forgetting how to use bsc or she is removing her pullup and not putting another one on. Pt denies any Pain today. Pt has no s&s of pain today. Sn reviewed pts meds. pill box is filled for the next 2 weeks. no meds needed for refill today. Ordered supplies: ordered 10 pleurx bottles. SN updated mar & reconciled meds. sn updated care plan. Instructed pts cg to call [facility] with any questions or concerns. Pts cg verbalized understanding and agreement.

## 2022-09-06 PROCEDURE — 0651 HSPC ROUTINE HOME CARE

## 2022-09-07 ENCOUNTER — HOME CARE VISIT (OUTPATIENT)
Dept: SCHEDULING | Facility: HOME HEALTH | Age: 72
End: 2022-09-07
Payer: MEDICARE

## 2022-09-07 PROCEDURE — G0156 HHCP-SVS OF AIDE,EA 15 MIN: HCPCS

## 2022-09-07 PROCEDURE — 0651 HSPC ROUTINE HOME CARE

## 2022-09-07 RX ORDER — FUROSEMIDE 40 MG/1
40 TABLET ORAL DAILY
COMMUNITY
End: 2022-09-07

## 2022-09-08 ENCOUNTER — HOME CARE VISIT (OUTPATIENT)
Dept: SCHEDULING | Facility: HOME HEALTH | Age: 72
End: 2022-09-08
Payer: MEDICARE

## 2022-09-08 VITALS
DIASTOLIC BLOOD PRESSURE: 60 MMHG | RESPIRATION RATE: 16 BRPM | TEMPERATURE: 97.6 F | HEART RATE: 72 BPM | SYSTOLIC BLOOD PRESSURE: 110 MMHG

## 2022-09-08 PROCEDURE — 0651 HSPC ROUTINE HOME CARE

## 2022-09-08 PROCEDURE — G0299 HHS/HOSPICE OF RN EA 15 MIN: HCPCS

## 2022-09-08 ASSESSMENT — ENCOUNTER SYMPTOMS: STOOL DESCRIPTION: SOFT FORMED

## 2022-09-08 NOTE — HOSPICE
RN arrived to home to find patient sittng up in her recliner napping. She is alert and oriented to person and place. She is forgetful. She does have some short term memory loss per sister over the past several months. She denies pain currently. She does have intermittent pain in abdomena and lower back. Oxycodone is effective in relieving the pain. She denies any dyspnea and shows no s/s of SOB. Sister states appetite has decreased some over this past week. Pt. is not snacking as much and doesn't eat quite as much at meal time. Her meals are prepared for her and set up for her to feed herself. She remains incontinent of bladder at times. Bowels are moving well. She remains ambulatory and walks without DME. No falls reported since last visit. Her skin is intact currently aside from pleurx drain site. She continues to have noted ascites and intermittent edema in BLE. RN drained her today and 800ml pink tinged drainage obtained. Pt. tolerated very well. No other needs verb. to RN at this time. Pt. and sister remain in agreement with current poc and daughter verb. understanding to call [facility] with any needs, changes or concerns.

## 2022-09-09 PROCEDURE — 0651 HSPC ROUTINE HOME CARE

## 2022-09-10 PROCEDURE — 0651 HSPC ROUTINE HOME CARE

## 2022-09-11 PROCEDURE — 0651 HSPC ROUTINE HOME CARE

## 2022-09-12 ENCOUNTER — HOME CARE VISIT (OUTPATIENT)
Dept: SCHEDULING | Facility: HOME HEALTH | Age: 72
End: 2022-09-12
Payer: MEDICARE

## 2022-09-12 VITALS
DIASTOLIC BLOOD PRESSURE: 62 MMHG | HEART RATE: 84 BPM | RESPIRATION RATE: 16 BRPM | TEMPERATURE: 98 F | SYSTOLIC BLOOD PRESSURE: 100 MMHG

## 2022-09-12 PROCEDURE — 0651 HSPC ROUTINE HOME CARE

## 2022-09-12 PROCEDURE — G0299 HHS/HOSPICE OF RN EA 15 MIN: HCPCS

## 2022-09-12 ASSESSMENT — ENCOUNTER SYMPTOMS
STOOL DESCRIPTION: SOFT FORMED
PAIN LOCATION - PAIN QUALITY: ACHE

## 2022-09-12 NOTE — HOSPICE
RN arrived to home to find patient sitting up in her recliner  Her sister, Laureen Resendiz, is present with her today   She is alert and oriented to person, place and situation   She smiles and is pleasant with RN visit/assessment   She denies pain and sister states no pain since last Thursday (post draining)  She denies any falls and her skin remains intact  RN drained pleurx drain without difficulty. 925ml pink tinged drainage returned. Pt. tolerated well and without pain. Sister states she has been skipping supper on occasion and not snacking much anymore. She also reports patient has been sleeping more. No other concerns verb. at this time  Pt. and sister remain in agreement with poc  Understanding verb. to call [facility] with any new needs, changes or concerns.

## 2022-09-13 PROCEDURE — 0651 HSPC ROUTINE HOME CARE

## 2022-09-14 ENCOUNTER — HOME CARE VISIT (OUTPATIENT)
Dept: SCHEDULING | Facility: HOME HEALTH | Age: 72
End: 2022-09-14
Payer: MEDICARE

## 2022-09-14 PROCEDURE — 0651 HSPC ROUTINE HOME CARE

## 2022-09-14 PROCEDURE — G0156 HHCP-SVS OF AIDE,EA 15 MIN: HCPCS

## 2022-09-15 ENCOUNTER — HOME CARE VISIT (OUTPATIENT)
Dept: SCHEDULING | Facility: HOME HEALTH | Age: 72
End: 2022-09-15
Payer: MEDICARE

## 2022-09-15 VITALS — DIASTOLIC BLOOD PRESSURE: 70 MMHG | SYSTOLIC BLOOD PRESSURE: 122 MMHG | RESPIRATION RATE: 16 BRPM

## 2022-09-15 PROCEDURE — 0651 HSPC ROUTINE HOME CARE

## 2022-09-15 PROCEDURE — G0299 HHS/HOSPICE OF RN EA 15 MIN: HCPCS

## 2022-09-15 PROCEDURE — 2500000001 HSPC NON INJECTABLE MED

## 2022-09-15 ASSESSMENT — ENCOUNTER SYMPTOMS: STOOL DESCRIPTION: FORMED

## 2022-09-15 NOTE — HOSPICE
2nd weekly RN visit for pleurx draining  Patient is sitting up in her recliner during visit today   Her sister is present with her. Sister states her confusion has been worse over the past few days and she has been having increased pain   She reports having to give her more pain medication, which may be leading to the increased confusion and drowsiness. She states \"fine\" when RN asks her how she is feeling. RN drained 925ml from pleurx drain and patient tolerated well. Afterwards, she asked for a pain pill for abdominal pain. Sister states it is always worse after draining, but she did not have any pain during the draining. She pulled her bandage off since last visit and sister had attempted to cover the site. Her skin is now reddened and has a few small blisters where tape was. Patient sitting comfortably in her recliner upon RN departure. No new needs verb. at this time. Sister remains in agreement with poc. Understanding verb. to call [facility] with any new needs, changes or concerns.

## 2022-09-16 PROCEDURE — 0651 HSPC ROUTINE HOME CARE

## 2022-09-17 ENCOUNTER — HOME CARE VISIT (OUTPATIENT)
Dept: SCHEDULING | Facility: HOME HEALTH | Age: 72
End: 2022-09-17
Payer: MEDICARE

## 2022-09-17 PROCEDURE — 0651 HSPC ROUTINE HOME CARE

## 2022-09-17 PROCEDURE — G0156 HHCP-SVS OF AIDE,EA 15 MIN: HCPCS

## 2022-09-18 PROCEDURE — 0651 HSPC ROUTINE HOME CARE

## 2022-09-19 ENCOUNTER — HOME CARE VISIT (OUTPATIENT)
Dept: SCHEDULING | Facility: HOME HEALTH | Age: 72
End: 2022-09-19
Payer: MEDICARE

## 2022-09-19 VITALS
HEART RATE: 84 BPM | DIASTOLIC BLOOD PRESSURE: 64 MMHG | RESPIRATION RATE: 16 BRPM | SYSTOLIC BLOOD PRESSURE: 122 MMHG | TEMPERATURE: 99 F

## 2022-09-19 PROCEDURE — 2500000001 HSPC NON INJECTABLE MED

## 2022-09-19 PROCEDURE — G0299 HHS/HOSPICE OF RN EA 15 MIN: HCPCS

## 2022-09-19 PROCEDURE — 0651 HSPC ROUTINE HOME CARE

## 2022-09-19 ASSESSMENT — ENCOUNTER SYMPTOMS: STOOL DESCRIPTION: SOFT FORMED

## 2022-09-19 NOTE — HOSPICE
RN arrived to home to find patient sitting up in her recliner in living room   She is alert and oriented to person and place. She is pleasantly confused. She responds to questions, but response times are delayed. Sister states she is less confused today than she has been over the past 3 days. This morning she was unable to walk and required wheelchiar, but she was able to get up and walk to bathrom during RN visit. She denies pain currently, but sister states she has been having intermittent pain in abdomen. Oxycodone is effective when taken. RN drained pleurx today. Patient tolerated well. 850ml pink tinged fluid returned. New dressing applied. Refils submitted for lexapro, lorazepam and Iron  No other needs verb. at this time  Pt. and sister remain in agreement with poc  Understanding verb. to call [facility] with any new needs, changes or concerns.

## 2022-09-20 PROCEDURE — 0651 HSPC ROUTINE HOME CARE

## 2022-09-21 ENCOUNTER — HOME CARE VISIT (OUTPATIENT)
Dept: SCHEDULING | Facility: HOME HEALTH | Age: 72
End: 2022-09-21
Payer: MEDICARE

## 2022-09-21 PROCEDURE — 0651 HSPC ROUTINE HOME CARE

## 2022-09-21 PROCEDURE — G0156 HHCP-SVS OF AIDE,EA 15 MIN: HCPCS

## 2022-09-22 ENCOUNTER — HOME CARE VISIT (OUTPATIENT)
Dept: HOSPICE | Facility: HOSPICE | Age: 72
End: 2022-09-22
Payer: MEDICARE

## 2022-09-22 ENCOUNTER — HOME CARE VISIT (OUTPATIENT)
Dept: SCHEDULING | Facility: HOME HEALTH | Age: 72
End: 2022-09-22
Payer: MEDICARE

## 2022-09-22 VITALS — DIASTOLIC BLOOD PRESSURE: 58 MMHG | SYSTOLIC BLOOD PRESSURE: 118 MMHG

## 2022-09-22 PROCEDURE — G0155 HHCP-SVS OF CSW,EA 15 MIN: HCPCS

## 2022-09-22 PROCEDURE — G0299 HHS/HOSPICE OF RN EA 15 MIN: HCPCS

## 2022-09-22 PROCEDURE — 0651 HSPC ROUTINE HOME CARE

## 2022-09-22 NOTE — HOSPICE
Patient is sitting up in her recliner upon RN arrival   She is asleep, but wakes easily when she hears RN speaking with her sister   When awake, she is alert and pleasant  She walks to the bathroom twice during visit   Her abdomen does not appear as distended today and she does not have any discomfort or dyspnea  Sister reports she is only drinking about 2-3 spirte per day rather than 6. This has gradually decreased over the past few weeks. RN spoke with NP and received order to skip today's draining and patient agreed. No new needs verb. at this time   Sister verb. understanding to call [facility] with any new needs, changes or concerns.

## 2022-09-23 PROCEDURE — 0651 HSPC ROUTINE HOME CARE

## 2022-09-24 ENCOUNTER — HOME CARE VISIT (OUTPATIENT)
Dept: SCHEDULING | Facility: HOME HEALTH | Age: 72
End: 2022-09-24
Payer: MEDICARE

## 2022-09-24 PROCEDURE — G0156 HHCP-SVS OF AIDE,EA 15 MIN: HCPCS

## 2022-09-24 PROCEDURE — 0651 HSPC ROUTINE HOME CARE

## 2022-09-24 ASSESSMENT — ENCOUNTER SYMPTOMS: HEMOPTYSIS: 0

## 2022-09-24 NOTE — HOSPICE
Katherine requested SW visit to discuss SNF as she has caregiver fatigue. She has utilized in home respite care but reports now that she does not feel she can continue to care for Aaliyah Lawson as she is 'physically and emotionally drained'. Her spouse Ramirez Golden is present. SW explained the SNF placement process and provided them with a list of SNF needs. SW to refer to local facilities first and will notify them of any offers. Gordon is napping during this visit.   She reportedly has more confusion and physical care demands

## 2022-09-25 PROCEDURE — 0651 HSPC ROUTINE HOME CARE

## 2022-09-26 ENCOUNTER — HOME CARE VISIT (OUTPATIENT)
Dept: SCHEDULING | Facility: HOME HEALTH | Age: 72
End: 2022-09-26
Payer: MEDICARE

## 2022-09-26 VITALS
OXYGEN SATURATION: 98 % | RESPIRATION RATE: 16 BRPM | HEART RATE: 76 BPM | DIASTOLIC BLOOD PRESSURE: 59 MMHG | SYSTOLIC BLOOD PRESSURE: 100 MMHG

## 2022-09-26 PROCEDURE — G0299 HHS/HOSPICE OF RN EA 15 MIN: HCPCS

## 2022-09-26 PROCEDURE — 0651 HSPC ROUTINE HOME CARE

## 2022-09-26 ASSESSMENT — ENCOUNTER SYMPTOMS
HEMOPTYSIS: 0
PAIN LOCATION - PAIN QUALITY: ACHING

## 2022-09-27 PROCEDURE — 0651 HSPC ROUTINE HOME CARE

## 2022-09-27 NOTE — HOSPICE
Mauro Genao is a 74year old female seen today to drain her abdomen per biliary tube with Pleurex system. Antonia Meza, sister, states Mauro Genao required pain medication at 9:30am & 1:30pm which is unususal.  After 900cc drained from abdomen Mauro Genao stated abdomen felt better. Mauro Genao also was noted to be itching skin of arms & abdomen. Atarax given by Antonia Meza. Mauro Genao is assisted by Antonia Meza during this visit to the bathroom . Mauro Genao will at times go to bathroom without asking for help and is also incontinent at times. Physical exam is without changes See ROS for details. Blood sugar today is 240 this am and 196 at [facility] mental status has changed with more confusion and more lethargy. Antonia Meza is working with Social Work Lina Sellers for placement but Mauro Genao is not aware. Antonia Meza denies need for medications or supplies.

## 2022-09-28 ENCOUNTER — HOME CARE VISIT (OUTPATIENT)
Dept: HOSPICE | Facility: HOSPICE | Age: 72
End: 2022-09-28
Payer: MEDICARE

## 2022-09-28 ENCOUNTER — HOME CARE VISIT (OUTPATIENT)
Dept: SCHEDULING | Facility: HOME HEALTH | Age: 72
End: 2022-09-28
Payer: MEDICARE

## 2022-09-28 PROCEDURE — 0651 HSPC ROUTINE HOME CARE

## 2022-09-28 PROCEDURE — G0156 HHCP-SVS OF AIDE,EA 15 MIN: HCPCS

## 2022-09-29 ENCOUNTER — HOME CARE VISIT (OUTPATIENT)
Dept: SCHEDULING | Facility: HOME HEALTH | Age: 72
End: 2022-09-29
Payer: MEDICARE

## 2022-09-29 PROCEDURE — G0299 HHS/HOSPICE OF RN EA 15 MIN: HCPCS

## 2022-09-29 PROCEDURE — 0651 HSPC ROUTINE HOME CARE

## 2022-09-29 ASSESSMENT — ENCOUNTER SYMPTOMS: HEMOPTYSIS: 0

## 2022-09-30 ENCOUNTER — HOME CARE VISIT (OUTPATIENT)
Dept: HOSPICE | Facility: HOSPICE | Age: 72
End: 2022-09-30
Payer: MEDICARE

## 2022-09-30 VITALS
RESPIRATION RATE: 16 BRPM | DIASTOLIC BLOOD PRESSURE: 59 MMHG | SYSTOLIC BLOOD PRESSURE: 97 MMHG | HEART RATE: 76 BPM | OXYGEN SATURATION: 99 % | TEMPERATURE: 98.5 F

## 2022-09-30 PROBLEM — K72.90 LIVER FAILURE WITHOUT HEPATIC COMA (HCC): Status: ACTIVE | Noted: 2022-09-30

## 2022-09-30 PROBLEM — N39.3 URINE, INCONTINENCE, STRESS FEMALE: Status: ACTIVE | Noted: 2018-07-31

## 2022-09-30 PROCEDURE — 0655 HSPC INPATIENT RESPITE

## 2022-09-30 PROCEDURE — G0155 HHCP-SVS OF CSW,EA 15 MIN: HCPCS

## 2022-09-30 ASSESSMENT — ENCOUNTER SYMPTOMS: STOOL DESCRIPTION: SMALL

## 2022-09-30 NOTE — HOSPICE
Farzad Lora is a 74year old female seen to drain her Asites via her biliary access. Her sister Maral Bartlett reports Dian Pal had extreme negative  behaviors yesterday and that she badly needs a respite from Dian Kae. She also called [facility]. Rehana Cobb is also seeking long term Placement. This SN informed [facility] has no empty beds. Maral Bartlett is disappointed but grateful Dian Pal is more calm and cooperative today. Sun Grier  will continue to find permanent placement for Dian Pal. Maral Bartlett was also concerned and called [facility] yesterday because of finding some bright red blood on a Chux after her daily bath. Exam revealed multiple hemmroids and small amount of brown stool. 900 cc of orange colored is ddrained from her biliary tube today. For other exam details see DAQUAN Maral Bartlett is encouraged to administer Oxycodone in order to prevent Alexa's extreme behavior. Dian Pal is unable to tolerate Lorazepam for agitation.

## 2022-10-01 ENCOUNTER — HOME CARE VISIT (OUTPATIENT)
Dept: SCHEDULING | Facility: HOME HEALTH | Age: 72
End: 2022-10-01
Payer: MEDICARE

## 2022-10-01 PROCEDURE — 0655 HSPC INPATIENT RESPITE

## 2022-10-02 PROCEDURE — 0655 HSPC INPATIENT RESPITE

## 2022-10-03 PROCEDURE — 0655 HSPC INPATIENT RESPITE

## 2022-10-03 ASSESSMENT — ENCOUNTER SYMPTOMS: HEMOPTYSIS: 0

## 2022-10-03 NOTE — HOSPICE
IVONNE had requested respite for ASAP as Eugenia expresses exhaution. She says that patient has been agitated with her and combative at times. SW requested for ASAP and today they are able to take patient. IVONNE met with Isai Cuello to reveiw what she needed to bring and to complete the financial forms. Nelida Cummins was restless. IVONNE reviewed respite stay as 5 nights. IVONNE will see her in [facility].   IVONNE then returned the signed documents to the [facility]

## 2022-10-04 ENCOUNTER — HOME CARE VISIT (OUTPATIENT)
Dept: SCHEDULING | Facility: HOME HEALTH | Age: 72
End: 2022-10-04
Payer: MEDICARE

## 2022-10-04 PROCEDURE — 0655 HSPC INPATIENT RESPITE

## 2022-10-05 ENCOUNTER — HOME CARE VISIT (OUTPATIENT)
Dept: SCHEDULING | Facility: HOME HEALTH | Age: 72
End: 2022-10-05
Payer: MEDICARE

## 2022-10-05 PROCEDURE — 0651 HSPC ROUTINE HOME CARE

## 2022-10-05 PROCEDURE — G0299 HHS/HOSPICE OF RN EA 15 MIN: HCPCS

## 2022-10-06 ENCOUNTER — HOME CARE VISIT (OUTPATIENT)
Dept: HOSPICE | Facility: HOSPICE | Age: 72
End: 2022-10-06
Payer: MEDICARE

## 2022-10-06 VITALS — DIASTOLIC BLOOD PRESSURE: 70 MMHG | RESPIRATION RATE: 20 BRPM | HEART RATE: 84 BPM | SYSTOLIC BLOOD PRESSURE: 110 MMHG

## 2022-10-06 PROCEDURE — G0155 HHCP-SVS OF CSW,EA 15 MIN: HCPCS

## 2022-10-06 PROCEDURE — 0651 HSPC ROUTINE HOME CARE

## 2022-10-06 ASSESSMENT — ENCOUNTER SYMPTOMS
HEMOPTYSIS: 0
BOWEL INCONTINENCE: 1

## 2022-10-06 NOTE — HOSPICE
A private room was secured at the [address] facility by patient's sister yesterday. Today she ttransferred to the facility via [facility] ambulance service, per Suly's request. RN completed the PPD reading and drained her abdomen. The hopsice supervisor called report to the facility. Justice Phillips was anxious and focused on her dog and the death of her son. Thomas Sun gave her an ativan during the visit. No other needs reported.

## 2022-10-07 ENCOUNTER — HOME CARE VISIT (OUTPATIENT)
Dept: SCHEDULING | Facility: HOME HEALTH | Age: 72
End: 2022-10-07
Payer: MEDICARE

## 2022-10-07 PROCEDURE — G0299 HHS/HOSPICE OF RN EA 15 MIN: HCPCS

## 2022-10-07 PROCEDURE — 0651 HSPC ROUTINE HOME CARE

## 2022-10-07 NOTE — HOSPICE
pt returned home from respite this afternoon. arrived at [facility] late this evening, met with pts sister Lissy. Pt was in bed, she was rousable, lethargic and confuse. she was in no acute distress. respiration normal, abdomen grossly distended; has pleurex drain in right side of abdomen with dressing in place. [facility] stated pt is due to be drained tomorrow. . Also ppd skin test 48 hour read. no evidence of redness nor induration seen in right forearm. pt was saturated with urine. she was cleaned, new brief worn, gown changed; assisted pt to reposition in bed. [facility] stated pt for transfer to [facility] post acute care tomorrow. 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 all meds reconciled.  pt has sufficient home meds, does not need refills. vss.

## 2022-10-08 VITALS — DIASTOLIC BLOOD PRESSURE: 63 MMHG | SYSTOLIC BLOOD PRESSURE: 119 MMHG | HEART RATE: 74 BPM | RESPIRATION RATE: 18 BRPM

## 2022-10-08 PROCEDURE — 0651 HSPC ROUTINE HOME CARE

## 2022-10-08 ASSESSMENT — ENCOUNTER SYMPTOMS: BOWEL INCONTINENCE: 1

## 2022-10-08 NOTE — HOSPICE
24 Hour facility admission visit/RNCM Initial Visit:   *Pt moved to this facility on Wednesday 10/6/22. This is a LTC/SNF facility and pt is in Room 20 on the Rehab unit. *Previous SN visit Nurse Notes reviewed      a 64yo diagnosed with Liver Failure        Pt's identity confirmed using unique identifiers: name and date of birth and pt ID'd by facility staff  /63 P74 [id number]      Pt is alert, fully oriented to self, pleasant, communicates with normal, comprehensible speech and follows commands. Pt is calm and pleasant this visit. No Anxiety/Agitation noted. Via numeric pain scale, pt voices 0/10 and via FACES =0. Pt further voices no pain but some level of being uncomfortable when her abdomen is full (ascites/abd girth =49\"). PRN pain medication is effective when taken. Pt has a pleurx drain in which the facility nurses will drain twice weekly. Pt is scheduled to be drained today, floor nurse reports draining 800ml of fluid. Heart sounds are of a regular rate and rhythm      A slight increase in respirations noted while pt is ambulating and resolved with rest. Lung sounds are clear throughout all lobes. Pt denies any voiding concerns. Bowel sounds present in all quads. BMs are reported less daily. Facility staff reports episodes of incontinence of bowel & bladder. Per nurse notes & facility staff, pt's appetite is fair, nutritional intake has decreased and fluid intake is inadequate. No reported nausea/vomiting. Wound care nurse making her rounds performed a full body audit and reports no active wounds. Pt has no skin concerns at this time. Pt is observed standing and ambulating approximately 10ft with gait noted to be slow and unsteady. Palliative Performance Scale (PPS) = 40%      Fall Risk: [facility]-10 Fall Risk Assessment =7  Fall safety/prevention strategies are discussed/reviewed with facility staff and an understanding is voiced.  It is desired that pt remain safe in the home setting. Pt is taking medications whole and as prescribed. Medications are managed by [facility] Acute nursing staff. All medications are present. [facility]) SN will review medications weekly. Allergies reviewed: No Known Allergies (NKA)      *Pt's sister Oliva Mcdonnell) contacted [phone]. Group conversation occurred with pt's sister, facility floor nurse and this nurse for Q&A and medication resolution. This nurse addresses all issues/concerns and answers all questions at best. At visit end, all parties voices having a mutual understanding. *Care plans requiring an update: Pleurex drain, Pain, Fall Risk & Medications. *This nurse will place an order for supplies: XXL pullups, XXL briefs, chux & wipes  *No DME request      *Contact information: [facility]) contact information provided to floor nurse Fiona Monet. Pt resides at [facility]LTC/SNF) in Room 20. Coordination/Collaboration of care with facility staff who is advised to contact hospice [phone] regarding any pt concerns, falls and/or any change in pt's condition. Facility staff voices an understanding regarding call hospice first, not 911.

## 2022-10-09 PROCEDURE — 0651 HSPC ROUTINE HOME CARE

## 2022-10-10 ENCOUNTER — HOME CARE VISIT (OUTPATIENT)
Dept: SCHEDULING | Facility: HOME HEALTH | Age: 72
End: 2022-10-10
Payer: MEDICARE

## 2022-10-10 PROCEDURE — 0651 HSPC ROUTINE HOME CARE

## 2022-10-10 PROCEDURE — G0156 HHCP-SVS OF AIDE,EA 15 MIN: HCPCS

## 2022-10-11 PROCEDURE — 0651 HSPC ROUTINE HOME CARE

## 2022-10-12 ENCOUNTER — HOME CARE VISIT (OUTPATIENT)
Dept: HOSPICE | Facility: HOSPICE | Age: 72
End: 2022-10-12
Payer: MEDICARE

## 2022-10-12 ENCOUNTER — HOME CARE VISIT (OUTPATIENT)
Dept: SCHEDULING | Facility: HOME HEALTH | Age: 72
End: 2022-10-12
Payer: MEDICARE

## 2022-10-12 VITALS — SYSTOLIC BLOOD PRESSURE: 125 MMHG | HEART RATE: 80 BPM | DIASTOLIC BLOOD PRESSURE: 69 MMHG | RESPIRATION RATE: 16 BRPM

## 2022-10-12 PROCEDURE — G0299 HHS/HOSPICE OF RN EA 15 MIN: HCPCS

## 2022-10-12 PROCEDURE — 0651 HSPC ROUTINE HOME CARE

## 2022-10-12 ASSESSMENT — ENCOUNTER SYMPTOMS
STOOL DESCRIPTION: LOOSE
BOWEL INCONTINENCE: 1

## 2022-10-13 ENCOUNTER — HOME CARE VISIT (OUTPATIENT)
Dept: SCHEDULING | Facility: HOME HEALTH | Age: 72
End: 2022-10-13
Payer: MEDICARE

## 2022-10-13 PROCEDURE — 0651 HSPC ROUTINE HOME CARE

## 2022-10-13 PROCEDURE — G0156 HHCP-SVS OF AIDE,EA 15 MIN: HCPCS

## 2022-10-14 PROCEDURE — 0651 HSPC ROUTINE HOME CARE

## 2022-10-15 PROCEDURE — 0651 HSPC ROUTINE HOME CARE

## 2022-10-16 PROCEDURE — 0651 HSPC ROUTINE HOME CARE

## 2022-10-17 ENCOUNTER — HOME CARE VISIT (OUTPATIENT)
Dept: SCHEDULING | Facility: HOME HEALTH | Age: 72
End: 2022-10-17
Payer: MEDICARE

## 2022-10-17 ENCOUNTER — HOME CARE VISIT (OUTPATIENT)
Dept: HOSPICE | Facility: HOSPICE | Age: 72
End: 2022-10-17
Payer: MEDICARE

## 2022-10-17 PROCEDURE — G0156 HHCP-SVS OF AIDE,EA 15 MIN: HCPCS

## 2022-10-17 PROCEDURE — 0651 HSPC ROUTINE HOME CARE

## 2022-10-17 NOTE — HOSPICE
a 75 yo diagnosed with End Stage Liver Failure        NEW orders from [facility] 10/12/22   1. Change bumetanide (BUMEX) 1 MG tablet to two times day   2. Discontinue ferrous sulfate (IRON 325) 325 (65 Fe) MG tablet   3. Discontinue insulin lispro, 1 Unit Dial, (HUMALOG/ADMELOG) 100 UNIT/ML SOPN   4. Discontinue magnesium oxide (MAG-OX) 400 MG tablet   5. Discontinue methocarbamol (ROBAXIN) 500 MG tablet   6. Check BS q12 hours prior to Lantus. HOLD Lantus for BS <160   7. Drain pleurx twice a week (Per facility nurse, pt is drained on Tuesday and Friday)        *Pt and sister Thereasa Franck) informed of new medication orders and agrees with current care plan. Pt's sister voices gratefulness to have [facility] on board to assist with the care of her sister. pulmonary artery

## 2022-10-18 ENCOUNTER — HOME CARE VISIT (OUTPATIENT)
Dept: SCHEDULING | Facility: HOME HEALTH | Age: 72
End: 2022-10-18
Payer: MEDICARE

## 2022-10-18 PROCEDURE — G0299 HHS/HOSPICE OF RN EA 15 MIN: HCPCS

## 2022-10-18 PROCEDURE — 0651 HSPC ROUTINE HOME CARE

## 2022-10-19 PROCEDURE — 0651 HSPC ROUTINE HOME CARE

## 2022-10-20 ENCOUNTER — HOME CARE VISIT (OUTPATIENT)
Dept: SCHEDULING | Facility: HOME HEALTH | Age: 72
End: 2022-10-20
Payer: MEDICARE

## 2022-10-20 ENCOUNTER — HOME CARE VISIT (OUTPATIENT)
Dept: HOSPICE | Facility: HOSPICE | Age: 72
End: 2022-10-20
Payer: MEDICARE

## 2022-10-20 PROCEDURE — 0651 HSPC ROUTINE HOME CARE

## 2022-10-21 PROCEDURE — 0651 HSPC ROUTINE HOME CARE

## 2022-10-22 PROCEDURE — 0651 HSPC ROUTINE HOME CARE

## 2022-10-23 VITALS — SYSTOLIC BLOOD PRESSURE: 114 MMHG | RESPIRATION RATE: 18 BRPM | HEART RATE: 83 BPM | DIASTOLIC BLOOD PRESSURE: 64 MMHG

## 2022-10-23 PROCEDURE — 0651 HSPC ROUTINE HOME CARE

## 2022-10-23 ASSESSMENT — ENCOUNTER SYMPTOMS
BOWEL INCONTINENCE: 1
STOOL DESCRIPTION: FORMED

## 2022-10-24 ENCOUNTER — HOME CARE VISIT (OUTPATIENT)
Dept: SCHEDULING | Facility: HOME HEALTH | Age: 72
End: 2022-10-24
Payer: MEDICARE

## 2022-10-24 PROCEDURE — G0156 HHCP-SVS OF AIDE,EA 15 MIN: HCPCS

## 2022-10-24 PROCEDURE — 0651 HSPC ROUTINE HOME CARE

## 2022-10-25 ENCOUNTER — HOME CARE VISIT (OUTPATIENT)
Dept: SCHEDULING | Facility: HOME HEALTH | Age: 72
End: 2022-10-25
Payer: MEDICARE

## 2022-10-25 VITALS — DIASTOLIC BLOOD PRESSURE: 66 MMHG | RESPIRATION RATE: 16 BRPM | SYSTOLIC BLOOD PRESSURE: 112 MMHG | HEART RATE: 84 BPM

## 2022-10-25 PROCEDURE — 0651 HSPC ROUTINE HOME CARE

## 2022-10-25 PROCEDURE — G0299 HHS/HOSPICE OF RN EA 15 MIN: HCPCS

## 2022-10-26 PROCEDURE — 0651 HSPC ROUTINE HOME CARE

## 2022-10-27 ENCOUNTER — HOME CARE VISIT (OUTPATIENT)
Dept: SCHEDULING | Facility: HOME HEALTH | Age: 72
End: 2022-10-27
Payer: MEDICARE

## 2022-10-27 PROCEDURE — G0156 HHCP-SVS OF AIDE,EA 15 MIN: HCPCS

## 2022-10-27 PROCEDURE — 0651 HSPC ROUTINE HOME CARE

## 2022-10-28 ENCOUNTER — HOME CARE VISIT (OUTPATIENT)
Dept: SCHEDULING | Facility: HOME HEALTH | Age: 72
End: 2022-10-28
Payer: MEDICARE

## 2022-10-28 PROCEDURE — 0651 HSPC ROUTINE HOME CARE

## 2022-10-29 ENCOUNTER — HOME CARE VISIT (OUTPATIENT)
Dept: SCHEDULING | Facility: HOME HEALTH | Age: 72
End: 2022-10-29
Payer: MEDICARE

## 2022-10-29 PROCEDURE — 0651 HSPC ROUTINE HOME CARE

## 2022-10-30 PROCEDURE — 0651 HSPC ROUTINE HOME CARE

## 2022-10-31 ENCOUNTER — HOME CARE VISIT (OUTPATIENT)
Dept: SCHEDULING | Facility: HOME HEALTH | Age: 72
End: 2022-10-31
Payer: MEDICARE

## 2022-10-31 PROCEDURE — G0299 HHS/HOSPICE OF RN EA 15 MIN: HCPCS

## 2022-10-31 PROCEDURE — 0651 HSPC ROUTINE HOME CARE

## 2022-10-31 PROCEDURE — G0156 HHCP-SVS OF AIDE,EA 15 MIN: HCPCS

## 2022-11-01 VITALS — SYSTOLIC BLOOD PRESSURE: 104 MMHG | DIASTOLIC BLOOD PRESSURE: 58 MMHG | HEART RATE: 86 BPM | RESPIRATION RATE: 16 BRPM

## 2022-11-01 PROCEDURE — 0651 HSPC ROUTINE HOME CARE

## 2022-11-01 ASSESSMENT — ENCOUNTER SYMPTOMS
BOWEL INCONTINENCE: 1
STOOL DESCRIPTION: FORMED

## 2022-11-02 ENCOUNTER — HOME CARE VISIT (OUTPATIENT)
Dept: HOSPICE | Facility: HOSPICE | Age: 72
End: 2022-11-02
Payer: MEDICARE

## 2022-11-02 PROCEDURE — G0155 HHCP-SVS OF CSW,EA 15 MIN: HCPCS

## 2022-11-02 PROCEDURE — 0651 HSPC ROUTINE HOME CARE

## 2022-11-03 ENCOUNTER — HOME CARE VISIT (OUTPATIENT)
Dept: SCHEDULING | Facility: HOME HEALTH | Age: 72
End: 2022-11-03
Payer: MEDICARE

## 2022-11-03 ENCOUNTER — HOME CARE VISIT (OUTPATIENT)
Dept: HOSPICE | Facility: HOSPICE | Age: 72
End: 2022-11-03
Payer: MEDICARE

## 2022-11-03 PROCEDURE — 0651 HSPC ROUTINE HOME CARE

## 2022-11-04 PROCEDURE — 0651 HSPC ROUTINE HOME CARE

## 2022-11-05 PROCEDURE — 0651 HSPC ROUTINE HOME CARE

## 2022-11-06 PROCEDURE — 0651 HSPC ROUTINE HOME CARE

## 2022-11-07 ENCOUNTER — HOME CARE VISIT (OUTPATIENT)
Dept: SCHEDULING | Facility: HOME HEALTH | Age: 72
End: 2022-11-07
Payer: MEDICARE

## 2022-11-07 PROCEDURE — G0299 HHS/HOSPICE OF RN EA 15 MIN: HCPCS

## 2022-11-07 PROCEDURE — G0156 HHCP-SVS OF AIDE,EA 15 MIN: HCPCS

## 2022-11-07 PROCEDURE — 0651 HSPC ROUTINE HOME CARE

## 2022-11-08 PROCEDURE — 0651 HSPC ROUTINE HOME CARE

## 2022-11-09 ENCOUNTER — HOME CARE VISIT (OUTPATIENT)
Dept: SCHEDULING | Facility: HOME HEALTH | Age: 72
End: 2022-11-09
Payer: MEDICARE

## 2022-11-09 VITALS — RESPIRATION RATE: 16 BRPM | DIASTOLIC BLOOD PRESSURE: 63 MMHG | SYSTOLIC BLOOD PRESSURE: 117 MMHG | HEART RATE: 86 BPM

## 2022-11-09 PROCEDURE — 0651 HSPC ROUTINE HOME CARE

## 2022-11-09 ASSESSMENT — ENCOUNTER SYMPTOMS
HEMOPTYSIS: 0
STOOL DESCRIPTION: FORMED
BOWEL INCONTINENCE: 1

## 2022-11-09 NOTE — HOSPICE
Phillip Proctor is lying in her hospital bed watching TV. She is alert and recognized SW. She reports having adjusted more to her new surroundings. Phillip Proctor has intermitted confusion. She did some life review and was appropriate. SW provided emotional support and active listening.  Her sister visits regularly

## 2022-11-09 NOTE — HOSPICE
a 64yo diagnosed with Liver Failure     On arrival. pt is lying in bed with eyes open and watching television. Pt responds to verbal stimuli via acknowledging this nurse. Pt isalert, fully oriented to self, pleasant, communicates with normal, comprehensible speech and follows commands. Pt is calm and pleasant this visit, no Anxiety/Agitation noted. Via numeric pain scale, pt voices 0/10 and via FACES =0. Pt further voices no pain but some level of being uncomfortable when her abdomen is full (ascites/abd girth =49\"). Pt is assisted with repositioning. A slight increase in respirations noted with exertion/activity. Pt rports PRN pain medication is effective when taken. Pt has a pleurx drain in which the facility nurses will drain twice weekly (Tues/Friday). Floor nurse reports 800ml is usually the amount drained. Fall safety/prevention strategies are discussed/reviewed with facility staff and an understanding is voiced. It is desired that pt remain safe in the home setting. Pt is taking medications whole and as prescribed. Medications are managed by [facility] nursing staff. All medications are present. [facility]) SN will review medications weekly. Pt resides at [facility]/S[address]) in Room 20. Coordination/Collaboration of care with facility staff who is advised to contact hospice [phone] regarding any pt concerns, falls and/or any change in pt's condition. Facility staff voices an understanding regarding call hospice first, not 911.

## 2022-11-10 ENCOUNTER — HOME CARE VISIT (OUTPATIENT)
Dept: SCHEDULING | Facility: HOME HEALTH | Age: 72
End: 2022-11-10
Payer: MEDICARE

## 2022-11-10 PROCEDURE — G0156 HHCP-SVS OF AIDE,EA 15 MIN: HCPCS

## 2022-11-10 ASSESSMENT — ENCOUNTER SYMPTOMS
STOOL DESCRIPTION: FORMED
BOWEL INCONTINENCE: 1

## 2022-11-14 ENCOUNTER — HOME CARE VISIT (OUTPATIENT)
Dept: SCHEDULING | Facility: HOME HEALTH | Age: 72
End: 2022-11-14
Payer: MEDICARE

## 2022-11-14 PROCEDURE — G0156 HHCP-SVS OF AIDE,EA 15 MIN: HCPCS

## 2022-11-16 ENCOUNTER — HOME CARE VISIT (OUTPATIENT)
Dept: SCHEDULING | Facility: HOME HEALTH | Age: 72
End: 2022-11-16
Payer: MEDICARE

## 2022-11-16 PROCEDURE — G0299 HHS/HOSPICE OF RN EA 15 MIN: HCPCS

## 2022-11-17 ENCOUNTER — HOME CARE VISIT (OUTPATIENT)
Dept: SCHEDULING | Facility: HOME HEALTH | Age: 72
End: 2022-11-17
Payer: MEDICARE

## 2022-11-17 PROCEDURE — G0156 HHCP-SVS OF AIDE,EA 15 MIN: HCPCS

## 2022-11-21 ENCOUNTER — HOME CARE VISIT (OUTPATIENT)
Dept: SCHEDULING | Facility: HOME HEALTH | Age: 72
End: 2022-11-21
Payer: MEDICARE

## 2022-11-21 VITALS — RESPIRATION RATE: 16 BRPM | SYSTOLIC BLOOD PRESSURE: 118 MMHG | HEART RATE: 84 BPM | DIASTOLIC BLOOD PRESSURE: 64 MMHG

## 2022-11-21 PROCEDURE — G0156 HHCP-SVS OF AIDE,EA 15 MIN: HCPCS

## 2022-11-21 ASSESSMENT — ENCOUNTER SYMPTOMS
STOOL DESCRIPTION: FORMED
BOWEL INCONTINENCE: 1

## 2022-11-21 NOTE — HOSPICE
a 64yo diagnosed with Liver Failure            Pt is alert, fully oriented to self, pleasant, communicates with normal, comprehensible speech and follows commands. Pt is calm and pleasant this visit. No anxiety or agitation noted. Some confusion noted during conversation. Pt has a pleurx drain in which the facility nurses will drain twice weekly. Pt is scheduled to be drained today, floor nurse reports draining 800ml of fluid with a red tint. Ascites/abd girth =49\". Pt's supper tray has delivered. Pt is assisted with repositioning. A slight increase in respirations noted with exertion/activity. Pt is noted taking a few \"nibbles\". Facility staff report pt's appetite as fair, but nutritional intake has decreased. No reported nausea/vomiting. Medications reviewed, all medications are present. [facility]) SN reviews medications weekly. Pt has a noted \"yellow\" tint to her skin this visit. Pt resides at [facility] ([address]). Coordination/Collaboration of care with facility staff who is advised to contact hospice [phone] regarding any pt concerns, falls and/or any change in pt's condition. Facility staff voices an understanding regarding call hospice first, not 911.

## 2022-11-22 ENCOUNTER — HOME CARE VISIT (OUTPATIENT)
Dept: SCHEDULING | Facility: HOME HEALTH | Age: 72
End: 2022-11-22
Payer: MEDICARE

## 2022-11-22 PROCEDURE — G0299 HHS/HOSPICE OF RN EA 15 MIN: HCPCS

## 2022-11-23 VITALS — RESPIRATION RATE: 16 BRPM | HEART RATE: 86 BPM | DIASTOLIC BLOOD PRESSURE: 68 MMHG | SYSTOLIC BLOOD PRESSURE: 120 MMHG

## 2022-11-23 ASSESSMENT — ENCOUNTER SYMPTOMS
STOOL DESCRIPTION: FORMED
BOWEL INCONTINENCE: 1

## 2022-11-24 ENCOUNTER — HOME CARE VISIT (OUTPATIENT)
Dept: HOSPICE | Facility: HOSPICE | Age: 72
End: 2022-11-24
Payer: MEDICARE

## 2022-11-28 ENCOUNTER — HOME CARE VISIT (OUTPATIENT)
Dept: SCHEDULING | Facility: HOME HEALTH | Age: 72
End: 2022-11-28
Payer: MEDICARE

## 2022-11-28 VITALS — HEART RATE: 85 BPM | DIASTOLIC BLOOD PRESSURE: 56 MMHG | SYSTOLIC BLOOD PRESSURE: 111 MMHG

## 2022-11-28 PROCEDURE — G0156 HHCP-SVS OF AIDE,EA 15 MIN: HCPCS

## 2022-11-28 PROCEDURE — G0299 HHS/HOSPICE OF RN EA 15 MIN: HCPCS

## 2022-11-28 ASSESSMENT — ENCOUNTER SYMPTOMS
STOOL DESCRIPTION: FORMED
BOWEL INCONTINENCE: 1

## 2022-11-29 NOTE — HOSPICE
a 64yo diagnosed with Liver Failure   On arrival. pt is lying in bed with eyes closed but responds timely to verbal stimuli and acknowledge this nurse. Pt is semi-alert, fully oriented to self, pleasant, communicates with normal, comprehensible speech and follows commands. Pt is calm and reports tiredness. Via numeric pain scale, pt voices 0/10 and via FACES =0. Pt further voices no pain but some level of being uncomfortable when her abdomen is full (ascites/abd girth =49\"). Pt has a pleurx drain in which the facility nurses will drain twice weekly (Tues/Friday) and PRN. Floor nurse reports 800ml is still the usual the amount drained. Supper time is approaching and pt is assisted with repositioning. A slight increase in respirations noted with exertion/activity. All medications are present. [facility]) SN will review medications weekly. Pt resides at [facility] [address]. Coordination/Collaboration of care with facility staff who is advised to contact hospice [phone] regarding any pt concerns, falls and/or any change in pt's condition. Facility staff voices an understanding regarding call hospice first, not 911.

## 2022-11-30 ENCOUNTER — HOME CARE VISIT (OUTPATIENT)
Dept: HOSPICE | Facility: HOSPICE | Age: 72
End: 2022-11-30
Payer: MEDICARE

## 2022-11-30 PROCEDURE — G0299 HHS/HOSPICE OF RN EA 15 MIN: HCPCS

## 2022-12-01 ENCOUNTER — HOME CARE VISIT (OUTPATIENT)
Dept: SCHEDULING | Facility: HOME HEALTH | Age: 72
End: 2022-12-01
Payer: MEDICARE

## 2022-12-01 VITALS — HEART RATE: 78 BPM | RESPIRATION RATE: 16 BRPM | SYSTOLIC BLOOD PRESSURE: 111 MMHG | DIASTOLIC BLOOD PRESSURE: 60 MMHG

## 2022-12-01 PROCEDURE — G0156 HHCP-SVS OF AIDE,EA 15 MIN: HCPCS

## 2022-12-01 ASSESSMENT — ENCOUNTER SYMPTOMS
STOOL DESCRIPTION: FORMED
BOWEL INCONTINENCE: 1

## 2022-12-01 NOTE — HOSPICE
a 66yo with a hospice diagnosis of Liver failure without hepatic coma        Pt has an Indwelling Abdominal Pleurx Catheter and is usually drained on Tuesday, Friday and PRN with 800ml (output) being her normal.   Pt resides at [address] [place] [address] contacted hospice reporting that pt is not draining x2 attempts. This nurse made a visit and tag-teamed with a facility floor nurse. No draining (only noted drainage is fluid already present in the tubing which is pink-tinged). Pt denies any pain/discomfort. Pt's abdomen is NOT super distended and skin is not taunt as it is usually. VS: /60 P78 R16. Per TAYLOR Whitman, No new orders at this time. Attempt to drain on next scheduled day (Friday). Pt's sister Chris Doherty) contacted to discuss pt's current status.

## 2022-12-02 ENCOUNTER — HOME CARE VISIT (OUTPATIENT)
Dept: HOSPICE | Facility: HOSPICE | Age: 72
End: 2022-12-02
Payer: MEDICARE

## 2022-12-02 PROCEDURE — G0155 HHCP-SVS OF CSW,EA 15 MIN: HCPCS

## 2022-12-03 ENCOUNTER — HOME CARE VISIT (OUTPATIENT)
Dept: SCHEDULING | Facility: HOME HEALTH | Age: 72
End: 2022-12-03
Payer: MEDICARE

## 2022-12-03 ENCOUNTER — HOSPITAL ENCOUNTER (OUTPATIENT)
Dept: LAB | Age: 72
Discharge: HOME | End: 2022-12-06
Payer: MEDICARE

## 2022-12-03 LAB
AMORPH CRY URNS QL MICRO: ABNORMAL
APPEARANCE UR: ABNORMAL
BACTERIA URNS QL MICRO: ABNORMAL /HPF
BILIRUB UR QL: ABNORMAL
CASTS URNS QL MICRO: 0 /LPF
COLOR UR: ABNORMAL
CRYSTALS URNS QL MICRO: 0 /LPF
EPI CELLS #/AREA URNS HPF: ABNORMAL /HPF
GLUCOSE UR STRIP.AUTO-MCNC: NEGATIVE MG/DL
HGB UR QL STRIP: ABNORMAL
KETONES UR QL STRIP.AUTO: NEGATIVE MG/DL
LEUKOCYTE ESTERASE UR QL STRIP.AUTO: ABNORMAL
MUCOUS THREADS URNS QL MICRO: 0 /LPF
NITRITE UR QL STRIP.AUTO: NEGATIVE
OTHER OBSERVATIONS: ABNORMAL
PH UR STRIP: 6 [PH] (ref 5–9)
PROT UR STRIP-MCNC: NEGATIVE MG/DL
RBC #/AREA URNS HPF: ABNORMAL /HPF
SP GR UR REFRACTOMETRY: 1.02 (ref 1–1.02)
UROBILINOGEN UR QL STRIP.AUTO: >8 EU/DL (ref 0.2–1)
WBC URNS QL MICRO: >100 /HPF
YEAST URNS QL MICRO: ABNORMAL

## 2022-12-03 PROCEDURE — 87186 SC STD MICRODIL/AGAR DIL: CPT

## 2022-12-03 PROCEDURE — 81001 URINALYSIS AUTO W/SCOPE: CPT

## 2022-12-03 PROCEDURE — 87088 URINE BACTERIA CULTURE: CPT

## 2022-12-03 PROCEDURE — 87086 URINE CULTURE/COLONY COUNT: CPT

## 2022-12-03 ASSESSMENT — ENCOUNTER SYMPTOMS: HEMOPTYSIS: 0

## 2022-12-05 ENCOUNTER — HOME CARE VISIT (OUTPATIENT)
Dept: SCHEDULING | Facility: HOME HEALTH | Age: 72
End: 2022-12-05
Payer: MEDICARE

## 2022-12-05 LAB
BACTERIA SPEC CULT: ABNORMAL
BACTERIA SPEC CULT: ABNORMAL
SERVICE CMNT-IMP: ABNORMAL

## 2022-12-05 PROCEDURE — G0299 HHS/HOSPICE OF RN EA 15 MIN: HCPCS

## 2022-12-05 PROCEDURE — G0156 HHCP-SVS OF AIDE,EA 15 MIN: HCPCS

## 2022-12-05 NOTE — HOSPICE
a 64yo diagnosed with Liver Failure     Again, pt is in her usual location/position. Pt is lying in bed with eyes intermittently open/close attempting to watch television. Pt responds timely to verbal stimuli via acknowledging this nurse. Pt is semi-alert, fully oriented to self, pleasant, communicates with normal, comprehensible speech and follows commands. Pt is calm this visit, no Anxiety/Agitation noted. Abdomen girth =49\"(ascites). Pt is assisted with repositioning. A slight increase in respirations noted with exertion/activity. Pt seems to be losing interest in food. Appetite intact /nutrutional intake is poor. Pt has a pleurx drain in which is drain twice weekly (Tues/Friday) and 800ml is usually the amount drained. Pt is ad kirti and ambulates without an assistive device (pt refuses to utilize an assistive device). Fall safety/prevention discussions are ongoing with pt/facility staff. Medications reviewed. Pt resides at [facility]. Coordination/Collaboration of care with facility staff who is advised to contact hospice [phone] regarding any pt concerns, falls and/or any change in pt's condition.  Facility staff voices an understanding regarding call hospice first, not 911

## 2022-12-06 VITALS — DIASTOLIC BLOOD PRESSURE: 49 MMHG | HEART RATE: 79 BPM | SYSTOLIC BLOOD PRESSURE: 103 MMHG | RESPIRATION RATE: 16 BRPM

## 2022-12-06 ASSESSMENT — ENCOUNTER SYMPTOMS
STOOL DESCRIPTION: FORMED
BOWEL INCONTINENCE: 1

## 2022-12-07 ASSESSMENT — ENCOUNTER SYMPTOMS: HEMOPTYSIS: 0

## 2022-12-07 NOTE — HOSPICE
Adriel Moseley was talkative and fairly cheerful today. She is receptive to SW visit. She has difficulty getting her thoughts together but does eventually answer. She says her sister visits and brings her dog some. Adriel Moseley does mention her son and spouse sometimes. She acknowledged [place]. SW delivered a Leonore plant . SW provided emotional support and active listening.

## 2022-12-07 NOTE — HOSPICE
a 64yo diagnosed with Liver Failure           Pt has a fair appetite, continues to eat a regular diet but nutritional intake has decreased. Pt voices a desire to eat but is eating 50% or less of an adult plate. Pt's fluid intake is adequate, pt will ask for fluids. Weight-loss: Pt's face is noted to be slightly slender. MUAC: 23.5 cm to 24 cm    ADLs: Pt remains ad kitri, ambulates independently without an assistive device to and from toileting with gait noted to be slow paced and unsteady with lower extremity (LE) weakness. Pt has frequent episodes of incontinence of bowel and bladder (B&B). Pt has an Indwelling Abdominal Pleurx Catheter which is drained two times weekly. Since initial SN visit (10/7), pt's abdominal girth has been measuring 49. Pt tolerates being drained without difficulty (each drain is usually 800ml). In the past week, pt's Indwelling Abdominal Pleurx Catheter is no longer draining. A pt discussion occerred with the hospice MD. New medication order processed. Medication changes-   *spironolactone (ALDACTONE) 100 MG tablet Take 100 mg by mouth 2 times daily. Indications: Edema     *Nitrofurantoin Monohyd Macro (MACROBID PO) Take 50 mg by mouth 3 times daily. Indications: UTI     *fluconazole (DIFLUCAN) 150 MG tablet Take 150 mg by mouth once. Indications: Yeast (Repeat in ten (10) days). Falls: Pt does not utilize an assistive device when ambulating as refuses to do so. Pt's sister Forest Epps) reports while Deven Nick was living with her, she refused any type of assistive device (cane, walker). No reported fall since last SN visit. Change in Mental Status/LOC: Pt continues to present confusion and impulsive judgement. Each visit, pt displays drowsiness or is actually sleeping. Pt had a recent increase in her Lexapro as she reported feeling more depressed. Deven Nick has refused greater than three [facility] Aide visits this current certification.        Symptom management issues: Potential pain concerns since no longer draining. Pain is currently managed. *Abdominal girth has decreased and is now measuring 46.5.    *Pt recently had a positive urine test for an UTI and yeast.     Pt's sister Woodland Setter) contacted to discuss pt's current status. Pt resides at [address] [facility]. Coordination/Collaboration of pt's care implemented with facility staff who are advised to contact hospice [phone] regarding any pt concerns, fall(s) and/or any change in pt's condition. Facility staff voices an understanding regarding call hospice first, not 911.

## 2022-12-08 ENCOUNTER — HOME CARE VISIT (OUTPATIENT)
Dept: SCHEDULING | Facility: HOME HEALTH | Age: 72
End: 2022-12-08
Payer: MEDICARE

## 2022-12-08 PROCEDURE — G0156 HHCP-SVS OF AIDE,EA 15 MIN: HCPCS

## 2022-12-12 ENCOUNTER — HOME CARE VISIT (OUTPATIENT)
Dept: SCHEDULING | Facility: HOME HEALTH | Age: 72
End: 2022-12-12
Payer: MEDICARE

## 2022-12-12 PROCEDURE — G0156 HHCP-SVS OF AIDE,EA 15 MIN: HCPCS

## 2022-12-12 PROCEDURE — G0299 HHS/HOSPICE OF RN EA 15 MIN: HCPCS

## 2022-12-13 VITALS — SYSTOLIC BLOOD PRESSURE: 103 MMHG | HEART RATE: 78 BPM | DIASTOLIC BLOOD PRESSURE: 55 MMHG | RESPIRATION RATE: 16 BRPM

## 2022-12-13 ASSESSMENT — ENCOUNTER SYMPTOMS
STOOL DESCRIPTION: FORMED
BOWEL INCONTINENCE: 1

## 2022-12-13 NOTE — HOSPICE
*Pt has had no significant change(s) since last SN visit. a 66yo diagnosed with Liver Failure          Pt present tiredness, drowsiness and confusion. Pt was never fully alert this visit. Pt has an Indwelling Abdominal Pleurx Catheter which is no longer draining. Pt's abdomen is not distended. Pt denies any pain/discomfort. Facility staff reports pt is no longer making attempts to ambulate independently to and from toileting. Pt recently had a positive urine test for an UTI and yeast. Pt's current treatment is antibiotics and an antifungal.      Pt's sister Feroz Mohamud) contacted to discuss this SN visit. Pt resides at [address] [facility]. Coordination/Collaboration of pt's care implemented with facility staff who are advised to contact hospice [phone] regarding any pt concerns, fall(s) and/or any change in pt's condition. Facility staff voices an understanding regarding call hospice first, not 911.

## 2022-12-14 ENCOUNTER — HOME CARE VISIT (OUTPATIENT)
Dept: HOSPICE | Facility: HOSPICE | Age: 72
End: 2022-12-14
Payer: MEDICARE

## 2022-12-15 ENCOUNTER — HOME CARE VISIT (OUTPATIENT)
Dept: HOSPICE | Facility: HOSPICE | Age: 72
End: 2022-12-15
Payer: MEDICARE

## 2022-12-16 ENCOUNTER — HOME CARE VISIT (OUTPATIENT)
Dept: SCHEDULING | Facility: HOME HEALTH | Age: 72
End: 2022-12-16
Payer: MEDICARE

## 2022-12-16 PROCEDURE — G0299 HHS/HOSPICE OF RN EA 15 MIN: HCPCS

## 2022-12-17 VITALS — HEART RATE: 80 BPM | DIASTOLIC BLOOD PRESSURE: 58 MMHG | SYSTOLIC BLOOD PRESSURE: 106 MMHG | RESPIRATION RATE: 16 BRPM

## 2022-12-17 NOTE — HOSPICE
a 66yo diagnosed with Liver Failure     Pt present tiredness, drowsiness and confusion. Pt was never fully alert this visit, pt not following commands. No noted respiratory distress. Pt has an Indwelling Abdominal Pleurx Catheter which is no longer draining. Pt's abdomen is not distended. Pt denies any pain/discomfort. Facility staff reports pt is no longer making attempts to ambulate independently to and from toileting. Pt is incontinent of B&B. This nurse assisted with incontinence care. No skin integity concerns. Notable weight-loss, pt body frame appears thinner and abdomen is smaller (45\") despite pleurx no longer draining. Pt continues ABT for UTI, no noted adverse reactions. Pt currently is positive for COVID-19 and is quarantined. *Pt has had no significant change(s) since last SN visit. Pt resides at [address] [facility]. Coordination/Collaboration of pt's care implemented with facility staff who are advised to contact hospice [phone] regarding any pt concerns, fall(s) and/or any change in pt's condition. Facility staff voices an understanding regarding call hospice first, not 911.

## 2022-12-19 ENCOUNTER — HOME CARE VISIT (OUTPATIENT)
Dept: HOSPICE | Facility: HOSPICE | Age: 72
End: 2022-12-19
Payer: MEDICARE

## 2022-12-19 ENCOUNTER — HOME CARE VISIT (OUTPATIENT)
Dept: SCHEDULING | Facility: HOME HEALTH | Age: 72
End: 2022-12-19
Payer: MEDICARE

## 2022-12-19 PROCEDURE — G0299 HHS/HOSPICE OF RN EA 15 MIN: HCPCS

## 2022-12-21 VITALS
SYSTOLIC BLOOD PRESSURE: 120 MMHG | HEART RATE: 74 BPM | TEMPERATURE: 97.2 F | RESPIRATION RATE: 28 BRPM | DIASTOLIC BLOOD PRESSURE: 84 MMHG

## 2022-12-21 ASSESSMENT — ENCOUNTER SYMPTOMS
STOOL DESCRIPTION: FORMED
CONSTIPATION: 1
HEMOPTYSIS: 0

## 2022-12-22 ENCOUNTER — HOME CARE VISIT (OUTPATIENT)
Dept: SCHEDULING | Facility: HOME HEALTH | Age: 72
End: 2022-12-22
Payer: MEDICARE

## 2022-12-22 ENCOUNTER — HOME CARE VISIT (OUTPATIENT)
Dept: HOSPICE | Facility: HOSPICE | Age: 72
End: 2022-12-22
Payer: MEDICARE

## 2022-12-22 VITALS
DIASTOLIC BLOOD PRESSURE: 70 MMHG | TEMPERATURE: 97.8 F | SYSTOLIC BLOOD PRESSURE: 118 MMHG | RESPIRATION RATE: 16 BRPM | HEART RATE: 72 BPM

## 2022-12-22 PROCEDURE — G0155 HHCP-SVS OF CSW,EA 15 MIN: HCPCS

## 2022-12-22 PROCEDURE — G0299 HHS/HOSPICE OF RN EA 15 MIN: HCPCS

## 2022-12-22 PROCEDURE — G0156 HHCP-SVS OF AIDE,EA 15 MIN: HCPCS

## 2022-12-22 ASSESSMENT — ENCOUNTER SYMPTOMS
HEMOPTYSIS: 0
BOWEL INCONTINENCE: 1

## 2022-12-22 NOTE — HOSPICE
Routine Comprehensive Visit. pt is lying in hospital bed awake. pt is bedbound. facility nurse denies any pt falls. Instructed pts cg on falls preventions. Pts cg verbalized understanding to teaching. Pt is a&ox1. Pt has no Skin breakdown. Pt has no Sob today. Pt is having regular Bms. Pts Appetite is good per nurse. Pts cg denies any pt Nausea / vomiting. Pt is urinating well several times daily in her brief. Pt denies any Pain today. Pt has no s&s of pain today. Sn reviewed pts meds. Filled pts med-planner. dr Lowell Mtz discontinued pts scheduled pleurx draing this week in idg. sn instructed pts nurse on new orders   Pts cg verbalized understanding to teaching. Refilled meds: oxycodone. Zoar Helton np sent a script to facility [facility] to stop pleurx draining & oxy refill. Ordered supplies: none needed  SN updated mar & reconciled meds. sn updated care plan. Instructed pts cg to call [facility] with any questions or concerns. Pts cg verbalized understanding and agreement. sn called pts sister, rory to notify her that pt will come off of covid quarantine on saturday, dec 24.

## 2022-12-22 NOTE — HOSPICE
Leigh Ann Calvin is a 64yo diagnosed with Liver Failure   Pt present alert but looks very tiered. Pt has an Indwelling Abdominal Pleurx Catheter which is no longer draining. Pt's abdomen is not distended. Pt denies any pain/discomfort. Facility staff reports pt is not ambulating without help and rarely gets up at this time    Patient positive for COVID this week and on isolation in the facility but sister is allowed to visit with mask and gown. Patient has no symptoms of COVID infection. Afebrile, no cough. Pt resides at [address]. Coordination/Collaboration of pt's care implemented with facility staff who are advised to contact hospice [phone] regarding any pt concerns, fall(s) and/or any change in pt's condition. Facility staff voices an understanding regarding call hospice first, not 911.

## 2022-12-26 ENCOUNTER — HOME CARE VISIT (OUTPATIENT)
Dept: SCHEDULING | Facility: HOME HEALTH | Age: 72
End: 2022-12-26
Payer: MEDICARE

## 2022-12-27 ASSESSMENT — ENCOUNTER SYMPTOMS: HEMOPTYSIS: 0

## 2022-12-28 ENCOUNTER — HOME CARE VISIT (OUTPATIENT)
Dept: SCHEDULING | Facility: HOME HEALTH | Age: 72
End: 2022-12-28
Payer: MEDICARE

## 2022-12-28 PROCEDURE — G0299 HHS/HOSPICE OF RN EA 15 MIN: HCPCS

## 2022-12-29 ENCOUNTER — HOME CARE VISIT (OUTPATIENT)
Dept: SCHEDULING | Facility: HOME HEALTH | Age: 72
End: 2022-12-29
Payer: MEDICARE

## 2022-12-29 ENCOUNTER — HOME CARE VISIT (OUTPATIENT)
Dept: HOSPICE | Facility: HOSPICE | Age: 72
End: 2022-12-29
Payer: MEDICARE

## 2022-12-29 VITALS — SYSTOLIC BLOOD PRESSURE: 104 MMHG | RESPIRATION RATE: 16 BRPM | DIASTOLIC BLOOD PRESSURE: 51 MMHG | HEART RATE: 81 BPM

## 2022-12-29 PROCEDURE — G0156 HHCP-SVS OF AIDE,EA 15 MIN: HCPCS

## 2022-12-29 ASSESSMENT — ENCOUNTER SYMPTOMS
BOWEL INCONTINENCE: 1
STOOL DESCRIPTION: FORMED

## 2022-12-29 NOTE — HOSPICE
a 66yo with a hospice diagnosis of Liver Failure        Pt responds timely to verbal stimuli via semi alertness and able to state her full name. Pt presents confusion, garbled speech drowsiness and weakness. Pt was ambulating to and from the toilet for eliminating. Pt is no longer ambulatiing for toileting but is now completely incontinent. Pt seems to have taken to her bed. Change in Palliative Performance Scale (PPS) score now =30%. Pt is given water as her oral cavity appears dry. Pt too weak to hold her cup. Sister reports during her earlier visit she had to feed pt as she was unable to feed herself. Pt is displaying notable weight loss as evidence by a smaller body frame                                                                                                                                                                                                              Skin is intact. No skin integrity concerns at this time. Due to incontinence, the facility staff is encouraged to apply the provided skin protectant zinc-based ointment to the sacrum/perineal area after each incontinence episode. An extensive conversation with pt's sister Thereasa Franck) regarding pt's current status. *Sister also voices concern regarding receiving text messages from other family members who desires to take pt from this facility to care for her at their home. This nurse also received a call from the facility DON (who the sister also called regarding the previously mention concern). This nurse spoke with the NP who encourages that the MSW be made aware. This nurse will inform the MSW. Pt's abdomen is not distended. Abdominal girth has measures 46.5. Pt denies any pain/discomfort. *Per facility, pt's pleurx stopped draining. Order as of 12/21/22- Discontinue Pleurex draining. Call Provider if patient disteneded with pain.

## 2023-01-01 ENCOUNTER — HOME CARE VISIT (OUTPATIENT)
Dept: SCHEDULING | Facility: HOME HEALTH | Age: 73
End: 2023-01-01
Payer: MEDICARE

## 2023-01-01 ENCOUNTER — HOME CARE VISIT (OUTPATIENT)
Dept: HOSPICE | Facility: HOSPICE | Age: 73
End: 2023-01-01
Payer: MEDICARE

## 2023-01-01 PROCEDURE — G0299 HHS/HOSPICE OF RN EA 15 MIN: HCPCS

## 2023-01-01 PROCEDURE — G0155 HHCP-SVS OF CSW,EA 15 MIN: HCPCS

## 2023-01-01 ASSESSMENT — ENCOUNTER SYMPTOMS
HEMOPTYSIS: 0
HEMOPTYSIS: 0

## 2023-01-02 ENCOUNTER — HOME CARE VISIT (OUTPATIENT)
Dept: HOSPICE | Facility: HOSPICE | Age: 73
End: 2023-01-02
Payer: MEDICARE

## 2023-01-04 ENCOUNTER — HOME CARE VISIT (OUTPATIENT)
Dept: SCHEDULING | Facility: HOME HEALTH | Age: 73
End: 2023-01-04
Payer: MEDICARE

## 2023-01-04 PROCEDURE — G0299 HHS/HOSPICE OF RN EA 15 MIN: HCPCS

## 2023-01-05 ENCOUNTER — HOME CARE VISIT (OUTPATIENT)
Dept: SCHEDULING | Facility: HOME HEALTH | Age: 73
End: 2023-01-05
Payer: MEDICARE

## 2023-01-05 PROCEDURE — G0156 HHCP-SVS OF AIDE,EA 15 MIN: HCPCS

## 2023-01-09 ENCOUNTER — HOME CARE VISIT (OUTPATIENT)
Dept: SCHEDULING | Facility: HOME HEALTH | Age: 73
End: 2023-01-09
Payer: MEDICARE

## 2023-01-09 VITALS — DIASTOLIC BLOOD PRESSURE: 61 MMHG | HEART RATE: 85 BPM | RESPIRATION RATE: 16 BRPM | SYSTOLIC BLOOD PRESSURE: 104 MMHG

## 2023-01-09 PROCEDURE — G0156 HHCP-SVS OF AIDE,EA 15 MIN: HCPCS

## 2023-01-09 PROCEDURE — G0299 HHS/HOSPICE OF RN EA 15 MIN: HCPCS

## 2023-01-09 ASSESSMENT — ENCOUNTER SYMPTOMS: BOWEL INCONTINENCE: 1

## 2023-01-09 NOTE — HOSPICE
a 64yo with a hospice diagnosis of Liver Failure           Pt responds slowly to verbal stimuli via semi alertness. Pt presents lethargy, confusion, drowsiness and weakness. Speech is delayed and garbled. and able to state her full name. Pt presents confusion, garbled speech drowsiness and weakness. Pt is completely incontinent of Bowel & Bladder. Pt no longer ambulates to and from the toilet. Pt has taken to her bed. Palliative Performance Scale (PPS) score now =30%. Pt is given water as her oral cavity appears dry. Pt too weak to hold her cup and pt requires feeding. Pt is displaying notable weight loss as evidence by pt appears thinner and a smaller body frame is noted. Pt's abdomen is not distended. Skin is intact. No skin integrity concerns at this time. Due to incontinence, the facility staff is encouraged to apply the provided skin protectant zinc-based ointment to the sacrum/perineal area after each incontinence episode. Pt's sister/POA Shirlyn Primrose) is in agreement with the hospice MD to Discontinue the rifAXIMin Farrel Founds) tablet 550 mg (pt/family supplied) as this drug is no longer effective. UPDATE: *Per facility, pt's pleurx has stopped draining. Order as of 12/21/22- Discontinue Pleurx draining. Call Provider if patient distended with pain. Coordination/Collaboration of care with facility staff who is advised to contact hospice [phone] regarding any pt concerns, falls and/or any change in patient's condition. Facility staff voices an understanding regarding call hospice first, not 911.

## 2023-01-10 ENCOUNTER — HOME CARE VISIT (OUTPATIENT)
Dept: SCHEDULING | Facility: HOME HEALTH | Age: 73
End: 2023-01-10
Payer: MEDICARE

## 2023-01-12 ENCOUNTER — HOME CARE VISIT (OUTPATIENT)
Dept: SCHEDULING | Facility: HOME HEALTH | Age: 73
End: 2023-01-12
Payer: MEDICARE

## 2023-01-12 PROCEDURE — G0156 HHCP-SVS OF AIDE,EA 15 MIN: HCPCS

## 2023-01-13 ENCOUNTER — HOME CARE VISIT (OUTPATIENT)
Dept: SCHEDULING | Facility: HOME HEALTH | Age: 73
End: 2023-01-13
Payer: MEDICARE

## 2023-01-13 VITALS — HEART RATE: 82 BPM | SYSTOLIC BLOOD PRESSURE: 108 MMHG | DIASTOLIC BLOOD PRESSURE: 60 MMHG | RESPIRATION RATE: 16 BRPM

## 2023-01-13 PROCEDURE — G0299 HHS/HOSPICE OF RN EA 15 MIN: HCPCS

## 2023-01-13 ASSESSMENT — ENCOUNTER SYMPTOMS
BOWEL INCONTINENCE: 1
STOOL DESCRIPTION: FORMED
STOOL DESCRIPTION: FORMED
BOWEL INCONTINENCE: 1

## 2023-01-13 NOTE — HOSPICE
a 66yo with a hospice diagnosis of Liver Failure          Pt is oriented to person, responds slowly to verbal stimuli and displays drowsiness. Pt presents lethargy, confusion, drowsiness and weakness. Pt will follow commands if the commands are understood. Pt is displaying mental decline. Speech is delayed and garbled. and able to state her full name. Pt is completely incontinent of Bowel & Bladder. Pt no longer ambulates to and from the toilet. Pt has taken to her bed. Palliative Performance Scale (PPS) score now =30%. Pt is given water as her oral cavity appears dry. Pt too weak to hold her cup and pt requires feeding. Pt is displaying notable weight loss as evidence by pt appears thinner and a smaller body frame is noted. Pt's abdomen is not distended. Skin is intact. No skin integrity concerns at this time. Due to incontinence, the facility staff is encouraged to apply the provided skin protectant zinc-based ointment to the sacrum/perineal area after each incontinence episode. Medication review notes that pt is receiving pain medication roughly every 1-2 days. Pt does not appear to be in any pain/discomfort. Coordination/Collaboration of care with facility staff who is advised to contact hospice [phone] regarding any pt concerns, falls and/or any change in patient's condition. Facility staff voices an understanding regarding call hospice first, not 911.

## 2023-01-13 NOTE — HOSPICE
No significant change(s) since last SN visit. Pt continue to decline. a 64yo with a hospice diagnosis of Liver Failure          Pt is oriented to person, responds slowly to verbal stimuli and displays drowsiness. Pt presents lethargy, confusion, drowsiness and weakness. Pt will follow commands if the commands are understood. Pt is displaying mental decline. Speech is delayed and garbled. and able to state her full name. Pt is completely incontinent of Bowel & Bladder. Pt no longer ambulates to and from the toilet. Pt has taken to her bed. Palliative Performance Scale (PPS) score now =30%. Pt is given water as her oral cavity appears dry. Pt too weak to hold her cup and pt requires feeding. Pt is displaying notable weight loss as evidence by pt appears thinner and a smaller body frame is noted. Pt's abdomen is not distended. Skin is intact. No skin integrity concerns at this time. Due to incontinence, the facility staff is encouraged to apply the provided skin protectant zinc-based ointment to the sacrum/perineal area after each incontinence episode. Medication review notes that pt is receiving pain medication roughly every 1-2 days. Pt does not appear to be in any pain/discomfort. Coordination/Collaboration of care with facility staff who is advised to contact hospice [phone] regarding any pt concerns, falls and/or any change in patient's condition. Facility staff voices an understanding regarding call hospice first, not 911.

## 2023-01-16 ENCOUNTER — HOME CARE VISIT (OUTPATIENT)
Dept: SCHEDULING | Facility: HOME HEALTH | Age: 73
End: 2023-01-16
Payer: MEDICARE

## 2023-01-16 VITALS — SYSTOLIC BLOOD PRESSURE: 104 MMHG | HEART RATE: 72 BPM | DIASTOLIC BLOOD PRESSURE: 50 MMHG | RESPIRATION RATE: 16 BRPM

## 2023-01-16 PROCEDURE — G0156 HHCP-SVS OF AIDE,EA 15 MIN: HCPCS

## 2023-01-16 PROCEDURE — G0299 HHS/HOSPICE OF RN EA 15 MIN: HCPCS

## 2023-01-18 ENCOUNTER — HOME CARE VISIT (OUTPATIENT)
Dept: SCHEDULING | Facility: HOME HEALTH | Age: 73
End: 2023-01-18
Payer: MEDICARE

## 2023-01-19 ENCOUNTER — HOME CARE VISIT (OUTPATIENT)
Dept: SCHEDULING | Facility: HOME HEALTH | Age: 73
End: 2023-01-19
Payer: MEDICARE

## 2023-01-19 PROCEDURE — G0156 HHCP-SVS OF AIDE,EA 15 MIN: HCPCS

## 2023-01-20 ENCOUNTER — HOME CARE VISIT (OUTPATIENT)
Dept: SCHEDULING | Facility: HOME HEALTH | Age: 73
End: 2023-01-20
Payer: MEDICARE

## 2023-01-20 ENCOUNTER — HOME CARE VISIT (OUTPATIENT)
Dept: HOSPICE | Facility: HOSPICE | Age: 73
End: 2023-01-20
Payer: MEDICARE

## 2023-01-20 PROCEDURE — G0299 HHS/HOSPICE OF RN EA 15 MIN: HCPCS

## 2023-01-20 PROCEDURE — G0155 HHCP-SVS OF CSW,EA 15 MIN: HCPCS

## 2023-01-23 ENCOUNTER — HOME CARE VISIT (OUTPATIENT)
Dept: HOSPICE | Facility: HOSPICE | Age: 73
End: 2023-01-23
Payer: MEDICARE

## 2023-01-23 ENCOUNTER — HOME CARE VISIT (OUTPATIENT)
Dept: SCHEDULING | Facility: HOME HEALTH | Age: 73
End: 2023-01-23
Payer: MEDICARE

## 2023-01-23 VITALS — RESPIRATION RATE: 16 BRPM | DIASTOLIC BLOOD PRESSURE: 58 MMHG | HEART RATE: 76 BPM | SYSTOLIC BLOOD PRESSURE: 102 MMHG

## 2023-01-23 PROCEDURE — G0299 HHS/HOSPICE OF RN EA 15 MIN: HCPCS

## 2023-01-23 PROCEDURE — G0156 HHCP-SVS OF AIDE,EA 15 MIN: HCPCS

## 2023-01-23 ASSESSMENT — ENCOUNTER SYMPTOMS
BOWEL INCONTINENCE: 1
STOOL DESCRIPTION: FORMED
HEMOPTYSIS: 0

## 2023-01-23 NOTE — HOSPICE
a 66yo with a hospice diagnosis of Liver Failure          Pt's response to verbal/tactile stimuli is appropriate for her status. Pt is semi alert and presents confusion, drowsiness and weakness. Speech is delayed and slurred. Pt is completely incontinent of Bowel & Bladder. Pt no longer ambulates to and from the toilet. Pt has taken to her bed. Palliative Performance Scale (PPS) score now =30%. Pt is given water as her oral cavity appears dry, pt is too weak to hold her cup. Pt also requires feeding. Appetite fluctuates and nutritional intake is poor. Pt is displaying notable weight loss as evidence by pt appears thinner and a smaller body frame is noted. Pt's abdomen is not distended, but soft and non-tender. Skin is intact. No skin integrity concerns at this time. Due to incontinence, the facility staff is encouraged to apply the provided skin protectant zinc-based ointment to the sacrum/perineal area after each incontinence episode. Pt's sister/POLUCERO Velazquez) is contacted for a pt update. Coordination/Collaboration of care with facility staff who is advised to contact hospice [phone] regarding any pt concerns, falls and/or any change in patient's condition. Facility staff voices an understanding regarding call hospice first, not 911.

## 2023-01-23 NOTE — HOSPICE
Routine visit made to assess needs and offer emotional support. Met with pt and sister Johanna Re to discuss any needs or concerns. Pt in bed and able to answer simple questions. Sister states pt is more alert. Pt eating and drinking without any problems. Sister states she visits several times during the week to assist with any needs. No immediate concerns with pain or discomfort at this time. Spoke with facility staff regarding any needs or concerns. Overall,pt is managing at this time. Continue to coordinate care with faciity staff to meet pt/family needs.

## 2023-01-24 VITALS — HEART RATE: 75 BPM | DIASTOLIC BLOOD PRESSURE: 53 MMHG | RESPIRATION RATE: 16 BRPM | SYSTOLIC BLOOD PRESSURE: 102 MMHG

## 2023-01-24 ASSESSMENT — ENCOUNTER SYMPTOMS
STOOL DESCRIPTION: FORMED
BOWEL INCONTINENCE: 1

## 2023-01-24 NOTE — HOSPICE
a 64yo with a hospice diagnosis of Liver Failure              Pt is oriented to person, responds slowly to verbal stimuli and displays drowsiness. Pt presents lethargy, confusion, drowsiness and weakness. Pt will follow commands if the commands are understood. Pt is displaying mental and physical decline. Speech is delayed and garbled. Pt is given water as her oral cavity appears dry. Pt too weak to hold her cup and pt requires feeding. Pt is displaying notable weight loss as evidence by pt appears thinner and a smaller body frame is noted. Pt's abdomen is mildly distended. Pt is completely incontinent of Bowel & Bladder. Pt no longer ambulates to and from the toilet. Skin is intact. No skin integrity concerns at this time. Pt does have a yellow tint to her skin. Due to incontinence, the facility staff is encouraged to apply the provided skin protectant zinc-based ointment to the sacrum/perineal area after each incontinence episode. Medication review notes that pt is receiving pain medication roughly once daily every 1-2 days. Pt does not appear to be in any pain/discomfort. Pt has taken to her bed. Palliative Performance Scale (PPS) score now =30%. Coordination/Collaboration of care with facility staff who is advised to contact hospice [phone] regarding any pt concerns, falls and/or any change in patient's condition. Facility staff voices an understanding regarding call hospice first, not 911.

## 2023-01-25 ENCOUNTER — HOME CARE VISIT (OUTPATIENT)
Dept: SCHEDULING | Facility: HOME HEALTH | Age: 73
End: 2023-01-25
Payer: MEDICARE

## 2023-01-26 ENCOUNTER — HOME CARE VISIT (OUTPATIENT)
Dept: SCHEDULING | Facility: HOME HEALTH | Age: 73
End: 2023-01-26
Payer: MEDICARE

## 2023-01-26 PROCEDURE — G0156 HHCP-SVS OF AIDE,EA 15 MIN: HCPCS

## 2023-01-27 ENCOUNTER — HOME CARE VISIT (OUTPATIENT)
Dept: SCHEDULING | Facility: HOME HEALTH | Age: 73
End: 2023-01-27
Payer: MEDICARE

## 2023-01-27 PROCEDURE — G0299 HHS/HOSPICE OF RN EA 15 MIN: HCPCS

## 2023-01-30 ENCOUNTER — HOME CARE VISIT (OUTPATIENT)
Dept: SCHEDULING | Facility: HOME HEALTH | Age: 73
End: 2023-01-30
Payer: MEDICARE

## 2023-01-30 PROCEDURE — G0156 HHCP-SVS OF AIDE,EA 15 MIN: HCPCS

## 2023-01-30 ASSESSMENT — ENCOUNTER SYMPTOMS
STOOL DESCRIPTION: FORMED
CONSTIPATION: 1
TROUBLE SWALLOWING: 1

## 2023-01-31 ENCOUNTER — HOME CARE VISIT (OUTPATIENT)
Dept: SCHEDULING | Facility: HOME HEALTH | Age: 73
End: 2023-01-31
Payer: MEDICARE

## 2023-01-31 PROCEDURE — G0299 HHS/HOSPICE OF RN EA 15 MIN: HCPCS

## 2023-02-01 VITALS — RESPIRATION RATE: 16 BRPM | SYSTOLIC BLOOD PRESSURE: 128 MMHG | DIASTOLIC BLOOD PRESSURE: 76 MMHG | HEART RATE: 93 BPM

## 2023-02-02 ENCOUNTER — HOME CARE VISIT (OUTPATIENT)
Dept: SCHEDULING | Facility: HOME HEALTH | Age: 73
End: 2023-02-02
Payer: MEDICARE

## 2023-02-02 PROCEDURE — G0156 HHCP-SVS OF AIDE,EA 15 MIN: HCPCS

## 2023-02-03 ENCOUNTER — HOME CARE VISIT (OUTPATIENT)
Dept: SCHEDULING | Facility: HOME HEALTH | Age: 73
End: 2023-02-03
Payer: MEDICARE

## 2023-02-03 VITALS — DIASTOLIC BLOOD PRESSURE: 58 MMHG | RESPIRATION RATE: 16 BRPM | SYSTOLIC BLOOD PRESSURE: 100 MMHG | HEART RATE: 81 BPM

## 2023-02-03 PROCEDURE — G0299 HHS/HOSPICE OF RN EA 15 MIN: HCPCS

## 2023-02-06 ENCOUNTER — HOME CARE VISIT (OUTPATIENT)
Dept: SCHEDULING | Facility: HOME HEALTH | Age: 73
End: 2023-02-06
Payer: MEDICARE

## 2023-02-06 PROCEDURE — G0156 HHCP-SVS OF AIDE,EA 15 MIN: HCPCS

## 2023-02-06 ASSESSMENT — ENCOUNTER SYMPTOMS
STOOL DESCRIPTION: FORMED
BOWEL INCONTINENCE: 1
BOWEL INCONTINENCE: 1
STOOL DESCRIPTION: FORMED
STOOL DESCRIPTION: FORMED
BOWEL INCONTINENCE: 1

## 2023-02-07 ENCOUNTER — HOME CARE VISIT (OUTPATIENT)
Dept: SCHEDULING | Facility: HOME HEALTH | Age: 73
End: 2023-02-07
Payer: MEDICARE

## 2023-02-07 PROCEDURE — G0299 HHS/HOSPICE OF RN EA 15 MIN: HCPCS

## 2023-02-08 VITALS — SYSTOLIC BLOOD PRESSURE: 98 MMHG | RESPIRATION RATE: 16 BRPM | DIASTOLIC BLOOD PRESSURE: 57 MMHG | HEART RATE: 86 BPM

## 2023-02-09 ENCOUNTER — HOME CARE VISIT (OUTPATIENT)
Dept: SCHEDULING | Facility: HOME HEALTH | Age: 73
End: 2023-02-09
Payer: MEDICARE

## 2023-02-09 PROCEDURE — G0156 HHCP-SVS OF AIDE,EA 15 MIN: HCPCS

## 2023-02-10 ENCOUNTER — HOME CARE VISIT (OUTPATIENT)
Dept: SCHEDULING | Facility: HOME HEALTH | Age: 73
End: 2023-02-10
Payer: MEDICARE

## 2023-02-10 ENCOUNTER — HOME CARE VISIT (OUTPATIENT)
Dept: HOSPICE | Facility: HOSPICE | Age: 73
End: 2023-02-10
Payer: MEDICARE

## 2023-02-10 VITALS — HEART RATE: 83 BPM | SYSTOLIC BLOOD PRESSURE: 103 MMHG | DIASTOLIC BLOOD PRESSURE: 62 MMHG | RESPIRATION RATE: 16 BRPM

## 2023-02-10 PROCEDURE — G0299 HHS/HOSPICE OF RN EA 15 MIN: HCPCS

## 2023-02-12 ENCOUNTER — HOME CARE VISIT (OUTPATIENT)
Dept: HOSPICE | Facility: HOSPICE | Age: 73
End: 2023-02-12
Payer: MEDICARE

## 2023-02-12 VITALS — HEART RATE: 86 BPM | DIASTOLIC BLOOD PRESSURE: 76 MMHG | SYSTOLIC BLOOD PRESSURE: 118 MMHG | RESPIRATION RATE: 16 BRPM

## 2023-02-12 PROCEDURE — G0299 HHS/HOSPICE OF RN EA 15 MIN: HCPCS

## 2023-02-13 ENCOUNTER — HOME CARE VISIT (OUTPATIENT)
Dept: SCHEDULING | Facility: HOME HEALTH | Age: 73
End: 2023-02-13
Payer: MEDICARE

## 2023-02-13 PROCEDURE — G0156 HHCP-SVS OF AIDE,EA 15 MIN: HCPCS

## 2023-02-13 NOTE — HOSPICE
*Call received from the facility reporting Left hand is swollen, knuckles are red in color and a small blister is on the pinky knuckle. 73 yo Dx: Liver Failure       On arrival, pt is semi-alert and attempts to respond to verbal and tactile stimuli but is extremely drowsy. Left hand displays swelling and pt presents discomfort when handling left hand. Left hand is gently repositioned elevated on a pillow for greater than thirty minutes. [address] provider [facility]) contacted and discussed pt status. Provider in agreement with current intervention. No new orders. By visit end, the swelling has decreased significantly. There is coordination/collaboration of pt care with facility staff who are advised to contact hospice [phone] regarding any pt concerns, falls and/or any change in pt's condition. Call hospice first, not 911.

## 2023-02-14 ENCOUNTER — HOME CARE VISIT (OUTPATIENT)
Dept: SCHEDULING | Facility: HOME HEALTH | Age: 73
End: 2023-02-14
Payer: MEDICARE

## 2023-02-14 PROCEDURE — G0299 HHS/HOSPICE OF RN EA 15 MIN: HCPCS

## 2023-02-16 ENCOUNTER — HOME CARE VISIT (OUTPATIENT)
Dept: SCHEDULING | Facility: HOME HEALTH | Age: 73
End: 2023-02-16
Payer: MEDICARE

## 2023-02-16 PROCEDURE — G0156 HHCP-SVS OF AIDE,EA 15 MIN: HCPCS

## 2023-02-17 ENCOUNTER — HOME CARE VISIT (OUTPATIENT)
Dept: SCHEDULING | Facility: HOME HEALTH | Age: 73
End: 2023-02-17
Payer: MEDICARE

## 2023-02-17 PROCEDURE — G0299 HHS/HOSPICE OF RN EA 15 MIN: HCPCS

## 2023-02-20 ENCOUNTER — HOME CARE VISIT (OUTPATIENT)
Dept: SCHEDULING | Facility: HOME HEALTH | Age: 73
End: 2023-02-20
Payer: MEDICARE

## 2023-02-20 PROCEDURE — G0156 HHCP-SVS OF AIDE,EA 15 MIN: HCPCS

## 2023-02-20 ASSESSMENT — ENCOUNTER SYMPTOMS
BOWEL INCONTINENCE: 1
STOOL DESCRIPTION: FORMED

## 2023-02-21 ENCOUNTER — HOME CARE VISIT (OUTPATIENT)
Dept: HOSPICE | Facility: HOSPICE | Age: 73
End: 2023-02-21
Payer: MEDICARE

## 2023-02-21 ENCOUNTER — HOME CARE VISIT (OUTPATIENT)
Dept: SCHEDULING | Facility: HOME HEALTH | Age: 73
End: 2023-02-21
Payer: MEDICARE

## 2023-02-21 PROCEDURE — G0155 HHCP-SVS OF CSW,EA 15 MIN: HCPCS

## 2023-02-21 PROCEDURE — G0299 HHS/HOSPICE OF RN EA 15 MIN: HCPCS

## 2023-02-22 ASSESSMENT — ENCOUNTER SYMPTOMS: HEMOPTYSIS: 0

## 2023-02-22 NOTE — HOSPICE
Leigh Ann Calvin was lying on her hospital bed with her eyes closed upon SW arrive. SW stimulated her with speaking to her and asking questions. Leigh Ann Calvin did not open her eyes until half way through the visit. She would respond to sw with one word answers. She drank an entire vitamin water and some additional water during the visit. Her affect is flat. SW assisted the CNA with a brief change. There were no family members present. Her sister reports visiting a few times weekly with her dog. Leigh Ann Calvin seems withdrawn and does have decreased cognitive ability. SW to continue visits for support and socialization.   SW offered emotional support

## 2023-02-23 ENCOUNTER — HOME CARE VISIT (OUTPATIENT)
Dept: SCHEDULING | Facility: HOME HEALTH | Age: 73
End: 2023-02-23
Payer: MEDICARE

## 2023-02-23 VITALS — SYSTOLIC BLOOD PRESSURE: 114 MMHG | HEART RATE: 88 BPM | RESPIRATION RATE: 16 BRPM | DIASTOLIC BLOOD PRESSURE: 64 MMHG

## 2023-02-23 PROCEDURE — G0156 HHCP-SVS OF AIDE,EA 15 MIN: HCPCS

## 2023-02-23 ASSESSMENT — ENCOUNTER SYMPTOMS
BOWEL INCONTINENCE: 1
BOWEL INCONTINENCE: 1
STOOL DESCRIPTION: FORMED
BOWEL INCONTINENCE: 1

## 2023-02-23 NOTE — HOSPICE
Dr Prema Maria    a 66yo female with a hospice diagnosis of Liver Failure       On this visit, pt is sitting up in her hospital bed at a ninety- degree angle with eyes open and watching television. Pt responds timely to verbal/tactile stimuli and fully oriented to self. Pt appears dazed but once she process the question/command she is able to sensibly answer the MD's simple questions and is able to follow his command. Pt has not been fully alert for a couple weeks. MD assess pt's abdomen. Pt's pleurx catheter stopped functioning/draining in December 2022. Mild ascites noted to the abdomen. Pt displays no signs or symptoms of discomfort or distress and pt verbally denies pain. Per facility staff, pt's nutritional intake fluctuates, pt may eat but not enough to meet body requirements and when pt is awake she presents extreme thirst. Pt is a feeder as she can no longer feed herself. No reported nausea, vomiting or swallowing concerns. Pt is displaying notable weight loss as evidence by pt appears thinner and a smaller body frame is noted. Pt continues to have regular BMs, bedbound and at risk for skin breakdown. Skin is intact. No skin integrity concerns at this time. Pt is incontinent of Bowel & Bladder (B&B). The facility staff is encouraged to continue applying the provided skin protectant zinc-based ointment to the sacrum/perineal area after each incontinence episode and regular repositioning. Pt's sister Courtney Higgins) is contacted for an update. Coordination/Collaboration of care with facility staff who is advised to contact hospice [phone] regarding any pt concerns, falls and/or any change in patient's condition. Facility staff voices an understanding regarding call hospice first, not 911.

## 2023-02-24 ENCOUNTER — HOME CARE VISIT (OUTPATIENT)
Dept: SCHEDULING | Facility: HOME HEALTH | Age: 73
End: 2023-02-24
Payer: MEDICARE

## 2023-02-24 PROCEDURE — G0299 HHS/HOSPICE OF RN EA 15 MIN: HCPCS

## 2023-02-25 VITALS — DIASTOLIC BLOOD PRESSURE: 64 MMHG | RESPIRATION RATE: 16 BRPM | HEART RATE: 80 BPM | SYSTOLIC BLOOD PRESSURE: 110 MMHG

## 2023-02-25 ASSESSMENT — ENCOUNTER SYMPTOMS: STOOL DESCRIPTION: FORMED

## 2023-02-25 NOTE — HOSPICE
a 66yo female with a hospice diagnosis of Liver Failure         On this visit, pt is sitting up in her hospital bed at a seventy-five degree angle. Pt's eyes are intermittently open/closed. The TV is on but pt is not watching it. Pt responds timely to verbal/tactile stimuli via making eye contact and is non-verbal this visit. Pt appears dazed and is not processing simple commands. This nurse sits quietly at bedside. Pt is noted intermittently opening her eyes and scanning her surroundings before dozing off. Pt is allowed to rest    No new concerns since last SN visit    Coordination/Collaboration of care with facility staff who is advised to contact hospice [phone] regarding any pt concerns, falls and/or any change in patient's condition. Facility staff voices an understanding regarding call hospice first, not 911.

## 2023-02-27 ENCOUNTER — HOME CARE VISIT (OUTPATIENT)
Dept: SCHEDULING | Facility: HOME HEALTH | Age: 73
End: 2023-02-27
Payer: MEDICARE

## 2023-02-27 PROCEDURE — G0156 HHCP-SVS OF AIDE,EA 15 MIN: HCPCS

## 2023-02-28 ENCOUNTER — HOME CARE VISIT (OUTPATIENT)
Dept: SCHEDULING | Facility: HOME HEALTH | Age: 73
End: 2023-02-28
Payer: MEDICARE

## 2023-02-28 PROCEDURE — G0299 HHS/HOSPICE OF RN EA 15 MIN: HCPCS

## 2023-03-02 ENCOUNTER — HOME CARE VISIT (OUTPATIENT)
Dept: SCHEDULING | Facility: HOME HEALTH | Age: 73
End: 2023-03-02
Payer: MEDICARE

## 2023-03-02 PROCEDURE — G0156 HHCP-SVS OF AIDE,EA 15 MIN: HCPCS

## 2023-03-03 ENCOUNTER — HOME CARE VISIT (OUTPATIENT)
Dept: SCHEDULING | Facility: HOME HEALTH | Age: 73
End: 2023-03-03
Payer: MEDICARE

## 2023-03-03 PROCEDURE — G0299 HHS/HOSPICE OF RN EA 15 MIN: HCPCS

## 2023-03-06 ENCOUNTER — HOME CARE VISIT (OUTPATIENT)
Dept: SCHEDULING | Facility: HOME HEALTH | Age: 73
End: 2023-03-06
Payer: MEDICARE

## 2023-03-06 PROCEDURE — G0156 HHCP-SVS OF AIDE,EA 15 MIN: HCPCS

## 2023-03-06 PROCEDURE — G0299 HHS/HOSPICE OF RN EA 15 MIN: HCPCS

## 2023-03-09 ENCOUNTER — HOME CARE VISIT (OUTPATIENT)
Dept: SCHEDULING | Facility: HOME HEALTH | Age: 73
End: 2023-03-09
Payer: MEDICARE

## 2023-03-09 VITALS — HEART RATE: 82 BPM | SYSTOLIC BLOOD PRESSURE: 112 MMHG | RESPIRATION RATE: 16 BRPM | DIASTOLIC BLOOD PRESSURE: 66 MMHG

## 2023-03-09 PROCEDURE — G0156 HHCP-SVS OF AIDE,EA 15 MIN: HCPCS

## 2023-03-09 ASSESSMENT — ENCOUNTER SYMPTOMS
STOOL DESCRIPTION: FORMED
STOOL DESCRIPTION: FORMED
BOWEL INCONTINENCE: 1
BOWEL INCONTINENCE: 1

## 2023-03-09 NOTE — HOSPICE
a 66yo female with a hospice diagnosis of Liver Failure         Again, on arrival, pt is sitting up in her hospital bed at a ninety- degree angle alert, with eyes open, fully oriented to self and watching television. Pt's eyes are aimed at the TV but no real response the scenes. Pt responds timely to verbal/tactile stimuli via voicing a delayed hello. Once processed pt is able to sensibly answer questions and follow simple commands. Per facility staff, pt's nutritional intake fluctuates (pt does not always eat three meals daily), pt may eat but not enough to meet body requirements and when pt is awake she presents extreme thirst. Pt is a feeder as she can no longer feed herself. No reported nausea, vomiting or swallowing concerns. Pt is displaying notable weight loss as evidence by pt appears thinner and a smaller body frame is noted. Risk for skin breakdown. Pt is incontinent of Bowel & Bladder (B&B). The facility staff is encouraged to continue applying the provided skin protectant zinc-based ointment to the sacrum/perineal area after each incontinence episode and regular repositioning. Pt continues to have regular BMs, bedbound and at risk for skin breakdown. Pt is now displaying s/sx of skin breakdown. Air mattress is now in place. Focused skin breakdown prevention strategies are as follows: applying skin protectant after incontinent episodes, regular repositioning, nutrition and now an air mattress is in place. Pt's sister David Lennon) is contacted for an update. Coordination/Collaboration of care with facility staff who is advised to contact hospice [phone] regarding any pt concerns, falls and/or any change in patient's condition. Facility staff voices an understanding regarding call hospice first, not 911.

## 2023-03-13 ENCOUNTER — HOME CARE VISIT (OUTPATIENT)
Dept: SCHEDULING | Facility: HOME HEALTH | Age: 73
End: 2023-03-13
Payer: MEDICARE

## 2023-03-13 PROCEDURE — G0299 HHS/HOSPICE OF RN EA 15 MIN: HCPCS

## 2023-03-13 PROCEDURE — G0156 HHCP-SVS OF AIDE,EA 15 MIN: HCPCS

## 2023-03-14 VITALS — RESPIRATION RATE: 16 BRPM | SYSTOLIC BLOOD PRESSURE: 102 MMHG | HEART RATE: 74 BPM | DIASTOLIC BLOOD PRESSURE: 65 MMHG

## 2023-03-15 ENCOUNTER — HOME CARE VISIT (OUTPATIENT)
Dept: HOSPICE | Facility: HOSPICE | Age: 73
End: 2023-03-15
Payer: MEDICARE

## 2023-03-15 PROCEDURE — G0155 HHCP-SVS OF CSW,EA 15 MIN: HCPCS

## 2023-03-16 ENCOUNTER — HOME CARE VISIT (OUTPATIENT)
Dept: SCHEDULING | Facility: HOME HEALTH | Age: 73
End: 2023-03-16
Payer: MEDICARE

## 2023-03-16 PROCEDURE — G0156 HHCP-SVS OF AIDE,EA 15 MIN: HCPCS

## 2023-03-16 ASSESSMENT — ENCOUNTER SYMPTOMS: HEMOPTYSIS: 0

## 2023-03-16 NOTE — HOSPICE
Roy Councilman is lying in her hospital bed resting quietly. Sw opened her eyes and looked at SW and followed most of the visit. When asked if she wanrted something to drink, she said yes. SW cleaned her drinking bottle and replaced the water with fresh. She drank vigorously and got the hicups. She ate a couple of patatoe chips as well. She vomited later in the visit, seemingly from the volume of water she drank. SW cleaned her off and changed her linens. She was placed facing her right side. SW visited and offered emotional support. Roy Councilman remained interactive. IVONNE reported the above to the current facility CNF and her hospice nurse. Roy Councilman appeared comfortable at the end of the visit.

## 2023-03-17 ASSESSMENT — ENCOUNTER SYMPTOMS
STOOL DESCRIPTION: FORMED
BOWEL INCONTINENCE: 1

## 2023-03-20 ENCOUNTER — HOME CARE VISIT (OUTPATIENT)
Dept: SCHEDULING | Facility: HOME HEALTH | Age: 73
End: 2023-03-20
Payer: MEDICARE

## 2023-03-20 PROCEDURE — G0299 HHS/HOSPICE OF RN EA 15 MIN: HCPCS

## 2023-03-20 PROCEDURE — G0156 HHCP-SVS OF AIDE,EA 15 MIN: HCPCS

## 2023-03-21 VITALS — HEART RATE: 98 BPM | DIASTOLIC BLOOD PRESSURE: 62 MMHG | SYSTOLIC BLOOD PRESSURE: 105 MMHG | RESPIRATION RATE: 16 BRPM

## 2023-03-21 ASSESSMENT — ENCOUNTER SYMPTOMS
BOWEL INCONTINENCE: 1
STOOL DESCRIPTION: FORMED

## 2023-03-23 ENCOUNTER — HOME CARE VISIT (OUTPATIENT)
Dept: SCHEDULING | Facility: HOME HEALTH | Age: 73
End: 2023-03-23
Payer: MEDICARE

## 2023-03-23 PROCEDURE — G0156 HHCP-SVS OF AIDE,EA 15 MIN: HCPCS

## 2023-03-26 ENCOUNTER — APPOINTMENT (OUTPATIENT)
Dept: GENERAL RADIOLOGY | Age: 73
End: 2023-03-26
Payer: MEDICARE

## 2023-03-26 ENCOUNTER — HOME CARE VISIT (OUTPATIENT)
Dept: SCHEDULING | Facility: HOME HEALTH | Age: 73
End: 2023-03-26
Payer: MEDICARE

## 2023-03-26 ENCOUNTER — HOSPITAL ENCOUNTER (EMERGENCY)
Age: 73
Discharge: HOME | End: 2023-03-26
Attending: EMERGENCY MEDICINE
Payer: MEDICARE

## 2023-03-26 VITALS
SYSTOLIC BLOOD PRESSURE: 113 MMHG | OXYGEN SATURATION: 97 % | DIASTOLIC BLOOD PRESSURE: 69 MMHG | WEIGHT: 135 LBS | HEIGHT: 63 IN | BODY MASS INDEX: 23.92 KG/M2 | TEMPERATURE: 97.7 F | RESPIRATION RATE: 16 BRPM | HEART RATE: 80 BPM

## 2023-03-26 DIAGNOSIS — T81.30XA ABDOMINAL WOUND DEHISCENCE, INITIAL ENCOUNTER: Primary | ICD-10-CM

## 2023-03-26 PROCEDURE — 99283 EMERGENCY DEPT VISIT LOW MDM: CPT | Performed by: EMERGENCY MEDICINE

## 2023-03-26 PROCEDURE — 74018 RADEX ABDOMEN 1 VIEW: CPT

## 2023-03-26 ASSESSMENT — ENCOUNTER SYMPTOMS
STOOL DESCRIPTION: FORMED
BOWEL INCONTINENCE: 1
HEMOPTYSIS: 0

## 2023-03-26 ASSESSMENT — LIFESTYLE VARIABLES
HOW OFTEN DO YOU HAVE A DRINK CONTAINING ALCOHOL: NEVER
HOW MANY STANDARD DRINKS CONTAINING ALCOHOL DO YOU HAVE ON A TYPICAL DAY: PATIENT DOES NOT DRINK

## 2023-03-26 NOTE — ED NOTES
TRANSFER - OUT REPORT:    Verbal report given to [address] on [facility]  being transferred to [address] [place] [address]  for routine progression of patient care       Report consisted of patient's Situation, Background, Assessment and   Recommendations(SBAR). Information from the following report(s) ED SBAR was reviewed with the receiving nurse. Lines:       Opportunity for questions and clarification was provided.       Patient transported with:  Noé Vela RN  03/26/23 5345

## 2023-03-26 NOTE — ED PROVIDER NOTES
Emergency Department Provider Note                   PCP:                Lm Dominguez MD               Age: 74 y.o. Sex: female     DISPOSITION Decision To Discharge 03/26/2023 02:27:44 PM       ICD-10-CM    1. Abdominal wound dehiscence, initial encounter  T81.30XA           MEDICAL DECISION MAKING  Complexity of Problems Addressed:  Chronic illness with acute change    Data Reviewed and Analyzed:  Category 1:   I reviewed records from an external source: provider visit notes from outside specialist.  I ordered each unique test.  I interpreted the results of each unique test.    The patients assessment required an independent historian: EMS due to baseline altered mental status    Category 2:   I independently ordered and interpreted the X-rays. Catheter still in the abdominal cavity    Category 3: Discussion of management or test interpretation. Has had Pleurx catheter for almost 1 year. She is confused at baseline with minimal responsiveness. Under hospice care. Tube appears to have partially migrated through abdominal wall and a loop. Discussed with interventional radiology, Dr. Nakita Mckeon. He recommended placing a dressing and will assure follow-up in the office for likely removal and possible replacement. Risk of Complications and/or Morbidity of Patient Management:  Discussion with external consultants     Irven Moritz is a 74 y.o. female who presents to the Emergency Department with chief complaint of    Chief Complaint   Patient presents with    Other     Tube removed      75-year-old female presents from [facility] postacute for accidental removal of Pleurx abdominal catheter. She is on hospice for end-stage liver disease. Catheter was placed in May of last year. Hospice MD advised transfer to the emergency department. The history is limited by the condition of the patient.       Review of Systems   Unable to perform ROS: Patient nonverbal     Vitals signs and nursing note

## 2023-03-26 NOTE — DISCHARGE SUMMARY
I have reviewed discharge instructions with the facility RN. The facility RN verbalized understanding. Patient left ED via Discharge Method: stretcher to [facility] [address] [facility] with Rambo Pratt. Opportunity for questions and clarification provided. Patient given 0 scripts. To continue your aftercare when you leave the hospital, you may receive an automated call from our care team to check in on how you are doing. This is a free service and part of our promise to provide the best care and service to meet your aftercare needs.  If you have questions, or wish to unsubscribe from this service please call [phone]. Thank you for Choosing our [employer] Emergency Department.

## 2023-03-26 NOTE — ED NOTES
Oliva Cardona RN from [facility] called this RN for an update. Per Oliva Cardona patient's tube has not been used \"in a while\". Provider notified.      Marito Plasencia RN  03/26/23 3900

## 2023-03-26 NOTE — ED NOTES
[address] [place] [address] called to transport patient back to facility; ETA 1530.      Torey Farias  03/26/23 1431

## 2023-03-26 NOTE — DISCHARGE INSTRUCTIONS
Keep dressing over tubing. Follow-up with interventional radiology for tube removal and possible replacement. I have discussed with Dr. Lissett Liu, interventional radiology today. He will pass message on to [facility] radiology to assure follow-up. Please call the numbers above if you are not contacted. Return for any worsening or concerning symptoms.

## 2023-03-26 NOTE — ED NOTES
Upon assessment, occlusive dressing in place. tubing still in patient through 2x openings. Scant bleeding noted. No erythema or purulent discharge noted from site. Patient did not c/o pain or tenderness.      Paul Mix RN  03/26/23 7577

## 2023-03-26 NOTE — ED TRIAGE NOTES
Patient arrives via EMS from [facility] [address] because of accidental liver tube removal.  Patient is on hospice for end stage liver disease and has had a PleurX catheter. Per facility staff tube was displaced then removed today. Hospice MD recommended patient be seen at ER for this and ER provider can decide on tube being replaced. ENS vitals: 96.4 axillary, HR 82, /64, , RR 16, 98% on RA  Confused at baseline with limited verbal responses. NAD.

## 2023-03-27 ENCOUNTER — HOME CARE VISIT (OUTPATIENT)
Dept: SCHEDULING | Facility: HOME HEALTH | Age: 73
End: 2023-03-27
Payer: MEDICARE

## 2023-03-27 PROCEDURE — G0156 HHCP-SVS OF AIDE,EA 15 MIN: HCPCS

## 2023-03-27 NOTE — HOSPICE
a 64yo female with a hospice diagnosis of Liver Failure         Again, on arrival, pt is sitting in her hospital bed at a forty-five degree angle and slightly leaning toward her right side with eyes closed. Pt responds sluggishly her name being called. Although never fully awake, pt is fully oriented to self. Per facility staff, pt is a feeder and continues to eat but very little, pt does not eat enough to meet body requirements (40% or less). No reported nausea, vomiting or swallowing concerns. Pt is displaying notable weight loss as evidence by a slender face and a thinner/smaller body frame is noted. Pt is incontinent of Bowel & Bladder (B&B). Pt continues to have regular BMs. Noted ascites, abdomen girth =39        Focused skin breakdown prevention/reduction strategies are as follows: applying skin protectant after incontinent episodes, off-loading the wound, regular repositioning (side to side every 1-2 hours), nutrition and an air mattress is in place. Wound nurse reports: Stage three pressure wound to the sacrum (0.8 x 1.1 x 0.2 cm). Hand hygiene. Remove old dressing. Cleanse with NS or WC. Gently pat dry. Applying exoderm (hydrocolloid sheet). Pt tolerated the procedure without discomfort. Wound Care order: The facility wound care nurse or facility floor nurse to perform dressing changes. Hand hygiene. Remove old dressing. Cleanse with NS or WC. Gently pat dry. Applying exoderm (hydrocolloid sheet) three times weekly (Tues, Thur & Sat) and PRN for dislodgement. Measure weekly. Coordination/Collaboration of care with facility staff who is advised to contact hospice [phone] regarding any pt concerns, falls and/or any change in patient's condition. Facility staff voices an understanding regarding call hospice first, not 911.

## 2023-03-28 ENCOUNTER — HOSPITAL ENCOUNTER (OUTPATIENT)
Dept: INTERVENTIONAL RADIOLOGY/VASCULAR | Age: 73
Discharge: HOME | End: 2023-03-31
Payer: MEDICARE

## 2023-03-28 ENCOUNTER — HOME CARE VISIT (OUTPATIENT)
Dept: SCHEDULING | Facility: HOME HEALTH | Age: 73
End: 2023-03-28
Payer: MEDICARE

## 2023-03-28 DIAGNOSIS — R18.8 OTHER ASCITES: ICD-10-CM

## 2023-03-28 PROCEDURE — G0299 HHS/HOSPICE OF RN EA 15 MIN: HCPCS

## 2023-03-28 PROCEDURE — 49422 REMOVE TUNNELED IP CATH: CPT

## 2023-03-29 VITALS — DIASTOLIC BLOOD PRESSURE: 65 MMHG | SYSTOLIC BLOOD PRESSURE: 110 MMHG | HEART RATE: 81 BPM | RESPIRATION RATE: 16 BRPM

## 2023-03-29 ASSESSMENT — ENCOUNTER SYMPTOMS
STOOL DESCRIPTION: FORMED
BOWEL INCONTINENCE: 1

## 2023-03-30 ENCOUNTER — HOME CARE VISIT (OUTPATIENT)
Dept: SCHEDULING | Facility: HOME HEALTH | Age: 73
End: 2023-03-30
Payer: MEDICARE

## 2023-03-30 PROCEDURE — G0156 HHCP-SVS OF AIDE,EA 15 MIN: HCPCS

## 2023-04-03 ENCOUNTER — HOME CARE VISIT (OUTPATIENT)
Dept: SCHEDULING | Facility: HOME HEALTH | Age: 73
End: 2023-04-03
Payer: MEDICARE

## 2023-04-03 PROCEDURE — G0299 HHS/HOSPICE OF RN EA 15 MIN: HCPCS

## 2023-04-03 PROCEDURE — G0156 HHCP-SVS OF AIDE,EA 15 MIN: HCPCS

## 2023-04-03 NOTE — HOSPICE
PT tolerated bed bath and other adl's today. Ms Jose Oconnor yelled out when I moved her left hand, Lucia Ritchie RN CM arrived as I was finishing upi and I reported it to her. She did look at the hand and talked to the facility nurse about it. Luis Nunn also spoke with TAYLOR Mosher about better pain management.

## 2023-04-05 ENCOUNTER — HOME CARE VISIT (OUTPATIENT)
Dept: HOSPICE | Facility: HOSPICE | Age: 73
End: 2023-04-05
Payer: MEDICARE

## 2023-04-05 VITALS — SYSTOLIC BLOOD PRESSURE: 114 MMHG | DIASTOLIC BLOOD PRESSURE: 68 MMHG | HEART RATE: 82 BPM | RESPIRATION RATE: 16 BRPM

## 2023-04-05 PROCEDURE — G0155 HHCP-SVS OF CSW,EA 15 MIN: HCPCS

## 2023-04-06 ENCOUNTER — HOME CARE VISIT (OUTPATIENT)
Dept: SCHEDULING | Facility: HOME HEALTH | Age: 73
End: 2023-04-06
Payer: MEDICARE

## 2023-04-06 PROCEDURE — G0156 HHCP-SVS OF AIDE,EA 15 MIN: HCPCS

## 2023-04-17 ENCOUNTER — HOME CARE VISIT (OUTPATIENT)
Dept: SCHEDULING | Facility: HOME HEALTH | Age: 73
End: 2023-04-17
Payer: MEDICARE

## 2023-04-17 ENCOUNTER — HOME CARE VISIT (OUTPATIENT)
Dept: HOSPICE | Facility: HOSPICE | Age: 73
End: 2023-04-17
Payer: MEDICARE

## 2023-04-17 PROCEDURE — G0155 HHCP-SVS OF CSW,EA 15 MIN: HCPCS

## 2023-04-17 PROCEDURE — G0156 HHCP-SVS OF AIDE,EA 15 MIN: HCPCS

## 2023-04-19 ENCOUNTER — HOME CARE VISIT (OUTPATIENT)
Dept: SCHEDULING | Facility: HOME HEALTH | Age: 73
End: 2023-04-19
Payer: MEDICARE

## 2023-04-19 PROCEDURE — G0299 HHS/HOSPICE OF RN EA 15 MIN: HCPCS

## 2023-04-20 ENCOUNTER — HOME CARE VISIT (OUTPATIENT)
Dept: SCHEDULING | Facility: HOME HEALTH | Age: 73
End: 2023-04-20
Payer: MEDICARE

## 2023-04-20 PROCEDURE — G0156 HHCP-SVS OF AIDE,EA 15 MIN: HCPCS

## 2023-04-20 ASSESSMENT — ENCOUNTER SYMPTOMS: HEMOPTYSIS: 0

## 2023-04-20 NOTE — HOSPICE
Christopher Escobedo is lying in her hospital bed looking at her TV. She is interactive today. She is bed bound and dependent for all ADLs. She had a soiled, wet brief on and IVONNE changed her brief and gown. IVONNE called and reported to her RNCM her bottom redness and discomfort and notified the facility cna. Christopher Escobedo did answer IVONNE yes and no questions and offered  smiles. IVONNE provided some water for patient as well. Her sister continues to visit weekly and bring Christopher Escobedo dog. IVONNE provided emotional support  and socialization.

## 2023-04-23 ASSESSMENT — ENCOUNTER SYMPTOMS
BOWEL INCONTINENCE: 1
STOOL DESCRIPTION: FORMED

## 2023-04-24 ENCOUNTER — HOME CARE VISIT (OUTPATIENT)
Dept: SCHEDULING | Facility: HOME HEALTH | Age: 73
End: 2023-04-24
Payer: MEDICARE

## 2023-04-24 PROCEDURE — G0156 HHCP-SVS OF AIDE,EA 15 MIN: HCPCS

## 2023-04-26 ENCOUNTER — HOME CARE VISIT (OUTPATIENT)
Dept: SCHEDULING | Facility: HOME HEALTH | Age: 73
End: 2023-04-26
Payer: MEDICARE

## 2023-04-26 VITALS — HEART RATE: 82 BPM | RESPIRATION RATE: 16 BRPM | SYSTOLIC BLOOD PRESSURE: 116 MMHG | DIASTOLIC BLOOD PRESSURE: 71 MMHG

## 2023-04-26 PROCEDURE — G0299 HHS/HOSPICE OF RN EA 15 MIN: HCPCS

## 2023-04-26 ASSESSMENT — ENCOUNTER SYMPTOMS
BOWEL INCONTINENCE: 1
STOOL DESCRIPTION: FORMED

## 2023-04-26 NOTE — HOSPICE
a 64yo female with a hospice diagnosis of Liver Failure             No significant change since last visit, pt continues to decline. On arrival, pt is lying in her hospital bed, in supine position at a 35 degree angle, head turned to her right side and eyes closed. Pt responds sluggishly to verbal & tactile stimuli. Pt was never fully awake, but fully oriented to self as evidence by looking in this nurse's direction when her name is called. Pt is a feeder and continues to eat but very little. Her nutritional intake is not enough to meet body requirements (40% or less during meals). No reported nausea, vomiting or swallowing concerns. Pt is displaying notable weight loss as evidence by a slender face and a thinner/smaller body frame. Pt is incontinent of Bowel & Bladder (B&B). Pt continues to have regular BMs. Noted ascites, abdomen girth =39, abdomen distended but non-tender. Wound Care order: Stage three pressure wound to the sacrum/coccyx 1.8cm x 0.9 x 0  The facility wound care nurse, facility floor nurse or the hospice SN to perform dressing changes. Hand hygiene. Remove old dressing. Cleanse with NS or WC. Gently pat dry. Applying calcium alginate and cover with a foam dressing with border every three to four days and PRN for dislodgement. Measure weekly. Focused Care:   *Perform dressing changes as ordered. Assess for s/sx of infections (pain, fever, foul odor or warmth redness/swelling to site)   *Skin breakdown prevention/reduction strategies are as follows: applying skin protectant after incontinent episodes, off-load wound by repositioning per facility protocol (turn side to side in bed every 1-2 hours if able), nutrition and an air mattress is in place. *No signs/symptoms of infection noted. Pt's sister Solnage Magallanes) contacted to discuss pt's status.        Coordination/Collaboration of pt's care is implemented with facility staff who are advised to contact [facility]

## 2023-04-27 ENCOUNTER — HOME CARE VISIT (OUTPATIENT)
Dept: SCHEDULING | Facility: HOME HEALTH | Age: 73
End: 2023-04-27
Payer: MEDICARE

## 2023-04-27 PROCEDURE — G0156 HHCP-SVS OF AIDE,EA 15 MIN: HCPCS

## 2023-04-28 VITALS — RESPIRATION RATE: 16 BRPM | DIASTOLIC BLOOD PRESSURE: 68 MMHG | SYSTOLIC BLOOD PRESSURE: 110 MMHG | HEART RATE: 74 BPM

## 2023-05-01 ENCOUNTER — HOME CARE VISIT (OUTPATIENT)
Dept: SCHEDULING | Facility: HOME HEALTH | Age: 73
End: 2023-05-01
Payer: MEDICARE

## 2023-05-01 PROCEDURE — G0156 HHCP-SVS OF AIDE,EA 15 MIN: HCPCS

## 2023-05-02 ENCOUNTER — HOME CARE VISIT (OUTPATIENT)
Dept: SCHEDULING | Facility: HOME HEALTH | Age: 73
End: 2023-05-02
Payer: MEDICARE

## 2023-05-02 NOTE — HOSPICE
S: pt Alexa lying in hospital bed in facility; tv softly playing on Western movie  B: routine visit/assessment  A: pt slightly acknowledged 's greeting, softly affirming that she is receiving good care at facility; eyes closed entire visit; no further responses; no goodbye  R: assurance of care/availability; active listening; scripture from [employer];  \"It Is Well;\" prayer  Visit: 1 x / mo.; 2 prn

## 2023-05-04 ENCOUNTER — HOME CARE VISIT (OUTPATIENT)
Dept: SCHEDULING | Facility: HOME HEALTH | Age: 73
End: 2023-05-04
Payer: MEDICARE

## 2023-05-04 PROCEDURE — G0156 HHCP-SVS OF AIDE,EA 15 MIN: HCPCS

## 2023-05-07 ENCOUNTER — HOME CARE VISIT (OUTPATIENT)
Dept: SCHEDULING | Facility: HOME HEALTH | Age: 73
End: 2023-05-07
Payer: MEDICARE

## 2023-05-07 VITALS
SYSTOLIC BLOOD PRESSURE: 90 MMHG | RESPIRATION RATE: 18 BRPM | DIASTOLIC BLOOD PRESSURE: 60 MMHG | HEART RATE: 66 BPM | TEMPERATURE: 98 F

## 2023-05-07 PROCEDURE — G0299 HHS/HOSPICE OF RN EA 15 MIN: HCPCS

## 2023-05-07 ASSESSMENT — ENCOUNTER SYMPTOMS: HEMOPTYSIS: 0

## 2023-05-08 ENCOUNTER — HOME CARE VISIT (OUTPATIENT)
Dept: SCHEDULING | Facility: HOME HEALTH | Age: 73
End: 2023-05-08
Payer: MEDICARE

## 2023-05-08 ENCOUNTER — HOME CARE VISIT (OUTPATIENT)
Dept: HOSPICE | Facility: HOSPICE | Age: 73
End: 2023-05-08
Payer: MEDICARE

## 2023-05-08 PROCEDURE — G0299 HHS/HOSPICE OF RN EA 15 MIN: HCPCS

## 2023-05-08 PROCEDURE — G0156 HHCP-SVS OF AIDE,EA 15 MIN: HCPCS

## 2023-05-08 PROCEDURE — G0155 HHCP-SVS OF CSW,EA 15 MIN: HCPCS

## 2023-05-08 ASSESSMENT — ENCOUNTER SYMPTOMS
STOOL DESCRIPTION: FORMED
BOWEL INCONTINENCE: 1

## 2023-05-09 ASSESSMENT — ENCOUNTER SYMPTOMS: HEMOPTYSIS: 0

## 2023-05-09 NOTE — HOSPICE
Mo Kuo is lying in her hospital bed with her eyes closed. She answers a few questions but not all of them. She does not show signs of discomfort presently. SW sat with her and talked some for a visit. Her face is more thin. She was not as interactive as usual. The facility CNA states that her intake has declined recently. SW ofered emotional support and socialization. Her CMRN came is at the end of the visit. The sister still visits weekly. Mo Kuo is showing more decline but no distress currently.

## 2023-05-11 ENCOUNTER — HOME CARE VISIT (OUTPATIENT)
Dept: SCHEDULING | Facility: HOME HEALTH | Age: 73
End: 2023-05-11
Payer: MEDICARE

## 2023-05-11 PROCEDURE — G0156 HHCP-SVS OF AIDE,EA 15 MIN: HCPCS

## 2023-05-12 VITALS — SYSTOLIC BLOOD PRESSURE: 99 MMHG | RESPIRATION RATE: 16 BRPM | HEART RATE: 74 BPM | DIASTOLIC BLOOD PRESSURE: 63 MMHG

## 2023-05-12 ASSESSMENT — ENCOUNTER SYMPTOMS
BOWEL INCONTINENCE: 1
STOOL DESCRIPTION: FORMED

## 2023-05-12 NOTE — HOSPICE
a 66yo female with a hospice diagnosis of Liver Failure         Pt is in her usual place, her hospital bed in supine position and eyes closed. Pt responds to verbal stimuli briefly via intermittent eye contact. Semi-alert, seems to be oriented to self, attempts to communicate with noted slurred delayed speech and difficulty follows commands. Pt is experiencing matting eyes which are cleansed with a warmed clear water towel. Pt's appetite/nutritional intake is poor. Per facility staff, pt remains a feeder, she eats but very little and sometimes will not eat at all. Pt does not eat enough to meet body requirements as evidence by eating two or three bites before refusing to open her mouth. No reported nausea, vomiting or swallowing concerns. Pt will sometimes develop the hiccups after fluid intake. Pt is displaying notable weight loss as evidence by a slender face and a thinner/smaller body frame is noted. Pt abdomen looks smaller this visit, abdomen girth was 39 and this visit it is 36. Pt is bed bound and incontinent of Bowel & Bladder (B&B). Pt continues to have wet briefs and has regular BMs. Pt is unable to express her thoughts and/or feelings regarding End of Life (EOL). Skin Breakdown. Focused skin breakdown prevention/reduction strategies are as follows: perform wound care as ordered, applying skin protectant after incontinent episodes, off-loading the wound, regular repositioning (side to side every 1-2 hours), nutrition and an air mattress is in place. Hand hygiene. Removed old dressing. Cleansed with WC. Gently patted dry. Applied calcium alginate and cover with a foam dressing. 1.5cm x1cm x 0.2cm, attached edges, moderate serous drainage. Wound care orders for Stage three pressure wound to the sacrum/coccyx: The facility wound care nurse, facility floor nurse or the hospice SN to perform dressing changes. Hand hygiene. Remove old dressing. Cleanse with NS or WC.

## 2023-05-15 ENCOUNTER — HOME CARE VISIT (OUTPATIENT)
Dept: SCHEDULING | Facility: HOME HEALTH | Age: 73
End: 2023-05-15
Payer: MEDICARE

## 2023-05-15 PROCEDURE — G0299 HHS/HOSPICE OF RN EA 15 MIN: HCPCS

## 2023-05-15 PROCEDURE — G0156 HHCP-SVS OF AIDE,EA 15 MIN: HCPCS

## 2023-05-17 VITALS — HEART RATE: 74 BPM | RESPIRATION RATE: 16 BRPM | SYSTOLIC BLOOD PRESSURE: 99 MMHG | DIASTOLIC BLOOD PRESSURE: 58 MMHG

## 2023-05-17 ASSESSMENT — ENCOUNTER SYMPTOMS
STOOL DESCRIPTION: FORMED
BOWEL INCONTINENCE: 1

## 2023-05-17 NOTE — HOSPICE
a 64yo female with a hospice diagnosis of Liver Failure              Pt is in her usual place, her hospital bed in supine position and eyes closed. Pt responds to verbal stimuli briefly via intermittent eye contact. Pt is semi-alert and seems to be oriented to self, but never fully woke up. Pt is experiencing matting eyes which are cleansed with a warmed clear water towel. Pt never woke up this visit. No real engagement. Pt's appetite is poor and nutritional intake is poor. Per facility staff, pt remains a feeder, she eats but very little and sometimes will not eat at all. Pt does not eat enough to meet body requirements as evidence by eating two or three bites before refusing to open her mouth. No reported nausea, vomiting or swallowing concerns. Pt will sometimes develop the hiccups after fluid intake. Pt is displaying notable weight loss and again abdomen looks smaller this visit, abdomen girth is 36. Pt is bed bound and incontinent of Bowel & Bladder (B&B). Pt continues to have wet briefs and has regular BMs. Pt is unable to express her thoughts and/or feelings regarding End of Life (EOL). Skin Breakdown. Focused skin breakdown prevention/reduction strategies are as follows: perform wound care as ordered, applying skin protectant after incontinent episodes, off-loading the wound, regular repositioning (side to side every 1-2 hours), nutrition and an air mattress is in place. Hand hygiene. Removed old dressing. Cleansed with WC. Gently patted dry. Applied calcium alginate and cover with a foam dressing. 1.6cm x1cm x 0cm, attached edges, moderate serous drainage. Wound care orders for Stage three pressure wound to the sacrum/coccyx: The facility wound care nurse, facility floor nurse or the hospice SN to perform dressing changes. Hand hygiene. Remove old dressing. Cleanse with NS or WC. Gently pat dry.  Applying calcium alginate and cover with a foam dressing

## 2023-05-18 ENCOUNTER — HOME CARE VISIT (OUTPATIENT)
Dept: SCHEDULING | Facility: HOME HEALTH | Age: 73
End: 2023-05-18
Payer: MEDICARE

## 2023-05-18 PROCEDURE — G0156 HHCP-SVS OF AIDE,EA 15 MIN: HCPCS

## 2023-05-22 ENCOUNTER — HOME CARE VISIT (OUTPATIENT)
Dept: SCHEDULING | Facility: HOME HEALTH | Age: 73
End: 2023-05-22
Payer: MEDICARE

## 2023-05-22 PROCEDURE — G0156 HHCP-SVS OF AIDE,EA 15 MIN: HCPCS

## 2023-05-23 ENCOUNTER — HOME CARE VISIT (OUTPATIENT)
Dept: SCHEDULING | Facility: HOME HEALTH | Age: 73
End: 2023-05-23
Payer: MEDICARE

## 2023-05-23 PROCEDURE — G0299 HHS/HOSPICE OF RN EA 15 MIN: HCPCS

## 2023-05-25 ENCOUNTER — HOME CARE VISIT (OUTPATIENT)
Dept: SCHEDULING | Facility: HOME HEALTH | Age: 73
End: 2023-05-25
Payer: MEDICARE

## 2023-05-25 PROCEDURE — G0156 HHCP-SVS OF AIDE,EA 15 MIN: HCPCS

## 2023-05-26 ENCOUNTER — HOME CARE VISIT (OUTPATIENT)
Dept: HOSPICE | Facility: HOSPICE | Age: 73
End: 2023-05-26
Payer: MEDICARE

## 2023-05-26 PROCEDURE — G0155 HHCP-SVS OF CSW,EA 15 MIN: HCPCS

## 2023-05-26 ASSESSMENT — ENCOUNTER SYMPTOMS: HEMOPTYSIS: 0

## 2023-05-26 NOTE — HOSPICE
Upon arrival, Cy Olvera is sleeping and slept through most of the visit. She did wken twice and and a question but was quickly back off to sleep. She is susually more interctive. SW sat with her and talked to her. SW provided emotional support and socialization. Her sister visits weekly.  No avila in needs presently

## 2023-05-30 ENCOUNTER — HOME CARE VISIT (OUTPATIENT)
Dept: SCHEDULING | Facility: HOME HEALTH | Age: 73
End: 2023-05-30
Payer: MEDICARE

## 2023-05-30 VITALS — RESPIRATION RATE: 16 BRPM | SYSTOLIC BLOOD PRESSURE: 91 MMHG | DIASTOLIC BLOOD PRESSURE: 52 MMHG | HEART RATE: 72 BPM

## 2023-05-30 PROCEDURE — G0299 HHS/HOSPICE OF RN EA 15 MIN: HCPCS

## 2023-05-30 ASSESSMENT — ENCOUNTER SYMPTOMS
STOOL DESCRIPTION: FORMED
BOWEL INCONTINENCE: 1

## 2023-05-30 NOTE — HOSPICE
*Coordination/Collaboration of pt's care is implemented with facility staff who are advised to contact [id number] [facility]) [phone] regarding any pt concerns, fall(s) and/or any change in pt's condition. Facility staff voices an understanding regarding call hospice first, not 911.

## 2023-06-01 ENCOUNTER — HOME CARE VISIT (OUTPATIENT)
Dept: SCHEDULING | Facility: HOME HEALTH | Age: 73
End: 2023-06-01
Payer: MEDICARE

## 2023-06-01 PROCEDURE — G0156 HHCP-SVS OF AIDE,EA 15 MIN: HCPCS

## 2023-06-03 VITALS — SYSTOLIC BLOOD PRESSURE: 100 MMHG | RESPIRATION RATE: 16 BRPM | HEART RATE: 84 BPM | DIASTOLIC BLOOD PRESSURE: 58 MMHG

## 2023-06-03 ASSESSMENT — ENCOUNTER SYMPTOMS
STOOL DESCRIPTION: FORMED
BOWEL INCONTINENCE: 1

## 2023-06-03 NOTE — HOSPICE
reported. Coordination/Collaboration of pt's care is implemented with facility staff who are advised to contact [address] [facility]) [phone] regarding any pt concerns, fall(s) and/or any change in pt's condition. Facility staff voices an understanding regarding call hospice first, not 911.

## 2023-06-05 ENCOUNTER — HOME CARE VISIT (OUTPATIENT)
Dept: SCHEDULING | Facility: HOME HEALTH | Age: 73
End: 2023-06-05
Payer: MEDICARE

## 2023-06-05 PROCEDURE — G0299 HHS/HOSPICE OF RN EA 15 MIN: HCPCS

## 2023-06-05 PROCEDURE — G0156 HHCP-SVS OF AIDE,EA 15 MIN: HCPCS

## 2023-06-05 NOTE — HOSPICE
Ms Yanira Hu had a large bm today and she had blood in her brief as well. Grecia Cardenas and the two nurses at the facility are aware of this. She tolerated all her care well today.

## 2023-06-08 ENCOUNTER — HOME CARE VISIT (OUTPATIENT)
Dept: SCHEDULING | Facility: HOME HEALTH | Age: 73
End: 2023-06-08
Payer: MEDICARE

## 2023-06-08 PROCEDURE — G0156 HHCP-SVS OF AIDE,EA 15 MIN: HCPCS

## 2023-06-12 ASSESSMENT — ENCOUNTER SYMPTOMS
STOOL DESCRIPTION: FORMED
BOWEL INCONTINENCE: 1

## 2023-06-19 NOTE — HOSPICE
Sergei Andrade did not awaken during this visit. She has visually declined AEB her thinning face and lack of responsiveness. SW sat with Sergei Andrade and talked to her, offering support. She is declining to end stage it appears. She did not show any signs of discomfort. No one was in the room.   SW to follow for needs

## 2023-06-19 NOTE — HOSPICE
[facility] LPN called and stated that pt was not breathing. sn arrived. pt was absent of vital signs. pt was verified that pt had  at 0730 am. Hever Guajardo was notified of pts death. pts sister was present and coping well. sn called [facility] in [place] to  pts body for [address] [facility] in [place]. sn called [facility] to have equipment picked up. The facility cna bathed pt. Family refused the need of a  or  today. all meds were disposed of by the Facility. Email sent to [facility] staff about pts death. [facility]  was faxed notification of pts death. Instructed family to call [facility] with any questions.

## (undated) DEVICE — GLOVE SURG SZ 8 CRM LTX FREE POLYISOPRENE POLYMER BEAD ANTI

## (undated) DEVICE — NEEDLE HYPO 21GA L1.5IN GRN POLYPR HUB S STL REG BVL STR

## (undated) DEVICE — 3-0 COATED VICRYL PLUS UNDYED 1X27" SH --

## (undated) DEVICE — SUTURE PDS II SZ 0 L27IN ABSRB VLT L36MM CT-1 1/2 CIR Z340H

## (undated) DEVICE — GARMENT,MEDLINE,DVT,INT,CALF,MED, GEN2: Brand: MEDLINE

## (undated) DEVICE — STRIP,CLOSURE,WOUND,MEDI-STRIP,1/2X4: Brand: MEDLINE

## (undated) DEVICE — SOLUTION IRRIG 1000ML 09% SOD CHL USP PIC PLAS CONTAINER

## (undated) DEVICE — SHEET, T, LAPAROTOMY, STERILE: Brand: MEDLINE

## (undated) DEVICE — Device

## (undated) DEVICE — SUT VCRL + 4-0 18IN PS2 UD --

## (undated) DEVICE — SUT PROL 2-0 30IN CT2 BLU --

## (undated) DEVICE — PAD,NON-ADHERENT,3X8,STERILE,LF,1/PK: Brand: MEDLINE

## (undated) DEVICE — ADHESIVE LIQ H2O INSOLUBLE 3 CC LO RISK N STN MASTISOL

## (undated) DEVICE — 3M™ TEGADERM™ TRANSPARENT FILM DRESSING FRAME STYLE, 1626W, 4 IN X 4-3/4 IN (10 CM X 12 CM), 50/CT 4CT/CASE: Brand: 3M™ TEGADERM™

## (undated) DEVICE — MINOR SPLIT GENERAL: Brand: MEDLINE INDUSTRIES, INC.